# Patient Record
Sex: FEMALE | Race: WHITE | NOT HISPANIC OR LATINO | Employment: OTHER | URBAN - METROPOLITAN AREA
[De-identification: names, ages, dates, MRNs, and addresses within clinical notes are randomized per-mention and may not be internally consistent; named-entity substitution may affect disease eponyms.]

---

## 2017-01-30 PROBLEM — G45.9 TIA (TRANSIENT ISCHEMIC ATTACK): Status: ACTIVE | Noted: 2017-01-30

## 2017-01-30 PROBLEM — Z23 NEED FOR STREPTOCOCCUS PNEUMONIAE VACCINATION: Status: ACTIVE | Noted: 2017-01-30

## 2018-04-02 PROBLEM — Z57.1 OCCUPATIONAL EXPOSURE TO RADIATION: Chronic | Status: ACTIVE | Noted: 2018-04-02

## 2018-04-02 PROBLEM — G45.9 TIA (TRANSIENT ISCHEMIC ATTACK): Chronic | Status: ACTIVE | Noted: 2017-01-30

## 2018-04-02 PROBLEM — Z57.1 OCCUPATIONAL EXPOSURE TO RADIATION: Status: ACTIVE | Noted: 2018-04-02

## 2018-04-02 PROBLEM — Z23 NEED FOR STREPTOCOCCUS PNEUMONIAE VACCINATION: Chronic | Status: ACTIVE | Noted: 2017-01-30

## 2018-11-09 PROBLEM — R40.0 DAYTIME SOMNOLENCE: Status: ACTIVE | Noted: 2018-11-09

## 2019-03-18 PROBLEM — Z91.81 RISK FOR FALLS: Status: ACTIVE | Noted: 2019-03-18

## 2019-08-08 ENCOUNTER — HOSPITAL ENCOUNTER (OUTPATIENT)
Dept: RADIOLOGY | Facility: MEDICAL CENTER | Age: 84
End: 2019-08-08

## 2019-08-08 ENCOUNTER — HOSPITAL ENCOUNTER (INPATIENT)
Facility: MEDICAL CENTER | Age: 84
LOS: 20 days | DRG: 023 | End: 2019-08-28
Attending: EMERGENCY MEDICINE | Admitting: INTERNAL MEDICINE
Payer: MEDICARE

## 2019-08-08 ENCOUNTER — APPOINTMENT (OUTPATIENT)
Dept: RADIOLOGY | Facility: MEDICAL CENTER | Age: 84
DRG: 023 | End: 2019-08-08
Attending: EMERGENCY MEDICINE
Payer: MEDICARE

## 2019-08-08 ENCOUNTER — HOSPITAL ENCOUNTER (OUTPATIENT)
Facility: MEDICAL CENTER | Age: 84
End: 2019-08-08
Payer: MEDICARE

## 2019-08-08 ENCOUNTER — APPOINTMENT (OUTPATIENT)
Dept: RADIOLOGY | Facility: MEDICAL CENTER | Age: 84
DRG: 023 | End: 2019-08-08
Attending: RADIOLOGY
Payer: MEDICARE

## 2019-08-08 DIAGNOSIS — I63.9 ACUTE CVA (CEREBROVASCULAR ACCIDENT) (HCC): ICD-10-CM

## 2019-08-08 DIAGNOSIS — I63.311 CEREBROVASCULAR ACCIDENT (CVA) DUE TO THROMBOSIS OF RIGHT MIDDLE CEREBRAL ARTERY (HCC): ICD-10-CM

## 2019-08-08 PROBLEM — D72.829 LEUKOCYTOSIS: Status: ACTIVE | Noted: 2019-08-08

## 2019-08-08 PROBLEM — Z66 DNR (DO NOT RESUSCITATE): Status: ACTIVE | Noted: 2019-08-08

## 2019-08-08 PROBLEM — E87.1 HYPONATREMIA: Status: ACTIVE | Noted: 2019-08-08

## 2019-08-08 LAB
ALBUMIN SERPL BCP-MCNC: 3.9 G/DL (ref 3.2–4.9)
ALBUMIN/GLOB SERPL: 1.1 G/DL
ALP SERPL-CCNC: 68 U/L (ref 30–99)
ALT SERPL-CCNC: 16 U/L (ref 2–50)
ANION GAP SERPL CALC-SCNC: 12 MMOL/L (ref 0–11.9)
AST SERPL-CCNC: 27 U/L (ref 12–45)
BASOPHILS # BLD AUTO: 0.1 % (ref 0–1.8)
BASOPHILS # BLD: 0.01 K/UL (ref 0–0.12)
BILIRUB SERPL-MCNC: 0.6 MG/DL (ref 0.1–1.5)
BUN SERPL-MCNC: 12 MG/DL (ref 8–22)
CALCIUM SERPL-MCNC: 8.8 MG/DL (ref 8.5–10.5)
CHLORIDE SERPL-SCNC: 99 MMOL/L (ref 96–112)
CO2 SERPL-SCNC: 22 MMOL/L (ref 20–33)
CREAT SERPL-MCNC: 0.83 MG/DL (ref 0.5–1.4)
EKG IMPRESSION: NORMAL
EOSINOPHIL # BLD AUTO: 0 K/UL (ref 0–0.51)
EOSINOPHIL NFR BLD: 0 % (ref 0–6.9)
ERYTHROCYTE [DISTWIDTH] IN BLOOD BY AUTOMATED COUNT: 42.2 FL (ref 35.9–50)
GLOBULIN SER CALC-MCNC: 3.7 G/DL (ref 1.9–3.5)
GLUCOSE SERPL-MCNC: 112 MG/DL (ref 65–99)
HCT VFR BLD AUTO: 41 % (ref 37–47)
HGB BLD-MCNC: 13.7 G/DL (ref 12–16)
IMM GRANULOCYTES # BLD AUTO: 0.05 K/UL (ref 0–0.11)
IMM GRANULOCYTES NFR BLD AUTO: 0.4 % (ref 0–0.9)
INR PPP: 1.11 (ref 0.87–1.13)
LYMPHOCYTES # BLD AUTO: 1.15 K/UL (ref 1–4.8)
LYMPHOCYTES NFR BLD: 10.1 % (ref 22–41)
MCH RBC QN AUTO: 31.4 PG (ref 27–33)
MCHC RBC AUTO-ENTMCNC: 33.4 G/DL (ref 33.6–35)
MCV RBC AUTO: 93.8 FL (ref 81.4–97.8)
MONOCYTES # BLD AUTO: 0.52 K/UL (ref 0–0.85)
MONOCYTES NFR BLD AUTO: 4.6 % (ref 0–13.4)
NEUTROPHILS # BLD AUTO: 9.69 K/UL (ref 2–7.15)
NEUTROPHILS NFR BLD: 84.8 % (ref 44–72)
NRBC # BLD AUTO: 0 K/UL
NRBC BLD-RTO: 0 /100 WBC
PLATELET # BLD AUTO: 182 K/UL (ref 164–446)
PMV BLD AUTO: 9.5 FL (ref 9–12.9)
POTASSIUM SERPL-SCNC: 4.4 MMOL/L (ref 3.6–5.5)
PROT SERPL-MCNC: 7.6 G/DL (ref 6–8.2)
PROTHROMBIN TIME: 14.6 SEC (ref 12–14.6)
RBC # BLD AUTO: 4.37 M/UL (ref 4.2–5.4)
SODIUM SERPL-SCNC: 133 MMOL/L (ref 135–145)
TROPONIN T SERPL-MCNC: 25 NG/L (ref 6–19)
WBC # BLD AUTO: 11.4 K/UL (ref 4.8–10.8)

## 2019-08-08 PROCEDURE — 80053 COMPREHEN METABOLIC PANEL: CPT | Mod: 91

## 2019-08-08 PROCEDURE — 93005 ELECTROCARDIOGRAM TRACING: CPT | Performed by: EMERGENCY MEDICINE

## 2019-08-08 PROCEDURE — 700111 HCHG RX REV CODE 636 W/ 250 OVERRIDE (IP)

## 2019-08-08 PROCEDURE — 99291 CRITICAL CARE FIRST HOUR: CPT

## 2019-08-08 PROCEDURE — 99291 CRITICAL CARE FIRST HOUR: CPT | Performed by: INTERNAL MEDICINE

## 2019-08-08 PROCEDURE — 70496 CT ANGIOGRAPHY HEAD: CPT

## 2019-08-08 PROCEDURE — 700105 HCHG RX REV CODE 258: Performed by: INTERNAL MEDICINE

## 2019-08-08 PROCEDURE — 71045 X-RAY EXAM CHEST 1 VIEW: CPT

## 2019-08-08 PROCEDURE — 93005 ELECTROCARDIOGRAM TRACING: CPT

## 2019-08-08 PROCEDURE — 700117 HCHG RX CONTRAST REV CODE 255: Performed by: EMERGENCY MEDICINE

## 2019-08-08 PROCEDURE — 85610 PROTHROMBIN TIME: CPT

## 2019-08-08 PROCEDURE — 99153 MOD SED SAME PHYS/QHP EA: CPT

## 2019-08-08 PROCEDURE — 36415 COLL VENOUS BLD VENIPUNCTURE: CPT

## 2019-08-08 PROCEDURE — 85025 COMPLETE CBC W/AUTO DIFF WBC: CPT | Mod: 91

## 2019-08-08 PROCEDURE — 84484 ASSAY OF TROPONIN QUANT: CPT | Mod: 91

## 2019-08-08 PROCEDURE — 700111 HCHG RX REV CODE 636 W/ 250 OVERRIDE (IP): Performed by: RADIOLOGY

## 2019-08-08 PROCEDURE — 0042T CT-CEREBRAL PERFUSION ANALYSIS: CPT

## 2019-08-08 PROCEDURE — B31R1ZZ FLUOROSCOPY OF INTRACRANIAL ARTERIES USING LOW OSMOLAR CONTRAST: ICD-10-PCS | Performed by: RADIOLOGY

## 2019-08-08 PROCEDURE — 03CG3Z7 EXTIRPATION OF MATTER FROM INTRACRANIAL ARTERY USING STENT RETRIEVER, PERCUTANEOUS APPROACH: ICD-10-PCS | Performed by: RADIOLOGY

## 2019-08-08 PROCEDURE — 770022 HCHG ROOM/CARE - ICU (200)

## 2019-08-08 PROCEDURE — 700117 HCHG RX CONTRAST REV CODE 255: Performed by: RADIOLOGY

## 2019-08-08 PROCEDURE — 99222 1ST HOSP IP/OBS MODERATE 55: CPT

## 2019-08-08 PROCEDURE — 70498 CT ANGIOGRAPHY NECK: CPT

## 2019-08-08 RX ORDER — SODIUM CHLORIDE 9 MG/ML
INJECTION, SOLUTION INTRAVENOUS CONTINUOUS
Status: DISCONTINUED | OUTPATIENT
Start: 2019-08-08 | End: 2019-08-14

## 2019-08-08 RX ORDER — AMOXICILLIN 250 MG
2 CAPSULE ORAL 2 TIMES DAILY
Status: DISCONTINUED | OUTPATIENT
Start: 2019-08-08 | End: 2019-08-09

## 2019-08-08 RX ORDER — ASPIRIN 300 MG/1
300 SUPPOSITORY RECTAL DAILY
Status: DISCONTINUED | OUTPATIENT
Start: 2019-08-09 | End: 2019-08-09

## 2019-08-08 RX ORDER — ONDANSETRON 2 MG/ML
4 INJECTION INTRAMUSCULAR; INTRAVENOUS EVERY 4 HOURS PRN
Status: DISCONTINUED | OUTPATIENT
Start: 2019-08-08 | End: 2019-08-28 | Stop reason: HOSPADM

## 2019-08-08 RX ORDER — CITALOPRAM 20 MG/1
20 TABLET ORAL DAILY
Status: ON HOLD | COMMUNITY
End: 2019-08-28

## 2019-08-08 RX ORDER — PANTOPRAZOLE SODIUM 40 MG/1
40 TABLET, DELAYED RELEASE ORAL
COMMUNITY

## 2019-08-08 RX ORDER — MIDAZOLAM HYDROCHLORIDE 1 MG/ML
.5-2 INJECTION INTRAMUSCULAR; INTRAVENOUS PRN
Status: ACTIVE | OUTPATIENT
Start: 2019-08-08 | End: 2019-08-08

## 2019-08-08 RX ORDER — LEVOTHYROXINE SODIUM 0.03 MG/1
25 TABLET ORAL
Status: DISCONTINUED | OUTPATIENT
Start: 2019-08-09 | End: 2019-08-09

## 2019-08-08 RX ORDER — POLYETHYLENE GLYCOL 3350 17 G/17G
1 POWDER, FOR SOLUTION ORAL
Status: DISCONTINUED | OUTPATIENT
Start: 2019-08-08 | End: 2019-08-09

## 2019-08-08 RX ORDER — ENALAPRILAT 1.25 MG/ML
1.25 INJECTION INTRAVENOUS EVERY 6 HOURS PRN
Status: DISCONTINUED | OUTPATIENT
Start: 2019-08-08 | End: 2019-08-28 | Stop reason: HOSPADM

## 2019-08-08 RX ORDER — ONDANSETRON 2 MG/ML
4 INJECTION INTRAMUSCULAR; INTRAVENOUS PRN
Status: ACTIVE | OUTPATIENT
Start: 2019-08-08 | End: 2019-08-08

## 2019-08-08 RX ORDER — SODIUM CHLORIDE 9 MG/ML
500 INJECTION, SOLUTION INTRAVENOUS
Status: ACTIVE | OUTPATIENT
Start: 2019-08-08 | End: 2019-08-08

## 2019-08-08 RX ORDER — ACETAMINOPHEN 325 MG/1
650 TABLET ORAL EVERY 6 HOURS PRN
Status: DISCONTINUED | OUTPATIENT
Start: 2019-08-08 | End: 2019-08-09

## 2019-08-08 RX ORDER — ASPIRIN 81 MG/1
324 TABLET, CHEWABLE ORAL DAILY
Status: DISCONTINUED | OUTPATIENT
Start: 2019-08-09 | End: 2019-08-09

## 2019-08-08 RX ORDER — MIDAZOLAM HYDROCHLORIDE 1 MG/ML
INJECTION INTRAMUSCULAR; INTRAVENOUS
Status: COMPLETED
Start: 2019-08-08 | End: 2019-08-08

## 2019-08-08 RX ORDER — ASPIRIN 325 MG
325 TABLET ORAL DAILY
Status: DISCONTINUED | OUTPATIENT
Start: 2019-08-09 | End: 2019-08-09

## 2019-08-08 RX ORDER — ATORVASTATIN CALCIUM 20 MG/1
20 TABLET, FILM COATED ORAL NIGHTLY
Status: DISCONTINUED | OUTPATIENT
Start: 2019-08-08 | End: 2019-08-09

## 2019-08-08 RX ORDER — DONEPEZIL HYDROCHLORIDE 5 MG/1
10 TABLET, FILM COATED ORAL NIGHTLY
Status: DISCONTINUED | OUTPATIENT
Start: 2019-08-08 | End: 2019-08-09

## 2019-08-08 RX ORDER — ONDANSETRON 4 MG/1
4 TABLET, ORALLY DISINTEGRATING ORAL EVERY 4 HOURS PRN
Status: DISCONTINUED | OUTPATIENT
Start: 2019-08-08 | End: 2019-08-09

## 2019-08-08 RX ORDER — BISACODYL 10 MG
10 SUPPOSITORY, RECTAL RECTAL
Status: DISCONTINUED | OUTPATIENT
Start: 2019-08-08 | End: 2019-08-09

## 2019-08-08 RX ORDER — ATORVASTATIN CALCIUM 10 MG/1
10 TABLET, FILM COATED ORAL NIGHTLY
Status: ON HOLD | COMMUNITY
End: 2019-08-28

## 2019-08-08 RX ORDER — SOLIFENACIN SUCCINATE 5 MG/1
10 TABLET, FILM COATED ORAL DAILY
Status: ON HOLD | COMMUNITY
End: 2019-08-28

## 2019-08-08 RX ADMIN — FENTANYL CITRATE 25 MCG: 50 INJECTION, SOLUTION INTRAMUSCULAR; INTRAVENOUS at 17:29

## 2019-08-08 RX ADMIN — FENTANYL CITRATE 25 MCG: 50 INJECTION, SOLUTION INTRAMUSCULAR; INTRAVENOUS at 17:39

## 2019-08-08 RX ADMIN — IOHEXOL 100 ML: 350 INJECTION, SOLUTION INTRAVENOUS at 15:32

## 2019-08-08 RX ADMIN — MIDAZOLAM 0.5 MG: 1 INJECTION INTRAMUSCULAR; INTRAVENOUS at 17:02

## 2019-08-08 RX ADMIN — SODIUM CHLORIDE: 9 INJECTION, SOLUTION INTRAVENOUS at 22:15

## 2019-08-08 RX ADMIN — MIDAZOLAM 0.5 MG: 1 INJECTION INTRAMUSCULAR; INTRAVENOUS at 16:47

## 2019-08-08 RX ADMIN — MIDAZOLAM 0.5 MG: 1 INJECTION INTRAMUSCULAR; INTRAVENOUS at 16:28

## 2019-08-08 RX ADMIN — IOHEXOL 35 ML: 300 INJECTION, SOLUTION INTRAVENOUS at 17:20

## 2019-08-08 RX ADMIN — MIDAZOLAM 0.5 MG: 1 INJECTION INTRAMUSCULAR; INTRAVENOUS at 17:13

## 2019-08-08 ASSESSMENT — ENCOUNTER SYMPTOMS
NAUSEA: 0
DIARRHEA: 0
DIZZINESS: 1
ABDOMINAL PAIN: 0
BLURRED VISION: 0
SHORTNESS OF BREATH: 0
SPUTUM PRODUCTION: 0
SPEECH CHANGE: 1
HEARTBURN: 0
DEPRESSION: 0
FALLS: 0
PALPITATIONS: 0
PND: 0
CHILLS: 0
COUGH: 0
SEIZURES: 0
CONSTIPATION: 0
HEADACHES: 1
TREMORS: 0
NECK PAIN: 0
WHEEZING: 0
WEAKNESS: 1
VOMITING: 0
BACK PAIN: 0
FOCAL WEAKNESS: 1
WEIGHT LOSS: 0
FEVER: 0
EYE PAIN: 0

## 2019-08-08 ASSESSMENT — LIFESTYLE VARIABLES
DO YOU DRINK ALCOHOL: NO
CONSUMPTION TOTAL: INCOMPLETE
HOW MANY TIMES IN THE PAST YEAR HAVE YOU HAD 5 OR MORE DRINKS IN A DAY: 0
AVERAGE NUMBER OF DAYS PER WEEK YOU HAVE A DRINK CONTAINING ALCOHOL: 0
SUBSTANCE_ABUSE: 0
ON A TYPICAL DAY WHEN YOU DRINK ALCOHOL HOW MANY DRINKS DO YOU HAVE: 0

## 2019-08-08 NOTE — ED PROVIDER NOTES
ED Provider Note    ED Provider Note    Primary care provider: NARCISO Rudolph  Means of arrival: EMS care flight transfer  History obtained from: Medical record    CHIEF COMPLAINT  Chief Complaint   Patient presents with   • Possible Stroke     Last known well 1030PM on 8/7/19     Seen at 3:00 PM.   HPI  Dominique King is a 88 y.o. female who presents to the Emergency Department with a devastating CVA.  The patient was evaluated at [facility] yesterday evening after a fall.  Head CT was unremarkable at the time and apparently the patient was neurologically intact.  There was some concern of a possible right sided gaze preference per the son that evening.  When he came to check on his mother this morning at around 1030 she had significant neurological deficits and she was sent back to the emergency department.  Repeat noncontrast head CT was unremarkable.  The exact onset of the patient's symptoms is unknown but last seen normal at approximately 10 PM yesterday.  She was therefore transferred here for possible IR thrombectomy.    The patient has some significant dysarthria and therefore the history is limited at this time.  She denies any pain.    REVIEW OF SYSTEMS  See HPI,   Remainder of ROS negative/limited due to clinical condition.     PAST MEDICAL HISTORY   has a past medical history of Advanced directives, counseling/discussion (2012), Allergic rhinitis, ASTHMA, CAD (coronary artery disease), Carpal tunnel syndrome, Cataracts, bilateral, Cervical spine pain, Chest wall contusion (5/31/2013), Chronic low back pain, Degenerative arthritis of spine, Dementia, Depression with anxiety, Diarrhea, Diverticulitis, Dizziness (3/2012), Dry eye syndrome, Dry mouth, Dyslipidemia (2/9/2012), Falls frequently, Foot fracture, right (2011), Fracture of metacarpal of right hand, closed (5/31/2013), Gallstones, GERD (gastroesophageal reflux disease), H. pylori infection, H/O colonoscopy (2005, 2008), Internal  "hemorrhoids, Laceration of head (5/31/2013), Microscopic colitis, Migraine, OSTEOPOROSIS, overactive bladder, PUD (peptic ulcer disease), Radiculopathy, Renal insufficiency (11/26/2012), Rotator cuff tear, Scoliosis, Spinal stenosis, Spondylolisthesis, Subdural hematoma (HCC) (5/30/2012), Thyroid nodule, URINARY INCONTINENCE, and Vitamin d deficiency (11/26/2012).    SURGICAL HISTORY   has a past surgical history that includes knee replacement, total (1993); arthroscopy, knee; rotator cuff repair; carpal tunnel release; trigger finger release; other orthopedic surgery; bladder sling female; hiatal hernia repair (1993); hysterectomy, total abdominal (1998); cholecystectomy; and cataract extraction with iol.    SOCIAL HISTORY  Social History     Tobacco Use   • Smoking status: Never Smoker   • Smokeless tobacco: Never Used   Substance Use Topics   • Alcohol use: Yes     Comment: occasional wine   • Drug use: No      Social History     Substance and Sexual Activity   Drug Use No       FAMILY HISTORY  Family History   Problem Relation Age of Onset   • GI Disease Father         \"stomach issues\"   • Diabetes Father    • GI Disease Son         Crohn's       CURRENT MEDICATIONS  Reviewed.  See Encounter Summary.     ALLERGIES  Allergies   Allergen Reactions   • Donepezil    • Pcn [Penicillins]      Hives  Per patient has taken Keflex without any reaction in the past.       PHYSICAL EXAM  VITAL SIGNS: BP (!) 163/74   Pulse 73   Temp 36.7 °C (98 °F) (Temporal)   Resp 16   Ht 1.524 m (5')   Wt 54.4 kg (120 lb)   SpO2 96%   Breastfeeding? No   BMI 23.44 kg/m²   Constitutional: Awake, alert, follows commands but is slow to answer questions, ill-appearing.  HENT: Normocephalic, ecchymosis on the left side of the face.  Bilateral external ears normal. Nose normal.   Eyes: Conjunctiva normal, non-icteric, EOMI.    Thorax & Lungs: Easy unlabored respirations, Clear to ascultation bilaterally.  Cardiovascular: Regular rate, " Regular rhythm, 3-6 systolic murmur   abdomen:  Soft, nontender, nondistended, normal active bowel sounds.   :    Skin: Visualized skin is  Dry, No erythema, No rash.   Musculoskeletal:   No cyanosis, clubbing or edema.  Ecchymosis throughout the left upper and left lower extremity.  Neurologic: Alert, moderate dysarthria, patient is able to state her name and is aware of surroundings.  Some left-sided facial droop, no tongue deviation, extraocular movements are intact, pupils are equal and reactive.  The patient has no movement of the left upper and left lower extremity.  Good  strength, range of motion of the right upper extremity.  Good strength of the right lower extremity without drift.  Sensation decreased in the left upper and left lower extremity.  Psychiatric: Difficult to assess, slightly lethargic  Lymphatic:  No cervical LAD    EKG   12 lead Interpreted by me  Rhythm:  Normal sinus rhythm   Rate: 72  Axis: normal  Ectopy: none  Conduction: normal  ST Segments: no acute change  T Waves: no acute change  Clinical Impression: Normal EKG without acute changes     RADIOLOGY  OUTSIDE IMAGES-CT HEAD   Final Result      OUTSIDE IMAGES-CT HEAD   Final Result      CT-CTA NECK WITH & W/O-POST PROCESSING   Final Result      Moderate calcified plaque right carotid artery bifurcation. No significant stenosis.   Left carotid arteries appear patent.   Vertebral arteries appear patent.   Focal enlargement of right lingual tonsils measuring 12.6 mm in diameter. Patient's condition permits, would consider further evaluation.      CT-CEREBRAL PERFUSION ANALYSIS   Final Result      1.  Cerebral blood flow less than 30% likely representing completed infarct = 38 mL.      2.  T Max more than 6 seconds likely representing combination of completed infarct and ischemia = 98 mL.      3.  Mismatched volume likely representing ischemic brain/penumbra = 60 mL      4.  Please note that the cerebral perfusion was performed on the  limited brain tissue around the basal ganglia region. Infarct/ischemia outside the CT perfusion sections can be missed in this study.      DX-CHEST-PORTABLE (1 VIEW)    (Results Pending)   CT-CTA HEAD WITH & W/O-POST PROCESS    (Results Pending)   IR-THROMBO MECHANICAL ARTERY,INIT    (Results Pending)   IR-THROMBECTOMY ARTERY SECONDARY    (Results Pending)         COURSE & MEDICAL DECISION MAKING  Pertinent Labs & Imaging studies reviewed. (See chart for details)    Differential diagnoses include but are not limited to: CVA    3:00 PM - Medical record reviewed, patient seen and examined at bedside.    3:55 PM -Case discussed with Dr. Munoz, interventional radiologist who will take the patient for thrombectomy.     4:05 PM-Case discussed with Dr. Merino, neurology who will evaluate the patient.      4:17 PM Dr. Mendiola, hospitalist will write admit orders.    4:25 PM -Case discussed with Dr. Valle, critical care will evaluate the patient.    Decision Making:  This is an unfortunate 88 y.o. year old female who presents with a devastating MCA occlusion.  She is well outside the window for alteplase.  She was sent here for possible IR thrombectomy.  The patient is hemodynamically stable, she does not need endotracheal intubation at this time as she is able to protect her airway.  She was sent directly over to the CT scan upon arrival for CTA which shows a large vessel occlusion in the right MCA bifurcation.  I discussed the case with radiology, interventional radiology, the hospitalist, neurology, the intensivist.    The patient will be directly admitted to the ICU following thrombectomy.  Hopefully she will gain some measure of recovery though her overall prognosis is very guarded at this point.    CRITICAL CARE  The very real possibilty of a deterioration of this patient's condition required the highest level of my preparedness for sudden, emergent intervention.  I provided critical care services, which included  medication orders, frequent reevaluations of the patient's condition and response to treatment, ordering and reviewing test results, and discussing the case with various consultants.  The critical care time associated with the care of the patient was 35 minutes. Review chart for interventions. This time is exclusive of any other billable procedures.           FINAL IMPRESSION  1. Acute CVA (cerebrovascular accident) (HCC)

## 2019-08-08 NOTE — ED NOTES
Med Rec Updated and Complete per Pt's Historical Information  Allergies Reviewed  UNK PO ABX HX.    Pt unable to participate in an interview at this time. Pt's family not reachable at this time.    Unknown last doses.

## 2019-08-08 NOTE — ED NOTES
Dr. Miramontes at bedside signing consent.     This RN performed NIH score. Patient to go to IR. Vitals stable upon transfer

## 2019-08-08 NOTE — PROGRESS NOTES
Procedure Confirmed with MD, RN, RT and patient pre procedure.  Cerebral Angiogram with Possible Intervention by MD Munoz assisted by RT Dheeraj, Right Femoral Artery access site.    End Tidal CO2 range 18-36 throughout procedure.    Right Femoral Artery access site, sealed with Angeoseal, covered with gauze/tegaderm, C/D/I.   AngioSeal, VIP Vascular Closure Device, 8F, REF# [id number], LOT# [id number], Exp. Date 11/12/2018.  2+ bilateral DP pulses pre procedure   2+ bilateral DP pulses post procedure    Patient tolerated procedure, hemodynamically stable; SPB elevated as high as 186, MD Munoz notified, no intervention ordered at that time.    Pt drowsy but following simple commands post-procedure, see flowsheet for neuro assessment for post-procedure assessment; report given to OMID Bryant.  [facility] ICU rm [facility] via IR RN monitored then transferred care to report RN.  Handoff NIH performed with ICU RN Annette, NIH score of 21 at that time, see flowsheets.

## 2019-08-08 NOTE — H&P
Hospital Medicine History & Physical Note    Date of Service  8/8/2019    Primary Care Physician  ANTONIO Rudolph.    Consultants  IR neurology  Neurology  Intensivist    Code Status  DNAR/I OK    Chief Complaint  Ground-level fall, altered mental status, right-sided gaze and left side neglect    History of Presenting Illness  88 y.o. female who presented 8/8/2019 with past medical history of dementia, CAD, frequent falls, presented with complains of ground-level fall, altered mental status, right-sided gaze and left side neglect.  Apparently patient had a fall at home and was brought in by family.  Patient was recently seen by other ER for fall and then was sent home.  After going home patient initially was doing well until later this morning patient was noticed by family to be on the floor and lethargic and has right-sided gaze and left-sided neglect.  Patient was sent to other hospital immediately.  In the hospital CT scan did not show intracranial bleed.  Patient was sent here for further evaluation.  Patient was also consulted by neurology and IR neurologist and recommend to receive IR thrombectomy.  Patient currently is very lethargic and confused.  Patient complains of general body weakness. Patient's pain is general 2-3/10, intermittent and does not radiate to other location, sharp and with some tingling. Can be controlled by pain meds.   Patient otherwise denies fever, chills, nausea, vomiting, adb pain, SOB, CP, headache, constipation, diarrhea, cough, or sputum.      Review of Systems  Review of Systems   Constitutional: Positive for malaise/fatigue. Negative for chills, fever and weight loss.   HENT: Negative for congestion, ear discharge, ear pain, hearing loss and nosebleeds.    Eyes: Negative for blurred vision and pain.   Respiratory: Negative for cough, sputum production, shortness of breath and wheezing.    Cardiovascular: Negative for chest pain, palpitations, leg swelling and PND.    Gastrointestinal: Negative for abdominal pain, constipation, diarrhea, heartburn, nausea and vomiting.   Genitourinary: Negative for dysuria, frequency and hematuria.   Musculoskeletal: Negative for back pain, falls, joint pain and neck pain.   Skin: Negative for rash.   Neurological: Positive for dizziness, speech change, focal weakness, weakness and headaches. Negative for tremors and seizures.   Psychiatric/Behavioral: Negative for depression, substance abuse and suicidal ideas.       Past Medical History   has a past medical history of Advanced directives, counseling/discussion (2012), Allergic rhinitis, ASTHMA, CAD (coronary artery disease), Carpal tunnel syndrome, Cataracts, bilateral, Cervical spine pain, Chest wall contusion (5/31/2013), Chronic low back pain, Degenerative arthritis of spine, Dementia, Depression with anxiety, Diarrhea, Diverticulitis, Dizziness (3/2012), Dry eye syndrome, Dry mouth, Dyslipidemia (2/9/2012), Falls frequently, Foot fracture, right (2011), Fracture of metacarpal of right hand, closed (5/31/2013), Gallstones, GERD (gastroesophageal reflux disease), H. pylori infection, H/O colonoscopy (2005, 2008), Internal hemorrhoids, Laceration of head (5/31/2013), Microscopic colitis, Migraine, OSTEOPOROSIS, overactive bladder, PUD (peptic ulcer disease), Radiculopathy, Renal insufficiency (11/26/2012), Rotator cuff tear, Scoliosis, Spinal stenosis, Spondylolisthesis, Subdural hematoma (HCC) (5/30/2012), Thyroid nodule, URINARY INCONTINENCE, and Vitamin d deficiency (11/26/2012).    Surgical History   has a past surgical history that includes knee replacement, total (1993); arthroscopy, knee; rotator cuff repair; carpal tunnel release; trigger finger release; other orthopedic surgery; bladder sling female; hiatal hernia repair (1993); hysterectomy, total abdominal (1998); cholecystectomy; and cataract extraction with iol.     Family History  family history includes Diabetes in her father;  GI Disease in her father and son.     Social History   reports that she has never smoked. She has never used smokeless tobacco. She reports that she drinks alcohol. She reports that she does not use drugs.    Allergies  Allergies   Allergen Reactions   • Donepezil    • Pcn [Penicillins]      Hives  Per patient has taken Keflex without any reaction in the past.       Medications  Prior to Admission Medications   Prescriptions Last Dose Informant Patient Reported? Taking?   Albuterol Sulfate 108 (90 Base) MCG/ACT AEROSOL POWDER, BREATH ACTIVATED UNK at PRN Historical No No   Sig: Inhale 2 Puffs by mouth every 6 hours as needed.   atorvastatin (LIPITOR) 10 MG Tab UNK at UNK Historical Yes Yes   Sig: Take 10 mg by mouth every evening.   citalopram (CELEXA) 20 MG Tab UNK at UNK Historical Yes Yes   Sig: Take 20 mg by mouth every day.   coenzyme Q-10 30 MG capsule UNK at UNK Historical Yes No   Sig: Take 60 mg by mouth every day.   denosumab (PROLIA) 60 MG/ML Solution UNK at UNK Historical Yes No   Sig: Inject 60 mg as instructed every 6 months.   donepezil (ARICEPT) 10 MG tablet UNK at UNK Historical Yes No   Sig: Take 10 mg by mouth every evening.   estradiol (ESTRACE VAGINAL) 0.1 MG/GM vaginal cream UNK at UNK Historical No No   Sig: Insert 1 g in vagina every day. Apply three times a week   levothyroxine (SYNTHROID) 25 MCG Tab UNK at UNK Historical Yes No   Sig: Take 25 mcg by mouth Every morning on an empty stomach.   lidocaine (LIDODERM) 5 % Patch UNK at UNK Historical Yes No   Sig: Apply 1 Patch to skin as directed every 24 hours.   meclizine (ANTIVERT) 25 MG Tab UNK at PRN Historical Yes No   Sig: Take 25 mg by mouth 3 times a day as needed for Dizziness, Nausea/Vomiting or Vertigo.   modafinil (PROVIGIL) 100 MG Tab UNK at UNK Historical Yes No   Sig: Take 100 mg by mouth every day.   pantoprazole (PROTONIX) 40 MG Tablet Delayed Response UNK at UNK Historical Yes Yes   Sig: Take 40 mg by mouth every day.    solifenacin (VESICARE) 5 MG tablet UNK at UNK Historical Yes Yes   Sig: Take 10 mg by mouth every day.      Facility-Administered Medications: None       Physical Exam  Temp:  [36.7 °C (98 °F)-37.2 °C (98.9 °F)] 36.7 °C (98 °F)  Pulse:  [66-94] 84  Resp:  [13-22] 16  BP: (131-178)/() 170/92  SpO2:  [95 %-100 %] 96 %    Physical Exam   Constitutional: She appears well-developed and well-nourished.   General ill-appearing   HENT:   Head: Normocephalic.   Nose: Nose normal.   Mouth/Throat: No oropharyngeal exudate.   Eyes: Pupils are equal, round, and reactive to light. EOM are normal.   Neck: Normal range of motion. Neck supple. No JVD present. No thyromegaly present.   Cardiovascular: Normal rate, regular rhythm and normal heart sounds. Exam reveals no gallop and no friction rub.   No murmur heard.  Pulmonary/Chest: Effort normal and breath sounds normal. No respiratory distress. She has no wheezes. She has no rales.   Abdominal: Soft. Bowel sounds are normal. She exhibits no distension and no mass. There is no tenderness. There is no rebound and no guarding.   Musculoskeletal: Normal range of motion. She exhibits no edema or tenderness.   Lymphadenopathy:     She has no cervical adenopathy.   Neurological: She is alert. No cranial nerve deficit.   Significant left-sided weakness, confused  Some facial droop  Left side upper and lower extremity muscle strengths 0-1 out of 5  Right side muscle strength within normal limits   Skin: Skin is warm. No rash noted.   Psychiatric: Her behavior is normal.       Laboratory:  Recent Labs     08/07/19  1143 08/08/19  1300 08/08/19  1510   WBC 11.1* 12.9* 11.4*   RBC 4.32 4.46 4.37   HEMOGLOBIN 13.4 13.8 13.7   HEMATOCRIT 40.2 41.1 41.0   MCV 93.1 92.2 93.8   MCH 31.0 30.9 31.4   MCHC 33.3 33.6 33.4*   RDW 12.2 12.2 42.2   PLATELETCT 199 202 182   MPV 9.3 9.4 9.5     Recent Labs     08/07/19  1143 08/08/19  1300 08/08/19  1511   SODIUM 135* 135* 133*   POTASSIUM 4.2 4.4  4.4   CHLORIDE 100 98 99   CO2 26 21 22   GLUCOSE 102* 122* 112*   BUN 11 13 12   CREATININE 1.0 1.0 0.83   CALCIUM 8.7 8.6 8.8     Recent Labs     08/07/19  1143 08/08/19  1300 08/08/19  1511   ALTSGPT 18 19 16   ASTSGOT 23 28 27   ALKPHOSPHAT 83 82 68   TBILIRUBIN 0.5 0.7 0.6   LIPASE 209  --   --    GLUCOSE 102* 122* 112*     Recent Labs     08/07/19  1143 08/08/19  1511   INR 1.08 1.11     No results for input(s): NTPROBNP in the last 72 hours.      Recent Labs     08/08/19  1511   TROPONINT 25*       Urinalysis:    Recent Labs     08/08/19  1300   SPECGRAVITY 1.025   GLUCOSEUR NEGATIVE   KETONES >=80*   NITRITE POSITIVE*   LEUKESTERAS NEGATIVE   WBCURINE 6-10*   RBCURINE 0-2   BACTERIA 3+*   EPITHELCELL None Seen        Imaging:  DX-CHEST-PORTABLE (1 VIEW)   Final Result      1.  No acute cardiopulmonary disease.   2.  Contrast present within mildly dilated LEFT renal collecting system.      IR-THROMBO MECHANICAL ARTERY,INIT   Final Result      1.  Occlusion of the right middle cerebral artery, M2 segment.      2.  Successful cerebral thrombectomy performed with post intervention angiogram demonstrating patent right middle cerebral artery vessels.      Findings were discussed with Dr. Miramontes at approximately 1755 hrs.      OUTSIDE IMAGES-CT HEAD   Final Result      OUTSIDE IMAGES-CT HEAD   Final Result      CT-CTA NECK WITH & W/O-POST PROCESSING   Final Result      Moderate calcified plaque right carotid artery bifurcation. No significant stenosis.   Left carotid arteries appear patent.   Vertebral arteries appear patent.   Focal enlargement of right lingual tonsils measuring 12.6 mm in diameter. Patient's condition permits, would consider further evaluation.      CT-CEREBRAL PERFUSION ANALYSIS   Final Result      1.  Cerebral blood flow less than 30% likely representing completed infarct = 38 mL.      2.  T Max more than 6 seconds likely representing combination of completed infarct and ischemia = 98 mL.       3.  Mismatched volume likely representing ischemic brain/penumbra = 60 mL      4.  Please note that the cerebral perfusion was performed on the limited brain tissue around the basal ganglia region. Infarct/ischemia outside the CT perfusion sections can be missed in this study.      CT-CTA HEAD WITH & W/O-POST PROCESS    (Results Pending)   EC-ECHOCARDIOGRAM COMPLETE W/ CONT    (Results Pending)         Assessment/Plan:  I anticipate this patient will require at least two midnights for appropriate medical management, necessitating inpatient admission.    * CVA (cerebral vascular accident) (HCC)- (present on admission)  Assessment & Plan  Patient came in with significant symptoms of CVA.  Not a candidate for TPA but a good candidate for thrombectomy.  Patient underwent IR thrombectomy with success.  Patient need to be admitted to ICU for post thrombectomy protocol  Neuro check q. one hour  Close monitor patient's vitals  PRN Vasotec for systolic blood pressure greater than 165  PT OT rehab swallow evaluation ordered by myself  I also start patient on Lipitor  Aspirin cannot be started until 24 hours post thrombectomy  SCD for DVT prophylaxis  I also ordered IV fluid rehydration    DNR (do not resuscitate)- (present on admission)  Assessment & Plan  Total time spent on advanced care planning, excluding time spent on daily care: 16 minutes.    1st 30 minutes [id number]     I discussed extensively with patient and patient's family is regarding code status and plan of care. We also discussed advanced care planning including diagnosis, prognosis, plan of care, risks and benefits of any therapies that could be offered, as well as alternatives including palliation and hospice, as appropriate. My discussion is summarized above in detail. Confirmed with DNAR/MARILYN POLO      Hyponatremia- (present on admission)  Assessment & Plan  Na 133  Mild, likely due to volume depletion  Gentle IV fluid rehydration and close monitor  sodium    Leukocytosis  Assessment & Plan  Mild leukocytosis  No signs of infection likely due to stress induced demargination  Hold off antibiotics and close monitor recheck CBC tomorrow    CAD (coronary artery disease)- (present on admission)  Assessment & Plan  Currently no chest pain  Continue monitor patient symptoms  Aspirin will be started 24 hours post thrombectomy  Continue Lipitor  I ordered to check lipid panel and A1c  I ordered echocardiogram with contrast      Falls frequently- (present on admission)  Assessment & Plan  Due to advanced age versus history of CVA  Close monitor  PT OT    Dementia- (present on admission)  Assessment & Plan  History of  Continue home medication Aricept  Close monitor mental status    CRITICAL CARE    Patient is critically ill.   The patient continues to have: CVA, focal neurological deficit, confusion and altered mental status  The vital organ system that is affected is the: Brain  If untreated there is a high chance of deterioration into:,  And eventually death.   The critical care that I am providing today is: Admit patient to ICU for post thrombectomy treatment, IV fluid rehydration, coordinate neurology ERP and IR treatment.  The critical that has been undertaken is medically complex.   There has been no overlap in critical care time.   Critical Care Time not including procedures: 41min    The very real possibilty of a deterioration of this patient's condition required the highest level of my preparedness for sudden, emergent intervention.  I provided critical care services, which included medication orders, frequent reevaluations of the patient's condition and response to treatment, ordering and reviewing test results, and discussing the case with various consultants.  Review chart for interventions. This time is exclusive of any other billable procedures.     I have discussed with RN and other consultants about patient's plan.         I spent a total of 34 minutes of  non face to face time performing additional research, reviewing old medical records, provided on going management by following up on labs, placing orders, discussing plan of care with other healthcare providers and nursing staff. Start time: 5:11pm. End time: 5:45pm    Billing code [id number]    VTE prophylaxis: SCD      Current Facility-Administered Medications:   •  [START ON 8/9/2019] levothyroxine (SYNTHROID) tablet 25 mcg, 25 mcg, Oral, AM ES, Glory Mendiola M.D.  •  donepezil (ARICEPT) tablet 10 mg, 10 mg, Oral, Nightly, Glory Mendiola M.D.  •  atorvastatin (LIPITOR) tablet 20 mg, 20 mg, Oral, Nightly, Glory Mendiola M.D.  •  acetaminophen (TYLENOL) tablet 650 mg, 650 mg, Oral, Q6HRS PRN, Glory Mendiola M.D.  •  senna-docusate (PERICOLACE or SENOKOT S) 8.6-50 MG per tablet 2 Tab, 2 Tab, Oral, BID **AND** polyethylene glycol/lytes (MIRALAX) PACKET 1 Packet, 1 Packet, Oral, QDAY PRN **AND** magnesium hydroxide (MILK OF MAGNESIA) suspension 30 mL, 30 mL, Oral, QDAY PRN **AND** bisacodyl (DULCOLAX) suppository 10 mg, 10 mg, Rectal, QDAY PRN, Glory Mendiola M.D.  •  NS infusion, , Intravenous, Continuous, Glory Mendiola M.D.  •  enalaprilat (VASOTEC) injection 1.25 mg, 1.25 mg, Intravenous, Q6HRS PRN, Glory Mendiola M.D.  •  ondansetron (ZOFRAN) syringe/vial injection 4 mg, 4 mg, Intravenous, Q4HRS PRN, Glory Mendiola M.D.  •  ondansetron (ZOFRAN ODT) dispertab 4 mg, 4 mg, Oral, Q4HRS PRN, Glory Mendiola M.D.  •  [START ON 8/9/2019] aspirin (ASA) tablet 325 mg, 325 mg, Oral, DAILY **OR** [START ON 8/9/2019] aspirin (ASA) chewable tab 324 mg, 324 mg, Oral, DAILY **OR** [START ON 8/9/2019] aspirin (ASA) suppository 300 mg, 300 mg, Rectal, DAILY, Glory Mendiola M.D.

## 2019-08-08 NOTE — ED TRIAGE NOTES
Patient arrives on care flight from [facility]. Was evaluated yesterday for fall and sent home. Son saw patient at 1030 last night and she seemed normal. At 1030 this morning patient was found to be lethargic and on the floor by son. Patient with bruising to left side body (head, arm, leg, foot), right sided gaze and left sided neglect. Patient was reevaluated at [place] with negative CT scans and sent over here for further evaluation.

## 2019-08-08 NOTE — CONSULTS
Hospital Neurology Stroke Consult:    Referring Physician: Clark Miramontes MD     Reason for consultation: stroke alert     Last Known Well: last night around 10 pm   Time Called: today afternoon 4 pm  Patient > 12h since onset of symptoms     HPI:   89 yo female presents via EMS with left sided weakness and right gaze preference. She was seen in ED last night with generalized weakness and had NIHSS of zero. She was able to ambulate and was discharged home. She went to sleep around 10 pm with right gaze preference and woke up around 11 30 am this morning with inability to move left side of her body. Per EMS blood sugar was normal and she was able to answer questions with left hemibody weakness and numbness and right sided gaze preference.  She was found to have RMCA thrombus at bifurcation and was taken to IR for thrombectomy.  She had L SDH in 2012 with complete resolution of hemorrhage and no chronic deficits.  No known prior strokes and no known afib.    Checked on patient after thrombectomy and she is awake and speaking and minimally able to move left arm and left leg.Right gaze preference remains.      ROS: as above     As above. All other systems reviewed and are negative.    Past Medical History:   Past Medical History:   Diagnosis Date   • Advanced directives, counseling/discussion 2012    AND/DNR   • Allergic rhinitis    • ASTHMA    • CAD (coronary artery disease)    • Carpal tunnel syndrome     s/p bilateral surgical release   • Cataracts, bilateral     s/p surgery   • Cervical spine pain    • Chest wall contusion 5/31/2013   • Chronic low back pain    • Degenerative arthritis of spine    • Dementia     MMSE 17/30 (5/15/2012)   • Depression with anxiety    • Diarrhea    • Diverticulitis    • Dizziness 3/2012    hospitalization:  mastoiditis vs. vertigo vs. narcolepsy vs. syncope vs. seizure   • Dry eye syndrome    • Dry mouth    • Dyslipidemia 2/9/2012    , TG 64, HDL 49, nonHDL 205, , TC/HDL  "5.18 (2/9/2012)   • Falls frequently     ambulates with cane, power chair 5/2012   • Foot fracture, right 2011   • Fracture of metacarpal of right hand, closed 5/31/2013    4th and 5th metacarpal fx   • Gallstones     s/p cholecystectomy   • GERD (gastroesophageal reflux disease)     on PPI, followed by GI   • H. pylori infection    • H/O colonoscopy 2005, 2008    microscopic colitis, diverticulosis, hemorhoids (2005).  internal hemorrhoids (2008)   • Internal hemorrhoids    • Laceration of head 5/31/2013   • Microscopic colitis     on asacol, followed by GI   • Migraine     topamax   • OSTEOPOROSIS     followed by Dr. Lal   • overactive bladder    • PUD (peptic ulcer disease)    • Radiculopathy    • Renal insufficiency 11/26/2012    SCr 0.85 (.6-1.1), GFR 64 (>64) on 11/26/2012.  followed by Dr. Lal   • Rotator cuff tear     s/p bilateral surgical repair   • Scoliosis    • Spinal stenosis    • Spondylolisthesis    • Subdural hematoma (HCC) 5/30/2012    hospitalization, d/t fall, +UTI; neurosurgery f/u and CT (7/13/2012):  CT \"shows near complete resolution of previous left SDH\"   • Thyroid nodule     stable thyroid nodule 2011; followed by Dr. Lal   • URINARY INCONTINENCE    • Vitamin d deficiency 11/26/2012    vitamin D 25-OH serum total 33 (11/26/2012).  followed by Dr. Lal       FHx:  Family History   Problem Relation Age of Onset   • GI Disease Father         \"stomach issues\"   • Diabetes Father    • GI Disease Son         Crohn's       SHx:  Social History     Socioeconomic History   • Marital status:      Spouse name: Not on file   • Number of children: Not on file   • Years of education: Not on file   • Highest education level: Not on file   Occupational History   • Not on file   Social Needs   • Financial resource strain: Not on file   • Food insecurity:     Worry: Not on file     Inability: Not on file   • Transportation needs:     Medical: Not on file     Non-medical: Not on file "   Tobacco Use   • Smoking status: Never Smoker   • Smokeless tobacco: Never Used   Substance and Sexual Activity   • Alcohol use: Yes     Comment: occasional wine   • Drug use: No   • Sexual activity: Never   Lifestyle   • Physical activity:     Days per week: Not on file     Minutes per session: Not on file   • Stress: Not on file   Relationships   • Social connections:     Talks on phone: Not on file     Gets together: Not on file     Attends Congregational service: Not on file     Active member of club or organization: Not on file     Attends meetings of clubs or organizations: Not on file     Relationship status: Not on file   • Intimate partner violence:     Fear of current or ex partner: Not on file     Emotionally abused: Not on file     Physically abused: Not on file     Forced sexual activity: Not on file   Other Topics Concern   • Not on file   Social History Narrative   • Not on file       Allergies:  Allergies   Allergen Reactions   • Donepezil    • Pcn [Penicillins]      Hives  Per patient has taken Keflex without any reaction in the past.       Medications:  No current facility-administered medications on file prior to encounter.      Current Outpatient Medications on File Prior to Encounter   Medication Sig Dispense Refill   • atorvastatin (LIPITOR) 10 MG Tab Take 10 mg by mouth every evening.     • citalopram (CELEXA) 20 MG Tab Take 20 mg by mouth every day.     • pantoprazole (PROTONIX) 40 MG Tablet Delayed Response Take 40 mg by mouth every day.     • solifenacin (VESICARE) 5 MG tablet Take 10 mg by mouth every day.     • modafinil (PROVIGIL) 100 MG Tab Take 100 mg by mouth every day.     • levothyroxine (SYNTHROID) 25 MCG Tab Take 25 mcg by mouth Every morning on an empty stomach.     • denosumab (PROLIA) 60 MG/ML Solution Inject 60 mg as instructed every 6 months.     • donepezil (ARICEPT) 10 MG tablet Take 10 mg by mouth every evening.     • Albuterol Sulfate 108 (90 Base) MCG/ACT AEROSOL POWDER,  BREATH ACTIVATED Inhale 2 Puffs by mouth every 6 hours as needed. 3 Each 0   • estradiol (ESTRACE VAGINAL) 0.1 MG/GM vaginal cream Insert 1 g in vagina every day. Apply three times a week 42.5 g    • coenzyme Q-10 30 MG capsule Take 60 mg by mouth every day.     • meclizine (ANTIVERT) 25 MG Tab Take 25 mg by mouth 3 times a day as needed for Dizziness, Nausea/Vomiting or Vertigo.     • lidocaine (LIDODERM) 5 % Patch Apply 1 Patch to skin as directed every 24 hours.         Vitals:   Ambulatory Vitals  Encounter Vitals  Temperature: 36.7 °C (98 °F)  Temp src: Temporal  Blood Pressure : 137/114  Pulse: 68  Respiration: 18  Pulse Oximetry: 98 %  O2 (LPM): 2  O2 Delivery: Nasal Cannula  Weight: 54.4 kg (120 lb)  Weight Source: (!) Stated Weight  Height: 152.4 cm (5')  BMI (Calculated): 23.44  Breastfeeding Status: No    Labs:  Lab Results   Component Value Date/Time    SODIUM 133 (L) 08/08/2019 03:11 PM    POTASSIUM 4.4 08/08/2019 03:11 PM    CHLORIDE 99 08/08/2019 03:11 PM    CO2 22 08/08/2019 03:11 PM    GLUCOSE 112 (H) 08/08/2019 03:11 PM    BUN 12 08/08/2019 03:11 PM    CREATININE 0.83 08/08/2019 03:11 PM      Lab Results   Component Value Date/Time    PROTHROMBTM 14.6 08/08/2019 03:11 PM    INR 1.11 08/08/2019 03:11 PM     Lab Results   Component Value Date/Time    WBC 11.4 (H) 08/08/2019 03:10 PM    RBC 4.37 08/08/2019 03:10 PM    HEMOGLOBIN 13.7 08/08/2019 03:10 PM    HEMATOCRIT 41.0 08/08/2019 03:10 PM    MCV 93.8 08/08/2019 03:10 PM    MCH 31.4 08/08/2019 03:10 PM    MCHC 33.4 (L) 08/08/2019 03:10 PM    MPV 9.5 08/08/2019 03:10 PM    NEUTSPOLYS 84.80 (H) 08/08/2019 03:10 PM    LYMPHOCYTES 10.10 (L) 08/08/2019 03:10 PM    MONOCYTES 4.60 08/08/2019 03:10 PM    EOSINOPHILS 0.00 08/08/2019 03:10 PM    BASOPHILS 0.10 08/08/2019 03:10 PM        Imaging:  CT head NAF     Impression       Moderate calcified plaque right carotid artery bifurcation. No significant stenosis.  Left carotid arteries appear  patent.  Vertebral arteries appear patent.  Focal enlargement of right lingual tonsils measuring 12.6 mm in diameter. Patient's condition permits, would consider further evaluation.     CT perfusion penumbra 60 ml   Mismatch perfusion     Physical Exam:     General: Confusion, LHB weakness , facial droop  R gaze preference   Cardio: Normal S1/S2. No peripheral edema.   Pulm: CTAX2. No respiratory distress.   Skin: Warm, dry, no rashes or lesions   HEENT: Atraumatic head, normal sclera and conjunctiva, moist oral mucosa. No lid lag.  Abdomen: Soft, non tender. No masses or hepatosplenomegaly.    Physical Exam:  Confused,dysartrhic   NIH Stroke Scale:    1a. Level of Consciousness (Alert, drowsy, etc): 1= Drowsy    1b. LOC Questions (Month, age): 2= Incorrect    1c. LOC Commands (Open/close eyes make fist/let go): 1= Obeys one correctly    2.   Best Gaze (Eyes open - patient follows examiner's finger on face): 1= Partial gaze palay    3.   Visual Fields (introduce visual stimulus/threat to patient's field quadrants): 0= No visual loss  4.   Facial Paresis (Show teeth, raise eyebrows and squeeze eyes shut): 1= Minor     5a. Motor Arm - Left (Elevate arm to 90 degrees if patient is sitting, 45 degrees if  supine): 4= No movement    5b. Motor Arm - Right (Elevate arm to 90 degrees if patient is sitting, 45 degrees if supine): 0= No drift    6a. Motor Leg - Left (Elevate leg 30 degrees with patient supine): 4= No movement    6b. Motor Leg - Right  (Elevate leg 30 degrees with patient supine): 0= No drift    7.   Limb Ataxia (Finger-nose, heel down shin): 2- Present in 2 limbs    8.   Sensory (Pin prick to face, arm, trunk and leg - compare side to side): 1= Partial loss    9.  Best Language (Name item, describe a picture and read sentences): 1= Mild to moderate aphasia    10. Dysarthria (Evaluate speech clarity by patient repeating listed words): 1= Mild to moderate slurring    11. Extinction and Inattention (Use  information from prior testing to identify neglect or  double simultaneous stimuli testing): 0= No neglect    Total NIH Score: 14     Assessment/Plan:    R MCA ischemic stroke with R MCA bifurcation thrombus   Last seen normal last night therefore out of the window for iv alteplase  IR for R MCA thrombectomy  Checked on patient in ICU she is awake and speaking and minimally moving LUE and LLE   Plan:  ICU for post thrombectomy care   BP goal <160   No AC or antiplatelets after thrombectomy   Repeat CT head tomorrow   MRI brain w/o contrast   Telemetry   TTE with bubble study   Avoid hypoglycemia   Pt/OT/speech and swallow when able   NPO until then   NS for fluid     Neurology will continue to follow.      Plan:  Plan discussed with consulting physician and patient's nurse

## 2019-08-09 ENCOUNTER — APPOINTMENT (OUTPATIENT)
Dept: RADIOLOGY | Facility: MEDICAL CENTER | Age: 84
DRG: 023 | End: 2019-08-09
Attending: INTERNAL MEDICINE
Payer: MEDICARE

## 2019-08-09 PROBLEM — G93.40 ACUTE ENCEPHALOPATHY: Status: ACTIVE | Noted: 2019-08-09

## 2019-08-09 PROBLEM — N30.00 ACUTE CYSTITIS WITHOUT HEMATURIA: Status: ACTIVE | Noted: 2019-08-09

## 2019-08-09 PROBLEM — J35.1 TONSILLAR ENLARGEMENT: Status: ACTIVE | Noted: 2019-08-09

## 2019-08-09 PROBLEM — D50.0 ANEMIA, BLOOD LOSS: Status: ACTIVE | Noted: 2019-08-09

## 2019-08-09 LAB
ANION GAP SERPL CALC-SCNC: 12 MMOL/L (ref 0–11.9)
BASOPHILS # BLD AUTO: 0.2 % (ref 0–1.8)
BASOPHILS # BLD: 0.02 K/UL (ref 0–0.12)
BUN SERPL-MCNC: 10 MG/DL (ref 8–22)
CALCIUM SERPL-MCNC: 7.4 MG/DL (ref 8.5–10.5)
CHLORIDE SERPL-SCNC: 105 MMOL/L (ref 96–112)
CHOLEST SERPL-MCNC: 103 MG/DL (ref 100–199)
CO2 SERPL-SCNC: 20 MMOL/L (ref 20–33)
CREAT SERPL-MCNC: 0.69 MG/DL (ref 0.5–1.4)
EOSINOPHIL # BLD AUTO: 0 K/UL (ref 0–0.51)
EOSINOPHIL NFR BLD: 0 % (ref 0–6.9)
ERYTHROCYTE [DISTWIDTH] IN BLOOD BY AUTOMATED COUNT: 43.9 FL (ref 35.9–50)
EST. AVERAGE GLUCOSE BLD GHB EST-MCNC: 123 MG/DL
GLUCOSE SERPL-MCNC: 96 MG/DL (ref 65–99)
HBA1C MFR BLD: 5.9 % (ref 0–5.6)
HCT VFR BLD AUTO: 27.3 % (ref 37–47)
HCT VFR BLD AUTO: 29.6 % (ref 37–47)
HDLC SERPL-MCNC: 35 MG/DL
HGB BLD-MCNC: 8.5 G/DL (ref 12–16)
HGB BLD-MCNC: 9.7 G/DL (ref 12–16)
IMM GRANULOCYTES # BLD AUTO: 0.04 K/UL (ref 0–0.11)
IMM GRANULOCYTES NFR BLD AUTO: 0.4 % (ref 0–0.9)
LDLC SERPL CALC-MCNC: 53 MG/DL
LYMPHOCYTES # BLD AUTO: 1.42 K/UL (ref 1–4.8)
LYMPHOCYTES NFR BLD: 14.6 % (ref 22–41)
MCH RBC QN AUTO: 30.1 PG (ref 27–33)
MCHC RBC AUTO-ENTMCNC: 31.1 G/DL (ref 33.6–35)
MCV RBC AUTO: 96.8 FL (ref 81.4–97.8)
MONOCYTES # BLD AUTO: 0.93 K/UL (ref 0–0.85)
MONOCYTES NFR BLD AUTO: 9.6 % (ref 0–13.4)
NEUTROPHILS # BLD AUTO: 7.32 K/UL (ref 2–7.15)
NEUTROPHILS NFR BLD: 75.2 % (ref 44–72)
NRBC # BLD AUTO: 0 K/UL
NRBC BLD-RTO: 0 /100 WBC
PLATELET # BLD AUTO: 128 K/UL (ref 164–446)
PMV BLD AUTO: 9.5 FL (ref 9–12.9)
POTASSIUM SERPL-SCNC: 4.2 MMOL/L (ref 3.6–5.5)
RBC # BLD AUTO: 2.82 M/UL (ref 4.2–5.4)
SODIUM SERPL-SCNC: 137 MMOL/L (ref 135–145)
TRIGL SERPL-MCNC: 75 MG/DL (ref 0–149)
WBC # BLD AUTO: 9.7 K/UL (ref 4.8–10.8)

## 2019-08-09 PROCEDURE — 85018 HEMOGLOBIN: CPT

## 2019-08-09 PROCEDURE — 80061 LIPID PANEL: CPT

## 2019-08-09 PROCEDURE — 92610 EVALUATE SWALLOWING FUNCTION: CPT

## 2019-08-09 PROCEDURE — 85014 HEMATOCRIT: CPT

## 2019-08-09 PROCEDURE — 97166 OT EVAL MOD COMPLEX 45 MIN: CPT

## 2019-08-09 PROCEDURE — 83036 HEMOGLOBIN GLYCOSYLATED A1C: CPT

## 2019-08-09 PROCEDURE — 770022 HCHG ROOM/CARE - ICU (200)

## 2019-08-09 PROCEDURE — 85025 COMPLETE CBC W/AUTO DIFF WBC: CPT

## 2019-08-09 PROCEDURE — 700102 HCHG RX REV CODE 250 W/ 637 OVERRIDE(OP): Performed by: HOSPITALIST

## 2019-08-09 PROCEDURE — A9270 NON-COVERED ITEM OR SERVICE: HCPCS | Performed by: HOSPITALIST

## 2019-08-09 PROCEDURE — 99233 SBSQ HOSP IP/OBS HIGH 50: CPT | Performed by: NURSE PRACTITIONER

## 2019-08-09 PROCEDURE — 700105 HCHG RX REV CODE 258: Performed by: INTERNAL MEDICINE

## 2019-08-09 PROCEDURE — 80048 BASIC METABOLIC PNL TOTAL CA: CPT

## 2019-08-09 PROCEDURE — 99291 CRITICAL CARE FIRST HOUR: CPT | Performed by: INTERNAL MEDICINE

## 2019-08-09 PROCEDURE — 70551 MRI BRAIN STEM W/O DYE: CPT

## 2019-08-09 PROCEDURE — 700111 HCHG RX REV CODE 636 W/ 250 OVERRIDE (IP): Performed by: INTERNAL MEDICINE

## 2019-08-09 PROCEDURE — 302136 NUTRITION PUMP: Performed by: INTERNAL MEDICINE

## 2019-08-09 PROCEDURE — 99233 SBSQ HOSP IP/OBS HIGH 50: CPT | Performed by: HOSPITALIST

## 2019-08-09 PROCEDURE — 97163 PT EVAL HIGH COMPLEX 45 MIN: CPT

## 2019-08-09 RX ORDER — DONEPEZIL HYDROCHLORIDE 5 MG/1
10 TABLET, FILM COATED ORAL NIGHTLY
Status: DISCONTINUED | OUTPATIENT
Start: 2019-08-09 | End: 2019-08-09

## 2019-08-09 RX ORDER — LABETALOL HYDROCHLORIDE 5 MG/ML
10 INJECTION, SOLUTION INTRAVENOUS EVERY 4 HOURS PRN
Status: DISCONTINUED | OUTPATIENT
Start: 2019-08-09 | End: 2019-08-28 | Stop reason: HOSPADM

## 2019-08-09 RX ORDER — ASPIRIN 325 MG
325 TABLET ORAL DAILY
Status: DISCONTINUED | OUTPATIENT
Start: 2019-08-09 | End: 2019-08-09

## 2019-08-09 RX ORDER — ACETAMINOPHEN 325 MG/1
650 TABLET ORAL EVERY 6 HOURS PRN
Status: DISCONTINUED | OUTPATIENT
Start: 2019-08-09 | End: 2019-08-18

## 2019-08-09 RX ORDER — BISACODYL 10 MG
10 SUPPOSITORY, RECTAL RECTAL
Status: DISCONTINUED | OUTPATIENT
Start: 2019-08-09 | End: 2019-08-19

## 2019-08-09 RX ORDER — ATORVASTATIN CALCIUM 40 MG/1
40 TABLET, FILM COATED ORAL NIGHTLY
Status: DISCONTINUED | OUTPATIENT
Start: 2019-08-09 | End: 2019-08-28 | Stop reason: HOSPADM

## 2019-08-09 RX ORDER — DONEPEZIL HYDROCHLORIDE 5 MG/1
10 TABLET, FILM COATED ORAL EVERY EVENING
Status: DISCONTINUED | OUTPATIENT
Start: 2019-08-09 | End: 2019-08-28 | Stop reason: HOSPADM

## 2019-08-09 RX ORDER — ATORVASTATIN CALCIUM 40 MG/1
40 TABLET, FILM COATED ORAL NIGHTLY
Status: DISCONTINUED | OUTPATIENT
Start: 2019-08-09 | End: 2019-08-09

## 2019-08-09 RX ORDER — ASPIRIN 81 MG/1
324 TABLET, CHEWABLE ORAL DAILY
Status: DISCONTINUED | OUTPATIENT
Start: 2019-08-09 | End: 2019-08-09

## 2019-08-09 RX ORDER — M-VIT,TX,IRON,MINS/CALC/FOLIC 27MG-0.4MG
1 TABLET ORAL DAILY
Status: ON HOLD | COMMUNITY
End: 2019-08-28

## 2019-08-09 RX ORDER — POLYETHYLENE GLYCOL 3350 17 G/17G
1 POWDER, FOR SOLUTION ORAL
Status: DISCONTINUED | OUTPATIENT
Start: 2019-08-09 | End: 2019-08-19

## 2019-08-09 RX ORDER — AMOXICILLIN 250 MG
2 CAPSULE ORAL 2 TIMES DAILY
Status: DISCONTINUED | OUTPATIENT
Start: 2019-08-09 | End: 2019-08-19

## 2019-08-09 RX ORDER — ASPIRIN 300 MG/1
300 SUPPOSITORY RECTAL DAILY
Status: DISCONTINUED | OUTPATIENT
Start: 2019-08-09 | End: 2019-08-09

## 2019-08-09 RX ORDER — MAGNESIUM OXIDE 400 MG/1
400 TABLET ORAL DAILY
Status: ON HOLD | COMMUNITY
End: 2019-08-28

## 2019-08-09 RX ORDER — LEVOTHYROXINE SODIUM 0.03 MG/1
25 TABLET ORAL
Status: DISCONTINUED | OUTPATIENT
Start: 2019-08-10 | End: 2019-08-28 | Stop reason: HOSPADM

## 2019-08-09 RX ORDER — ONDANSETRON 4 MG/1
4 TABLET, ORALLY DISINTEGRATING ORAL EVERY 4 HOURS PRN
Status: DISCONTINUED | OUTPATIENT
Start: 2019-08-09 | End: 2019-08-28 | Stop reason: HOSPADM

## 2019-08-09 RX ORDER — OLOPATADINE HYDROCHLORIDE 1 MG/ML
1 SOLUTION/ DROPS OPHTHALMIC PRN
COMMUNITY

## 2019-08-09 RX ADMIN — CEFTRIAXONE SODIUM 1 G: 1 INJECTION, POWDER, FOR SOLUTION INTRAMUSCULAR; INTRAVENOUS at 14:49

## 2019-08-09 RX ADMIN — DONEPEZIL HYDROCHLORIDE 10 MG: 5 TABLET, FILM COATED ORAL at 18:12

## 2019-08-09 RX ADMIN — SENNOSIDES, DOCUSATE SODIUM 2 TABLET: 50; 8.6 TABLET, FILM COATED ORAL at 18:12

## 2019-08-09 RX ADMIN — CALCIUM CHLORIDE 1 G: 100 INJECTION, SOLUTION INTRAVENOUS at 08:26

## 2019-08-09 RX ADMIN — SODIUM CHLORIDE: 9 INJECTION, SOLUTION INTRAVENOUS at 20:04

## 2019-08-09 RX ADMIN — SODIUM CHLORIDE: 9 INJECTION, SOLUTION INTRAVENOUS at 11:39

## 2019-08-09 RX ADMIN — ATORVASTATIN CALCIUM 40 MG: 40 TABLET, FILM COATED ORAL at 20:05

## 2019-08-09 RX ADMIN — ACETAMINOPHEN 650 MG: 325 TABLET, FILM COATED ORAL at 20:05

## 2019-08-09 ASSESSMENT — COGNITIVE AND FUNCTIONAL STATUS - GENERAL
CLIMB 3 TO 5 STEPS WITH RAILING: A LOT
STANDING UP FROM CHAIR USING ARMS: TOTAL
EATING MEALS: A LOT
EATING MEALS: TOTAL
DRESSING REGULAR LOWER BODY CLOTHING: A LOT
STANDING UP FROM CHAIR USING ARMS: A LOT
CLIMB 3 TO 5 STEPS WITH RAILING: TOTAL
MOBILITY SCORE: 12
MOVING TO AND FROM BED TO CHAIR: UNABLE
DRESSING REGULAR LOWER BODY CLOTHING: TOTAL
WALKING IN HOSPITAL ROOM: A LOT
TOILETING: TOTAL
TOILETING: A LOT
MOVING FROM LYING ON BACK TO SITTING ON SIDE OF FLAT BED: A LOT
SUGGESTED CMS G CODE MODIFIER MOBILITY: CL
DAILY ACTIVITIY SCORE: 12
DAILY ACTIVITIY SCORE: 8
MOVING TO AND FROM BED TO CHAIR: A LOT
TURNING FROM BACK TO SIDE WHILE IN FLAT BAD: UNABLE
WALKING IN HOSPITAL ROOM: TOTAL
SUGGESTED CMS G CODE MODIFIER DAILY ACTIVITY: CM
TURNING FROM BACK TO SIDE WHILE IN FLAT BAD: A LOT
SUGGESTED CMS G CODE MODIFIER DAILY ACTIVITY: CL
DRESSING REGULAR UPPER BODY CLOTHING: A LOT
MOBILITY SCORE: 6
PERSONAL GROOMING: A LOT
DRESSING REGULAR UPPER BODY CLOTHING: A LOT
MOVING FROM LYING ON BACK TO SITTING ON SIDE OF FLAT BED: UNABLE
SUGGESTED CMS G CODE MODIFIER MOBILITY: CN
HELP NEEDED FOR BATHING: A LOT
PERSONAL GROOMING: A LOT
HELP NEEDED FOR BATHING: TOTAL

## 2019-08-09 ASSESSMENT — PATIENT HEALTH QUESTIONNAIRE - PHQ9
2. FEELING DOWN, DEPRESSED, IRRITABLE, OR HOPELESS: NOT AT ALL
1. LITTLE INTEREST OR PLEASURE IN DOING THINGS: NOT AT ALL
2. FEELING DOWN, DEPRESSED, IRRITABLE, OR HOPELESS: NOT AT ALL
SUM OF ALL RESPONSES TO PHQ9 QUESTIONS 1 AND 2: 0
SUM OF ALL RESPONSES TO PHQ9 QUESTIONS 1 AND 2: 0
1. LITTLE INTEREST OR PLEASURE IN DOING THINGS: NOT AT ALL

## 2019-08-09 ASSESSMENT — LIFESTYLE VARIABLES
AVERAGE NUMBER OF DAYS PER WEEK YOU HAVE A DRINK CONTAINING ALCOHOL: 0
TOTAL SCORE: 0
DOES PATIENT WANT TO STOP DRINKING: NO
HOW MANY TIMES IN THE PAST YEAR HAVE YOU HAD 5 OR MORE DRINKS IN A DAY: 0
EVER HAD A DRINK FIRST THING IN THE MORNING TO STEADY YOUR NERVES TO GET RID OF A HANGOVER: NO
TOTAL SCORE: 0
ON A TYPICAL DAY WHEN YOU DRINK ALCOHOL HOW MANY DRINKS DO YOU HAVE: 0
TOTAL SCORE: 0
HAVE YOU EVER FELT YOU SHOULD CUT DOWN ON YOUR DRINKING: NO
HAVE PEOPLE ANNOYED YOU BY CRITICIZING YOUR DRINKING: NO
CONSUMPTION TOTAL: NEGATIVE
EVER_SMOKED: NEVER
ALCOHOL_USE: NO
EVER FELT BAD OR GUILTY ABOUT YOUR DRINKING: NO

## 2019-08-09 ASSESSMENT — GAIT ASSESSMENTS: GAIT LEVEL OF ASSIST: UNABLE TO PARTICIPATE

## 2019-08-09 NOTE — PROGRESS NOTES
Pt arrived to unit at 1815 post thrombectomy in IR. PT transported by IR Rn's. Pt VSS stable, NIH performed at bedside with IR Rn Philip and writer. Score 21. Rt groin site CDI, soft, pulses +2.      Jones replaced, pictures taken of wounds. 2RN skin check completed with oncoming RN Aaliyah.

## 2019-08-09 NOTE — CARE PLAN
Problem: Nutritional:  Goal: Nutrition support tolerated and meeting greater than 85% of estimated needs  Outcome: NOT MET

## 2019-08-09 NOTE — PROGRESS NOTES
[facility] Medicine Daily Progress Note    Date of Service  8/9/2019    Chief Complaint  88 y.o. female admitted 8/8/2019 with stroke with left hemiparesis underwent thrombectomy at 17:46    Hospital Course         Interval Problem Update  ZURI    SR  -150  Tmax 99.5  On RA  Failed SLP eval  Hg 13.7--> 8.5        Consultants/Specialty  Neurology  CC    Code Status  DNAR/I ok    Disposition  TBD    Review of Systems  Review of Systems   Unable to perform ROS: Medical condition        Physical Exam  Temp:  [36.7 °C (98 °F)-37.9 °C (100.3 °F)] 37.5 °C (99.5 °F)  Pulse:  [67-94] 79  Resp:  [14-77] 20  BP: (119-186)/() 128/62  SpO2:  [92 %-100 %] 98 %    Physical Exam   Constitutional: She appears well-developed and well-nourished.   HENT:   Head: Normocephalic.   Right Ear: External ear normal.   Left Ear: External ear normal.   Mouth/Throat: No oropharyngeal exudate.   Left ecchymosis   Eyes: Conjunctivae are normal. Right eye exhibits no discharge. Left eye exhibits no discharge. No scleral icterus.   Neck: Neck supple. No JVD present. No tracheal deviation present.   Cardiovascular: Normal rate and regular rhythm. Exam reveals no gallop and no friction rub.   No murmur heard.  Pulmonary/Chest: Effort normal. No stridor. No respiratory distress. She has decreased breath sounds. She has no wheezes. She has no rales. She exhibits no tenderness.   Abdominal: Soft. Bowel sounds are normal. She exhibits no distension. There is no tenderness. There is no rebound.   Musculoskeletal: She exhibits no edema or tenderness.   Neurological: No cranial nerve deficit. She exhibits abnormal muscle tone.   Lethargic  Follows command in her right upper and right lower extremities  Left hemiparesis   Skin: Skin is warm and dry. She is not diaphoretic. No cyanosis. Nails show no clubbing.   Psychiatric: She is slowed. Cognition and memory are impaired. She is noncommunicative.   Nursing note and vitals  reviewed.      Fluids    Intake/Output Summary (Last 24 hours) at 8/9/2019 0929  Last data filed at 8/9/2019 0600  Gross per 24 hour   Intake 643.25 ml   Output 710 ml   Net -66.75 ml       Laboratory  Recent Labs     08/08/19  1300 08/08/19  1510 08/09/19  0235   WBC 12.9* 11.4* 9.7   RBC 4.46 4.37 2.82*   HEMOGLOBIN 13.8 13.7 8.5*   HEMATOCRIT 41.1 41.0 27.3*   MCV 92.2 93.8 96.8   MCH 30.9 31.4 30.1   MCHC 33.6 33.4* 31.1*   RDW 12.2 42.2 43.9   PLATELETCT 202 182 128*   MPV 9.4 9.5 9.5     Recent Labs     08/08/19  1300 08/08/19  1511 08/09/19  0300   SODIUM 135* 133* 137   POTASSIUM 4.4 4.4 4.2   CHLORIDE 98 99 105   CO2 21 22 20   GLUCOSE 122* 112* 96   BUN 13 12 10   CREATININE 1.0 0.83 0.69   CALCIUM 8.6 8.8 7.4*     Recent Labs     08/07/19  1143 08/08/19  1511   INR 1.08 1.11     Recent Labs     08/08/19  1300   BNPBTYPENAT 223*     Recent Labs     08/09/19  0300   TRIGLYCERIDE 75   HDL 35*   LDL 53       Imaging  DX-CHEST-PORTABLE (1 VIEW)   Final Result      1.  No acute cardiopulmonary disease.   2.  Contrast present within mildly dilated LEFT renal collecting system.      IR-THROMBO MECHANICAL ARTERY,INIT   Final Result      1.  Occlusion of the right middle cerebral artery, M2 segment.      2.  Successful cerebral thrombectomy performed with post intervention angiogram demonstrating patent right middle cerebral artery vessels.      Findings were discussed with Dr. Miramontes at approximately 1755 hrs.      OUTSIDE IMAGES-CT HEAD   Final Result      OUTSIDE IMAGES-CT HEAD   Final Result      CT-CTA NECK WITH & W/O-POST PROCESSING   Final Result      Moderate calcified plaque right carotid artery bifurcation. No significant stenosis.   Left carotid arteries appear patent.   Vertebral arteries appear patent.   Focal enlargement of right lingual tonsils measuring 12.6 mm in diameter. Patient's condition permits, would consider further evaluation.      CT-CEREBRAL PERFUSION ANALYSIS   Final Result      1.   Cerebral blood flow less than 30% likely representing completed infarct = 38 mL.      2.  T Max more than 6 seconds likely representing combination of completed infarct and ischemia = 98 mL.      3.  Mismatched volume likely representing ischemic brain/penumbra = 60 mL      4.  Please note that the cerebral perfusion was performed on the limited brain tissue around the basal ganglia region. Infarct/ischemia outside the CT perfusion sections can be missed in this study.      CT-CTA HEAD WITH & W/O-POST PROCESS    (Results Pending)   EC-ECHOCARDIOGRAM COMPLETE W/ CONT    (Results Pending)   MR-BRAIN-W/O    (Results Pending)        Assessment/Plan  * CVA (cerebral vascular accident) (HCC)- (present on admission)  Assessment & Plan  Status post thrombectomy  MRI with hemorrhagic transformation   Discussed with neurology     No antiplatelets given hemorrhagic transformation   Increase atorvastatin 40 mg   Blood pressure control   PT/OT/SLP as tolerated  Aspiration precautions      Tonsillar enlargement  Assessment & Plan  Noted on CT  Will need further work-up depending on clinical recovery    Anemia, blood loss  Assessment & Plan  Monitor cbc     Acute encephalopathy  Assessment & Plan  Likely related to her stroke        DNR (do not resuscitate)- (present on admission)  Assessment & Plan        Hyponatremia- (present on admission)  Assessment & Plan  Improved with hydration monitor    Leukocytosis  Assessment & Plan  Secondary to UTI with positive cultures at outside hospital    Patient will be started on ceftriaxone and follow-up on final cultures    CAD (coronary artery disease)- (present on admission)  Assessment & Plan  Increase atorvastatin  Hold antiplatelets given hemorrhagic transformation      Dementia- (present on admission)  Assessment & Plan  Continue Aricept  Avoid sedating agents         Overall prognosis is guarded we will consult palliative care  Discussed with nursing staff pharmacist neurology and  critical care    VTE prophylaxis: SCD

## 2019-08-09 NOTE — ASSESSMENT & PLAN NOTE
-Status post thrombectomy  -MRI with hemorrhagic transformation   -dysphagia requiring Cortrack for tube feedings.   -expressive aphasia  -left-sided neglect and extremity flaccidity  -c/o headaches today  -Palliative Care recommended Hospice consult  -ASA and statin  -CM/SS assisting with placement/Hospice/Family  Patient family agree with hospice

## 2019-08-09 NOTE — ASSESSMENT & PLAN NOTE
-likely multi-factorial - acute cystitis (s/p treatment), CVA, worsening baseline dementia, hospitalization  -arouses to voice today. Answers orientation questions correctly.   -no behavioral outbursts  -frequent reorientation, sleep hygiene  -Fall Precautions  -avoid benzos/narcotics  Patient family agree with hospice

## 2019-08-09 NOTE — PROGRESS NOTES
Chief Complaint   Patient presents with   • Possible Stroke     Last known well 1030PM on 8/7/19       Problem List Items Addressed This Visit     None      Visit Diagnoses     Acute CVA (cerebrovascular accident) (HCC)        Relevant Medications    atorvastatin (LIPITOR) 10 MG Tab    enalaprilat (VASOTEC) injection 1.25 mg    atorvastatin (LIPITOR) tablet 40 mg (Start on 8/9/2019  9:00 PM)      Neurology Stroke Progress Note    Brief History of present illness:  88-year old female with PMhx significant for CAD, dyslipidemia, anxiety/depression who presented to [facility] as a transfer from [facility] on 8/8/19 for a chief complaint of Left sided weakness and Right gaze preference; patient initially presented to [facility] on 8/7/19 for reports of generalized weakness; unclear if patient had focal deficits at that time however NIHSS documented as 0 and CT head at that time unremarkable. Patient was thought to have dehydration at that time. At any rate, she was sent home; went to sleep that evening, and was found at 1130 the following morning (on 8/8) with LUE/LLE flaccid, Left hemineglect, and Right gaze. Presented again to [facility] [place], found to have Right MCA occlusion per CTA, and was ultimately transferred here for IR thrombectomy. Now s/p Right MCA thrombectomy with TICI 3.     Interval, 8/9/19:   Patient laying in bed; arousable to voice, drowsy. Follows simple commands, more briskly to Right. Minimal verbal output. SBP 150s-160s. No events overnight per nursing.     No changes to HPI as was documented on 8/8/19.     Past medical history:   Past Medical History:   Diagnosis Date   • Advanced directives, counseling/discussion 2012    AND/DNR   • Allergic rhinitis    • ASTHMA    • CAD (coronary artery disease)    • Carpal tunnel syndrome     s/p bilateral surgical release   • Cataracts, bilateral     s/p surgery   • Cervical spine pain    • Chest wall contusion 5/31/2013   • Chronic low back pain   "  • Degenerative arthritis of spine    • Dementia     MMSE 17/30 (5/15/2012)   • Depression with anxiety    • Diarrhea    • Diverticulitis    • Dizziness 3/2012    hospitalization:  mastoiditis vs. vertigo vs. narcolepsy vs. syncope vs. seizure   • Dry eye syndrome    • Dry mouth    • Dyslipidemia 2/9/2012    , TG 64, HDL 49, nonHDL 205, , TC/HDL 5.18 (2/9/2012)   • Falls frequently     ambulates with cane, power chair 5/2012   • Foot fracture, right 2011   • Fracture of metacarpal of right hand, closed 5/31/2013    4th and 5th metacarpal fx   • Gallstones     s/p cholecystectomy   • GERD (gastroesophageal reflux disease)     on PPI, followed by GI   • H. pylori infection    • H/O colonoscopy 2005, 2008    microscopic colitis, diverticulosis, hemorhoids (2005).  internal hemorrhoids (2008)   • Internal hemorrhoids    • Laceration of head 5/31/2013   • Microscopic colitis     on asacol, followed by GI   • Migraine     topamax   • OSTEOPOROSIS     followed by Dr. Lal   • overactive bladder    • PUD (peptic ulcer disease)    • Radiculopathy    • Renal insufficiency 11/26/2012    SCr 0.85 (.6-1.1), GFR 64 (>64) on 11/26/2012.  followed by Dr. Lal   • Rotator cuff tear     s/p bilateral surgical repair   • Scoliosis    • Spinal stenosis    • Spondylolisthesis    • Subdural hematoma (HCC) 5/30/2012    hospitalization, d/t fall, +UTI; neurosurgery f/u and CT (7/13/2012):  CT \"shows near complete resolution of previous left SDH\"   • Thyroid nodule     stable thyroid nodule 2011; followed by Dr. Lal   • URINARY INCONTINENCE    • Vitamin d deficiency 11/26/2012    vitamin D 25-OH serum total 33 (11/26/2012).  followed by Dr. Lal       Past surgical history:   Past Surgical History:   Procedure Laterality Date   • HYSTERECTOMY, TOTAL ABDOMINAL  1998   • KNEE REPLACEMENT, TOTAL  1993    left   • HIATAL HERNIA REPAIR  1993   • ARTHROSCOPY, KNEE      right   • BLADDER SLING FEMALE     • CARPAL TUNNEL " "RELEASE      bilateral   • CATARACT EXTRACTION WITH IOL     • CHOLECYSTECTOMY     • OTHER ORTHOPEDIC SURGERY      hammertoe   • ROTATOR CUFF REPAIR      bilateral   • TRIGGER FINGER RELEASE      x3       Family history:   Family History   Problem Relation Age of Onset   • GI Disease Father         \"stomach issues\"   • Diabetes Father    • GI Disease Son         Crohn's       Social history:   Social History     Socioeconomic History   • Marital status:      Spouse name: Not on file   • Number of children: Not on file   • Years of education: Not on file   • Highest education level: Not on file   Occupational History   • Not on file   Social Needs   • Financial resource strain: Not on file   • Food insecurity:     Worry: Not on file     Inability: Not on file   • Transportation needs:     Medical: Not on file     Non-medical: Not on file   Tobacco Use   • Smoking status: Never Smoker   • Smokeless tobacco: Never Used   Substance and Sexual Activity   • Alcohol use: Yes     Comment: occasional wine   • Drug use: No   • Sexual activity: Never   Lifestyle   • Physical activity:     Days per week: Not on file     Minutes per session: Not on file   • Stress: Not on file   Relationships   • Social connections:     Talks on phone: Not on file     Gets together: Not on file     Attends Holiness service: Not on file     Active member of club or organization: Not on file     Attends meetings of clubs or organizations: Not on file     Relationship status: Not on file   • Intimate partner violence:     Fear of current or ex partner: Not on file     Emotionally abused: Not on file     Physically abused: Not on file     Forced sexual activity: Not on file   Other Topics Concern   • Not on file   Social History Narrative   • Not on file       Current medications:   Current Facility-Administered Medications   Medication Dose   • Pharmacy Consult: Enteral tube insertion - review meds/change route/product selection  1 Each   • " senna-docusate (PERICOLACE or SENOKOT S) 8.6-50 MG per tablet 2 Tab  2 Tab    And   • polyethylene glycol/lytes (MIRALAX) PACKET 1 Packet  1 Packet    And   • magnesium hydroxide (MILK OF MAGNESIA) suspension 30 mL  30 mL    And   • bisacodyl (DULCOLAX) suppository 10 mg  10 mg   • ondansetron (ZOFRAN ODT) dispertab 4 mg  4 mg   • atorvastatin (LIPITOR) tablet 40 mg  40 mg   • [START ON 8/10/2019] levothyroxine (SYNTHROID) tablet 25 mcg  25 mcg   • aspirin (ASA) chewable tab 324 mg  324 mg    Or   • aspirin (ASA) tablet 325 mg  325 mg    Or   • aspirin (ASA) suppository 300 mg  300 mg   • acetaminophen (TYLENOL) tablet 650 mg  650 mg   • donepezil (ARICEPT) tablet 10 mg  10 mg   • NS infusion     • enalaprilat (VASOTEC) injection 1.25 mg  1.25 mg   • ondansetron (ZOFRAN) syringe/vial injection 4 mg  4 mg       Medication Allergy:  Allergies   Allergen Reactions   • Donepezil    • Pcn [Penicillins]      Hives  Per patient has taken Keflex without any reaction in the past.       Review of systems:   As noted above; otherwise limited as patient is currently altered/lethargic.     Physical examination:   Vitals:    08/09/19 0800 08/09/19 0900 08/09/19 1000 08/09/19 1030   BP: 128/62 138/62 160/69 151/70   Pulse: 79 93 75 77   Resp: 20 (!) 36 (!) 31 (!) 35   Temp: 37.5 °C (99.5 °F)      TempSrc:       SpO2: 98% 90% 94% (!) 87%   Weight:       Height:         General: Patient in no acute distress, pleasant and cooperative.  HEENT: Normocephalic, no signs of acute trauma.   Neck: supple, no meningeal signs or carotid bruits. There is normal range of motion. No tenderness on exam.   Chest: clear to auscultation. No cough.   CV: RRR, no murmurs.   Skin: no signs of acute rashes or trauma.   Musculoskeletal: joints exhibit full range of motion, without any pain to palpation. There are no signs of joint or muscle swelling. There is no tenderness to deep palpation of muscles.   Psychiatric: No hallucinatory behavior. Denies  symptoms of depression or suicidal ideation. Mood and affect appear normal on exam.     NEUROLOGICAL EXAM:   Mental status, orientation: Drowsy, arousable, oriented to self and place.   Speech and language: Minimal verbal output, mildly dysarthric. Follow simple commands, more briskly to Right.   Cranial nerve exam: Pupils are 3-4 mm bilaterally and equally reactive to light and accommodation. Visual fields are intact by confrontation. Patient has Right gaze preference Left facial weakness appreciated. Corneal reflexes decreased to the left. Tongue is midline and without any signs of tongue biting or fasciculations.  Shoulder shrug is intact bilaterally.   Motor exam: Strength is 5/5 in RUE/RLE. 1/5 to LUE, 2/5 to LLE. Increased tone to LLE, otherwise tone is normal. No abnormal movements were seen on exam.   Sensory exam RUE/RLE reveals normal sense of light touch and pinprick; diminished to the Left.  Deep tendon reflexes:  2+ throughout. Plantar responses are flexor on the Right, upgoing on the Left (positive babinski). There is no clonus.   Coordination: RUE shows a normal finger-nose-finger. LUE unable.   Gait: Not assessed at this time as patient is a fall risk.       NIH Stroke Scale    1a Level of Consciousness  1  1b Orientation Questions 1  1c Response to Commands   2 Gaze 1  3 Visual Fields   4 Facial Movement 1  5 Motor Function (arm)   a Left 3  b Right   6 Motor Function (leg)   a Left  2  b Right   7 Limb Ataxia   8 Sensory 1  9 Language   10 Articulation 1  11 Extinction/Inattention 1    Score: 12      ANCILLARY DATA REVIEWED:     Lab Data Review:  Recent Results (from the past 24 hour(s))   CBC WITH DIFFERENTIAL    Collection Time: 08/08/19  1:00 PM   Result Value Ref Range    WBC 12.9 (H) 4.8 - 10.8 K/uL    RBC 4.46 4.20 - 5.40 M/uL    Hemoglobin 13.8 13.0 - 17.0 g/dL    Hematocrit 41.1 39.0 - 50.0 %    MCV 92.2 81.0 - 99.0 fL    MCH 30.9 27.0 - 31.0 pg    MCHC 33.6 33.0 - 37.0 g/dL    RDW 12.2  11.5 - 14.5 %    Platelet Count 202 130 - 400 K/uL    MPV 9.4 7.4 - 10.4 fL    Neutrophils Automated 88.1 (H) 39.0 - 70.0 %    Lymphocytes Automated 6.2 (L) 21.0 - 50.0 %    Monocytes Automated 4.9 2.0 - 9.0 %    Eosinophils Automated 0.0 0.0 - 5.0 %    Basophils Automated 0.2 0.0 - 3.0 %    Abs Neutrophils Automated 11.4 (H) 1.8 - 7.7 K/uL    Abs Lymph Automated 0.8 (L) 1.2 - 4.8 K/uL    Eosinophil Count, Blood 0.00 0.00 - 0.50 K/uL   COMP METABOLIC PANEL    Collection Time: 08/08/19  1:00 PM   Result Value Ref Range    Sodium 135 (L) 136 - 145 mmol/L    Potassium 4.4 3.5 - 5.1 mmol/L    Chloride 98 98 - 107 mmol/L    Co2 21 21 - 32 mmol/L    Anion Gap 20 (H) 10 - 18 mmol/L    Glucose 122 (H) 74 - 99 mg/dL    Bun 13 7 - 18 mg/dL    Creatinine 1.0 0.6 - 1.0 mg/dL    Calcium 8.6 8.5 - 11.0 mg/dL    AST(SGOT) 28 15 - 37 U/L    ALT(SGPT) 19 12 - 78 U/L    Alkaline Phosphatase 82 46 - 116 U/L    Total Bilirubin 0.7 0.2 - 1.0 mg/dL    Albumin 3.6 3.4 - 5.0 g/dL    Total Protein 8.1 6.4 - 8.2 g/dL    A-G Ratio 0.8    TROPONIN    Collection Time: 08/08/19  1:00 PM   Result Value Ref Range    Troponin I 0.04 0.00 - 0.06 ng/mL   BTYPE NATRIURETIC PEPTIDE    Collection Time: 08/08/19  1:00 PM   Result Value Ref Range    B Natriuretic Peptide 223 (H) 5 - 179 pg/mL   LACTIC ACID    Collection Time: 08/08/19  1:00 PM   Result Value Ref Range    Lactic Acid 1.4 0.4 - 2.0 mmol/L   URINALYSIS,CULTURE IF INDICATED    Collection Time: 08/08/19  1:00 PM   Result Value Ref Range    Color YELLOW     Character CLEAR     Specific Gravity 1.025 1.003 - 1.030    Ph 5.5 5.0 - 8.0    Glucose NEGATIVE Negative mg/dL    Ketones >=80 (A) Negative mg/dL    Protein TRACE (A) Negative mg/dL    Bilirubin NEGATIVE Negative    Urobilinogen, Urine 0.2 0.2 - 1.0 mg/dL    Nitrite POSITIVE (A) Negative    Leukocyte Esterase NEGATIVE Negative    Occult Blood MODERATE (A) Negative    Culture Indicated Yes UA Culture   ESTIMATED GFR    Collection Time:  19  1:00 PM   Result Value Ref Range    GFR If African American >60 >60 mL/min/1.73 m 2    GFR If Non African American 52 (A) >60 mL/min/1.73 m 2   URINE MICROSCOPIC (W/UA)    Collection Time: 19  1:00 PM   Result Value Ref Range    WBC 6-10 (A) 0 - 6 /hpf    RBC 0-2 0 - 3 /hpf    Bacteria 3+ (A) None /hpf    Epithelial Cells None Seen None /hpf   URINE CULTURE(NEW)    Collection Time: 19  1:00 PM   Result Value Ref Range    Significant Indicator POS (POS)     Source UR     Site Cath     Urine Culture (A)      >100,000 cfu/mL gram negative rods  : identification & sensitivity to follow.     EKG    Collection Time: 19  2:40 PM   Result Value Ref Range    Report       [facility] Emergency Dept.    Test Date:  2019  Pt Name:    CLAUDIO ESPINOZA              Department: ER  MRN:        [id number]                      [place]       [facility]  [place]:     Female                       Technician: [id number]  :        [id number]                   Requested By:ER TRIAGE PROTOCOL  Order #:    [id number]                    Reading MD: DAYA VELARDE MD    Measurements  Intervals                                Axis  Rate:       72                           P:          62  GA:         156                          QRS:        7  QRSD:       70                           T:          37  QT:         396  QTc:        434    Interpretive Statements  SINUS RHYTHM  No previous ECG available for comparison    Electronically Signed On 2019 16:35:46 PDT by DAYA VELARDE MD     CBC WITH DIFFERENTIAL    Collection Time: 19  3:10 PM   Result Value Ref Range    WBC 11.4 (H) 4.8 - 10.8 K/uL    RBC 4.37 4.20 - 5.40 M/uL    Hemoglobin 13.7 12.0 - 16.0 g/dL    Hematocrit 41.0 37.0 - 47.0 %    MCV 93.8 81.4 - 97.8 fL    MCH 31.4 27.0 - 33.0 pg    MCHC 33.4 (L) 33.6 - 35.0 g/dL    RDW 42.2 35.9 - 50.0 fL    Platelet Count 182 164 - 446 K/uL    MPV 9.5 9.0 - 12.9 fL    Neutrophils-Polys 84.80 (H)  44.00 - 72.00 %    Lymphocytes 10.10 (L) 22.00 - 41.00 %    Monocytes 4.60 0.00 - 13.40 %    Eosinophils 0.00 0.00 - 6.90 %    Basophils 0.10 0.00 - 1.80 %    Immature Granulocytes 0.40 0.00 - 0.90 %    Nucleated RBC 0.00 /100 WBC    Neutrophils (Absolute) 9.69 (H) 2.00 - 7.15 K/uL    Lymphs (Absolute) 1.15 1.00 - 4.80 K/uL    Monos (Absolute) 0.52 0.00 - 0.85 K/uL    Eos (Absolute) 0.00 0.00 - 0.51 K/uL    Baso (Absolute) 0.01 0.00 - 0.12 K/uL    Immature Granulocytes (abs) 0.05 0.00 - 0.11 K/uL    NRBC (Absolute) 0.00 K/uL   COMP METABOLIC PANEL    Collection Time: 08/08/19  3:11 PM   Result Value Ref Range    Sodium 133 (L) 135 - 145 mmol/L    Potassium 4.4 3.6 - 5.5 mmol/L    Chloride 99 96 - 112 mmol/L    Co2 22 20 - 33 mmol/L    Anion Gap 12.0 (H) 0.0 - 11.9    Glucose 112 (H) 65 - 99 mg/dL    Bun 12 8 - 22 mg/dL    Creatinine 0.83 0.50 - 1.40 mg/dL    Calcium 8.8 8.5 - 10.5 mg/dL    AST(SGOT) 27 12 - 45 U/L    ALT(SGPT) 16 2 - 50 U/L    Alkaline Phosphatase 68 30 - 99 U/L    Total Bilirubin 0.6 0.1 - 1.5 mg/dL    Albumin 3.9 3.2 - 4.9 g/dL    Total Protein 7.6 6.0 - 8.2 g/dL    Globulin 3.7 (H) 1.9 - 3.5 g/dL    A-G Ratio 1.1 g/dL   TROPONIN    Collection Time: 08/08/19  3:11 PM   Result Value Ref Range    Troponin T 25 (H) 6 - 19 ng/L   PROTHROMBIN TIME    Collection Time: 08/08/19  3:11 PM   Result Value Ref Range    PT 14.6 12.0 - 14.6 sec    INR 1.11 0.87 - 1.13   ESTIMATED GFR    Collection Time: 08/08/19  3:11 PM   Result Value Ref Range    GFR If African American >60 >60 mL/min/1.73 m 2    GFR If Non African American >60 >60 mL/min/1.73 m 2   CBC WITH DIFFERENTIAL    Collection Time: 08/09/19  2:35 AM   Result Value Ref Range    WBC 9.7 4.8 - 10.8 K/uL    RBC 2.82 (L) 4.20 - 5.40 M/uL    Hemoglobin 8.5 (L) 12.0 - 16.0 g/dL    Hematocrit 27.3 (L) 37.0 - 47.0 %    MCV 96.8 81.4 - 97.8 fL    MCH 30.1 27.0 - 33.0 pg    MCHC 31.1 (L) 33.6 - 35.0 g/dL    RDW 43.9 35.9 - 50.0 fL    Platelet Count 128 (L) 164  - 446 K/uL    MPV 9.5 9.0 - 12.9 fL    Neutrophils-Polys 75.20 (H) 44.00 - 72.00 %    Lymphocytes 14.60 (L) 22.00 - 41.00 %    Monocytes 9.60 0.00 - 13.40 %    Eosinophils 0.00 0.00 - 6.90 %    Basophils 0.20 0.00 - 1.80 %    Immature Granulocytes 0.40 0.00 - 0.90 %    Nucleated RBC 0.00 /100 WBC    Neutrophils (Absolute) 7.32 (H) 2.00 - 7.15 K/uL    Lymphs (Absolute) 1.42 1.00 - 4.80 K/uL    Monos (Absolute) 0.93 (H) 0.00 - 0.85 K/uL    Eos (Absolute) 0.00 0.00 - 0.51 K/uL    Baso (Absolute) 0.02 0.00 - 0.12 K/uL    Immature Granulocytes (abs) 0.04 0.00 - 0.11 K/uL    NRBC (Absolute) 0.00 K/uL   Basic Metabolic Panel    Collection Time: 08/09/19  3:00 AM   Result Value Ref Range    Sodium 137 135 - 145 mmol/L    Potassium 4.2 3.6 - 5.5 mmol/L    Chloride 105 96 - 112 mmol/L    Co2 20 20 - 33 mmol/L    Glucose 96 65 - 99 mg/dL    Bun 10 8 - 22 mg/dL    Creatinine 0.69 0.50 - 1.40 mg/dL    Calcium 7.4 (L) 8.5 - 10.5 mg/dL    Anion Gap 12.0 (H) 0.0 - 11.9   Lipid Profile    Collection Time: 08/09/19  3:00 AM   Result Value Ref Range    Cholesterol,Tot 103 100 - 199 mg/dL    Triglycerides 75 0 - 149 mg/dL    HDL 35 (A) >=40 mg/dL    LDL 53 <100 mg/dL   ESTIMATED GFR    Collection Time: 08/09/19  3:00 AM   Result Value Ref Range    GFR If African American >60 >60 mL/min/1.73 m 2    GFR If Non African American >60 >60 mL/min/1.73 m 2   HEMOGLOBIN AND HEMATOCRIT    Collection Time: 08/09/19 11:35 AM   Result Value Ref Range    Hemoglobin 9.7 (L) 12.0 - 16.0 g/dL    Hematocrit 29.6 (L) 37.0 - 47.0 %       Labs reviewed by me.     Records reviewed:   Reviewed records from [facility] [place].       Imaging reviewed by me:     DX-CHEST-PORTABLE (1 VIEW)   Final Result      1.  No acute cardiopulmonary disease.   2.  Contrast present within mildly dilated LEFT renal collecting system.      IR-THROMBO MECHANICAL ARTERY,INIT   Final Result      1.  Occlusion of the right middle cerebral artery, M2 segment.      2.  Successful cerebral  thrombectomy performed with post intervention angiogram demonstrating patent right middle cerebral artery vessels.      Findings were discussed with Dr. Miramontes at approximately 1755 hrs.      OUTSIDE IMAGES-CT HEAD   Final Result      OUTSIDE IMAGES-CT HEAD   Final Result      CT-CTA NECK WITH & W/O-POST PROCESSING   Final Result      Moderate calcified plaque right carotid artery bifurcation. No significant stenosis.   Left carotid arteries appear patent.   Vertebral arteries appear patent.   Focal enlargement of right lingual tonsils measuring 12.6 mm in diameter. Patient's condition permits, would consider further evaluation.      CT-CEREBRAL PERFUSION ANALYSIS   Final Result      1.  Cerebral blood flow less than 30% likely representing completed infarct = 38 mL.      2.  T Max more than 6 seconds likely representing combination of completed infarct and ischemia = 98 mL.      3.  Mismatched volume likely representing ischemic brain/penumbra = 60 mL      4.  Please note that the cerebral perfusion was performed on the limited brain tissue around the basal ganglia region. Infarct/ischemia outside the CT perfusion sections can be missed in this study.      CT-CTA HEAD WITH & W/O-POST PROCESS    (Results Pending)   EC-ECHOCARDIOGRAM COMPLETE W/ CONT    (Results Pending)   MR-BRAIN-W/O    (Results Pending)       Presumed mechanism by TOAST:  __Large Artery Atherosclerosis  __Small Vessel (Lacunar)  __Cardioembolic  __Other (Sickle Cell, Vasculitis, Hypercoagulable)  _X_Unknown     Vs cardioembolic.     ASSESSMENT AND PLAN:  88-year old female with PMhx significant for CAD, dyslipidemia, anxiety/depression who presented to [facility] as a transfer from [facility] on 8/8/19 for a chief complaint of Left sided weakness and Right gaze preference; not a candidate for IV tPA secondary to presentation time greater than 4.5 hours from time of symptom onset. [place] [facility] [place], prompting  immediate transfer to [facility] for thrombectomy. Patient now s/p IR thrombectomy of Right MCA occlusion with TICI 3. NIHSS is currently 13-14.     Impression:  Right MCA bifurcation thrombus, s/p thrombectomy on 8/8/19 (TICI 3).  Probable associated/evolving Right MCA territory ischemic stroke.   Dyslipidemia.   Hypertension.   Hx of CAD.     Recommendations/Plan:     -Neuro assessment and VS per post thrombectomy protocol; SBP < 160.    -MRI Brain wo contrast is pending.   -No blood thinners/antiplatelets x 24 hours post thrombectomy; may initiate ASA if post thrombectomy imaging is without hemorrhage.   -Telemetry; currently SR. Screen for Afib/arrhtyhmia. Obtain TTE.   -Atorvastatin 80 mg PO/NGT q HS. Check lipid panel.   -Check lipid panel, A1c, Troponin, TSH, UA.  -Recommend aggressive BG management per primary team, Avoid IVF with Dextrose. BG goal 140-180.   -PT/OT/SLP eval and treat. Physiatry consult.   -Will follow up with results of the above and make additional recommendations accordingly.   -DVT Prophylaxis: SCDs.     The plan of care above has been discussed with Dr. Merino.     Cheryl Garcia MSN, BSN, AGNP-C  [facility]    **Addendum 8/9/19 1630-- Note MRI with acute Right MCA territory infarct, mild/moderate degree of hemorrhagic conversion; recommend to hold ASA for now. Plan to re-evaluate with CT head in 1-2 days to further determine when is appropriate to initiate ASA.

## 2019-08-09 NOTE — CONSULTS
Critical Care Consultation    Date of consult: 8/8/2019    Referring Physician  No att. providers found    Reason for Consultation  I was asked to provide neuro critical care consultation status post right MCA CVA with thrombectomy.    History of Presenting Illness  88 y.o. female who presented 8/8/2019 with recent fall that was evaluated in the emergency department at [facility].  At the time she was neurologically intact and a head CT was unremarkable.  She was subsequently discharged.  Apparently there was some notation that she had a right-sided gaze preference before she went to sleep. This morning she was found by her son with altered mental status, dysarthria left-sided facial droop and flaccid left upper and lower extremities.  She was taken back to the emergency department.  A repeat head CT revealed no acute changes.  She was subsequently transferred to [facility] for further evaluation.  Last known well 2200 on 8/7/2019.  Neurologic changes noted at 1130 on the morning of 8/8/2019.  Neurology was consulted.  NIH score-14.  CT perfusion revealed a penumbra of 60 mL's.  CT angio  revealed a right MCA large vessel occlusion.  She was taken to IR for thrombectomy.    Code Status  DNAR, I OK    Review of Systems  Review of Systems   Unable to perform ROS: Mental acuity       Past Medical History   has a past medical history of Advanced directives, counseling/discussion (2012), Allergic rhinitis, ASTHMA, CAD (coronary artery disease), Carpal tunnel syndrome, Cataracts, bilateral, Cervical spine pain, Chest wall contusion (5/31/2013), Chronic low back pain, Degenerative arthritis of spine, Dementia, Depression with anxiety, Diarrhea, Diverticulitis, Dizziness (3/2012), Dry eye syndrome, Dry mouth, Dyslipidemia (2/9/2012), Falls frequently, Foot fracture, right (2011), Fracture of metacarpal of right hand, closed (5/31/2013), Gallstones, GERD (gastroesophageal reflux disease), H. pylori  infection, H/O colonoscopy (2005, 2008), Internal hemorrhoids, Laceration of head (5/31/2013), Microscopic colitis, Migraine, OSTEOPOROSIS, overactive bladder, PUD (peptic ulcer disease), Radiculopathy, Renal insufficiency (11/26/2012), Rotator cuff tear, Scoliosis, Spinal stenosis, Spondylolisthesis, Subdural hematoma (HCC) (5/30/2012), Thyroid nodule, URINARY INCONTINENCE, and Vitamin d deficiency (11/26/2012).    Surgical History   has a past surgical history that includes knee replacement, total (1993); arthroscopy, knee; rotator cuff repair; carpal tunnel release; trigger finger release; other orthopedic surgery; bladder sling female; hiatal hernia repair (1993); hysterectomy, total abdominal (1998); cholecystectomy; and cataract extraction with iol.    Family History  family history includes Diabetes in her father; GI Disease in her father and son.    Social History   reports that she has never smoked. She has never used smokeless tobacco. She reports that she drinks alcohol. She reports that she does not use drugs.    Medications  Home Medications     Reviewed by Daniella Lunsford (Pharmacy Tech) on 08/08/19 at 1609  Med List Status: Complete   Medication Last Dose Status   Albuterol Sulfate 108 (90 Base) MCG/ACT AEROSOL POWDER, BREATH ACTIVATED UNK Active   atorvastatin (LIPITOR) 10 MG Tab UNK Active   citalopram (CELEXA) 20 MG Tab UNK Active   coenzyme Q-10 30 MG capsule UNK Active   denosumab (PROLIA) 60 MG/ML Solution UNK Active   donepezil (ARICEPT) 10 MG tablet UNK Active   estradiol (ESTRACE VAGINAL) 0.1 MG/GM vaginal cream UNK Active   levothyroxine (SYNTHROID) 25 MCG Tab UNK Active   lidocaine (LIDODERM) 5 % Patch UNK Active   meclizine (ANTIVERT) 25 MG Tab UNK Active   modafinil (PROVIGIL) 100 MG Tab UNK Active   pantoprazole (PROTONIX) 40 MG Tablet Delayed Response UNK Active   solifenacin (VESICARE) 5 MG tablet UNK Active              Current Facility-Administered Medications   Medication  Dose Route Frequency Provider Last Rate Last Dose   • levothyroxine (SYNTHROID) tablet 25 mcg  25 mcg Oral AM ES Glory Mendiola M.D.       • donepezil (ARICEPT) tablet 10 mg  10 mg Oral Nightly Glory Mendiola M.D.   Stopped at 08/08/19 2100   • atorvastatin (LIPITOR) tablet 20 mg  20 mg Oral Nightly Glory Mendiola M.D.   Stopped at 08/08/19 2100   • acetaminophen (TYLENOL) tablet 650 mg  650 mg Oral Q6HRS PRN Glory Mendiola M.D.       • senna-docusate (PERICOLACE or SENOKOT S) 8.6-50 MG per tablet 2 Tab  2 Tab Oral BID Glory Mendiola M.D.   Stopped at 08/08/19 1915    And   • polyethylene glycol/lytes (MIRALAX) PACKET 1 Packet  1 Packet Oral QDAY PRN Glory Mendiola M.D.        And   • magnesium hydroxide (MILK OF MAGNESIA) suspension 30 mL  30 mL Oral QDAY PRN Glory Mendiola M.D.        And   • bisacodyl (DULCOLAX) suppository 10 mg  10 mg Rectal QDAY PRN Glory Mendiola M.D.       • NS infusion   Intravenous Continuous Glory Mendiola M.D. 83 mL/hr at 08/08/19 2215     • enalaprilat (VASOTEC) injection 1.25 mg  1.25 mg Intravenous Q6HRS PRN Glory Mendiola M.D.       • ondansetron (ZOFRAN) syringe/vial injection 4 mg  4 mg Intravenous Q4HRS PRN Glory Mendiola M.D.       • ondansetron (ZOFRAN ODT) dispertab 4 mg  4 mg Oral Q4HRS PRN Glory Mendiola M.D.       • aspirin (ASA) tablet 325 mg  325 mg Oral DAILY Glory Mendiola M.D.        Or   • aspirin (ASA) chewable tab 324 mg  324 mg Oral DAILY Glory Mendiola M.D.        Or   • aspirin (ASA) suppository 300 mg  300 mg Rectal DAILY Glory Mendiola M.D.           Allergies  Allergies   Allergen Reactions   • Donepezil    • Pcn [Penicillins]      Hives  Per patient has taken Keflex without any reaction in the past.       Vital Signs last 24 hours  Temp:  [36.7 °C (98 °F)-37.9 °C (100.3 °F)] 37.5 °C (99.5 °F)  Pulse:  [67-94] 88  Resp:  [14-77] 28  BP: (119-186)/() 154/70  SpO2:  [93 %-100 %] 97 %    Physical Exam  Physical Exam   Constitutional: She appears well-developed and well-nourished.   Sleeping, easily to arouse   HENT:   Mouth/Throat:  Oropharynx is clear and moist.   Eyes: Pupils are equal, round, and reactive to light.   Neck: Neck supple.   Cardiovascular: Regular rhythm. Exam reveals no gallop and no friction rub.   No murmur heard.  Pulmonary/Chest: Effort normal. No respiratory distress. She has no wheezes.   Abdominal: Soft. She exhibits no distension. There is no tenderness.   Musculoskeletal: She exhibits no edema.   Neurological:   Sleeping, easy to arouse.  Follows commands but minimally verbal.  Left-sided facial droop remains prominent.  She is unable to oppose gravity with the left upper or lower extremities   Skin: Skin is warm and dry.       Fluids    Intake/Output Summary (Last 24 hours) at 8/9/2019 0427  Last data filed at 8/9/2019 0200  Gross per 24 hour   Intake 311.25 ml   Output 600 ml   Net -288.75 ml       Laboratory  Recent Results (from the past 48 hour(s))   CBC WITH DIFFERENTIAL    Collection Time: 08/07/19 11:43 AM   Result Value Ref Range    WBC 11.1 (H) 4.8 - 10.8 K/uL    RBC 4.32 4.20 - 5.40 M/uL    Hemoglobin 13.4 13.0 - 17.0 g/dL    Hematocrit 40.2 39.0 - 50.0 %    MCV 93.1 81.0 - 99.0 fL    MCH 31.0 27.0 - 31.0 pg    MCHC 33.3 33.0 - 37.0 g/dL    RDW 12.2 11.5 - 14.5 %    Platelet Count 199 130 - 400 K/uL    MPV 9.3 7.4 - 10.4 fL    Neutrophils Automated 79.5 (H) 39.0 - 70.0 %    Lymphocytes Automated 11.0 (L) 21.0 - 50.0 %    Monocytes Automated 8.1 2.0 - 9.0 %    Eosinophils Automated 0.5 0.0 - 5.0 %    Basophils Automated 0.4 0.0 - 3.0 %    Abs Neutrophils Automated 8.8 (H) 1.8 - 7.7 K/uL    Abs Lymph Automated 1.2 1.2 - 4.8 K/uL    Eosinophil Count, Blood 0.06 0.00 - 0.50 K/uL   COMP METABOLIC PANEL    Collection Time: 08/07/19 11:43 AM   Result Value Ref Range    Sodium 135 (L) 136 - 145 mmol/L    Potassium 4.2 3.5 - 5.1 mmol/L    Chloride 100 98 - 107 mmol/L    Co2 26 21 - 32 mmol/L    Anion Gap 13 10 - 18 mmol/L    Glucose 102 (H) 74 - 99 mg/dL    Bun 11 7 - 18 mg/dL    Creatinine 1.0 0.6 - 1.0 mg/dL     Calcium 8.7 8.5 - 11.0 mg/dL    AST(SGOT) 23 15 - 37 U/L    ALT(SGPT) 18 12 - 78 U/L    Alkaline Phosphatase 83 46 - 116 U/L    Total Bilirubin 0.5 0.2 - 1.0 mg/dL    Albumin 3.5 3.4 - 5.0 g/dL    Total Protein 7.9 6.4 - 8.2 g/dL    A-G Ratio 0.8    TROPONIN    Collection Time: 08/07/19 11:43 AM   Result Value Ref Range    Troponin I <0.02 0.00 - 0.06 ng/mL   LIPASE    Collection Time: 08/07/19 11:43 AM   Result Value Ref Range    Lipase 209 73 - 393 U/L   PROTHROMBIN TIME    Collection Time: 08/07/19 11:43 AM   Result Value Ref Range    PT 11.4 9.3 - 11.7 sec    INR 1.08    MAGNESIUM    Collection Time: 08/07/19 11:43 AM   Result Value Ref Range    Magnesium 1.8 1.8 - 2.4 mg/dL   ESTIMATED GFR    Collection Time: 08/07/19 11:43 AM   Result Value Ref Range    GFR If African American >60 >60 mL/min/1.73 m 2    GFR If Non African American 52 (A) >60 mL/min/1.73 m 2   URINALYSIS,CULTURE IF INDICATED    Collection Time: 08/07/19 12:30 PM   Result Value Ref Range    Color YELLOW     Character CLEAR     Specific Gravity 1.010 1.003 - 1.030    Ph 6.5 5.0 - 8.0    Glucose NEGATIVE Negative mg/dL    Ketones NEGATIVE Negative mg/dL    Protein NEGATIVE Negative mg/dL    Bilirubin NEGATIVE Negative    Urobilinogen, Urine 0.2 0.2 - 1.0 mg/dL    Nitrite NEGATIVE Negative    Leukocyte Esterase NEGATIVE Negative    Occult Blood NEGATIVE Negative    Culture Indicated No UA Culture   CBC WITH DIFFERENTIAL    Collection Time: 08/08/19  1:00 PM   Result Value Ref Range    WBC 12.9 (H) 4.8 - 10.8 K/uL    RBC 4.46 4.20 - 5.40 M/uL    Hemoglobin 13.8 13.0 - 17.0 g/dL    Hematocrit 41.1 39.0 - 50.0 %    MCV 92.2 81.0 - 99.0 fL    MCH 30.9 27.0 - 31.0 pg    MCHC 33.6 33.0 - 37.0 g/dL    RDW 12.2 11.5 - 14.5 %    Platelet Count 202 130 - 400 K/uL    MPV 9.4 7.4 - 10.4 fL    Neutrophils Automated 88.1 (H) 39.0 - 70.0 %    Lymphocytes Automated 6.2 (L) 21.0 - 50.0 %    Monocytes Automated 4.9 2.0 - 9.0 %    Eosinophils Automated 0.0 0.0 - 5.0  %    Basophils Automated 0.2 0.0 - 3.0 %    Abs Neutrophils Automated 11.4 (H) 1.8 - 7.7 K/uL    Abs Lymph Automated 0.8 (L) 1.2 - 4.8 K/uL    Eosinophil Count, Blood 0.00 0.00 - 0.50 K/uL   COMP METABOLIC PANEL    Collection Time: 08/08/19  1:00 PM   Result Value Ref Range    Sodium 135 (L) 136 - 145 mmol/L    Potassium 4.4 3.5 - 5.1 mmol/L    Chloride 98 98 - 107 mmol/L    Co2 21 21 - 32 mmol/L    Anion Gap 20 (H) 10 - 18 mmol/L    Glucose 122 (H) 74 - 99 mg/dL    Bun 13 7 - 18 mg/dL    Creatinine 1.0 0.6 - 1.0 mg/dL    Calcium 8.6 8.5 - 11.0 mg/dL    AST(SGOT) 28 15 - 37 U/L    ALT(SGPT) 19 12 - 78 U/L    Alkaline Phosphatase 82 46 - 116 U/L    Total Bilirubin 0.7 0.2 - 1.0 mg/dL    Albumin 3.6 3.4 - 5.0 g/dL    Total Protein 8.1 6.4 - 8.2 g/dL    A-G Ratio 0.8    TROPONIN    Collection Time: 08/08/19  1:00 PM   Result Value Ref Range    Troponin I 0.04 0.00 - 0.06 ng/mL   BTYPE NATRIURETIC PEPTIDE    Collection Time: 08/08/19  1:00 PM   Result Value Ref Range    B Natriuretic Peptide 223 (H) 5 - 179 pg/mL   LACTIC ACID    Collection Time: 08/08/19  1:00 PM   Result Value Ref Range    Lactic Acid 1.4 0.4 - 2.0 mmol/L   URINALYSIS,CULTURE IF INDICATED    Collection Time: 08/08/19  1:00 PM   Result Value Ref Range    Color YELLOW     Character CLEAR     Specific Gravity 1.025 1.003 - 1.030    Ph 5.5 5.0 - 8.0    Glucose NEGATIVE Negative mg/dL    Ketones >=80 (A) Negative mg/dL    Protein TRACE (A) Negative mg/dL    Bilirubin NEGATIVE Negative    Urobilinogen, Urine 0.2 0.2 - 1.0 mg/dL    Nitrite POSITIVE (A) Negative    Leukocyte Esterase NEGATIVE Negative    Occult Blood MODERATE (A) Negative    Culture Indicated Yes UA Culture   ESTIMATED GFR    Collection Time: 08/08/19  1:00 PM   Result Value Ref Range    GFR If African American >60 >60 mL/min/1.73 m 2    GFR If Non African American 52 (A) >60 mL/min/1.73 m 2   URINE MICROSCOPIC (W/UA)    Collection Time: 08/08/19  1:00 PM   Result Value Ref Range    WBC 6-10  (A) 0 - 6 /hpf    RBC 0-2 0 - 3 /hpf    Bacteria 3+ (A) None /hpf    Epithelial Cells None Seen None /hpf   EKG    Collection Time: 19  2:40 PM   Result Value Ref Range    Report       [facility] Emergency Dept.    Test Date:  2019  Pt Name:    CLAUDIO ESPINOZA              Department: ER  MRN:        [id number]                      Room:       [facility]  Gender:     Female                       Technician: [id number]  :        [id number]                   Requested By:ER TRIAGE PROTOCOL  Order #:    [id number]                    Reading MD: DAYA VELARDE MD    Measurements  Intervals                                Axis  Rate:       72                           P:          62  MA:         156                          QRS:        7  QRSD:       70                           T:          37  QT:         396  QTc:        434    Interpretive Statements  SINUS RHYTHM  No previous ECG available for comparison    Electronically Signed On 2019 16:35:46 PDT by DAYA EVLARDE MD     CBC WITH DIFFERENTIAL    Collection Time: 19  3:10 PM   Result Value Ref Range    WBC 11.4 (H) 4.8 - 10.8 K/uL    RBC 4.37 4.20 - 5.40 M/uL    Hemoglobin 13.7 12.0 - 16.0 g/dL    Hematocrit 41.0 37.0 - 47.0 %    MCV 93.8 81.4 - 97.8 fL    MCH 31.4 27.0 - 33.0 pg    MCHC 33.4 (L) 33.6 - 35.0 g/dL    RDW 42.2 35.9 - 50.0 fL    Platelet Count 182 164 - 446 K/uL    MPV 9.5 9.0 - 12.9 fL    Neutrophils-Polys 84.80 (H) 44.00 - 72.00 %    Lymphocytes 10.10 (L) 22.00 - 41.00 %    Monocytes 4.60 0.00 - 13.40 %    Eosinophils 0.00 0.00 - 6.90 %    Basophils 0.10 0.00 - 1.80 %    Immature Granulocytes 0.40 0.00 - 0.90 %    Nucleated RBC 0.00 /100 WBC    Neutrophils (Absolute) 9.69 (H) 2.00 - 7.15 K/uL    Lymphs (Absolute) 1.15 1.00 - 4.80 K/uL    Monos (Absolute) 0.52 0.00 - 0.85 K/uL    Eos (Absolute) 0.00 0.00 - 0.51 K/uL    Baso (Absolute) 0.01 0.00 - 0.12 K/uL    Immature Granulocytes (abs) 0.05 0.00 - 0.11 K/uL    NRBC  (Absolute) 0.00 K/uL   COMP METABOLIC PANEL    Collection Time: 08/08/19  3:11 PM   Result Value Ref Range    Sodium 133 (L) 135 - 145 mmol/L    Potassium 4.4 3.6 - 5.5 mmol/L    Chloride 99 96 - 112 mmol/L    Co2 22 20 - 33 mmol/L    Anion Gap 12.0 (H) 0.0 - 11.9    Glucose 112 (H) 65 - 99 mg/dL    Bun 12 8 - 22 mg/dL    Creatinine 0.83 0.50 - 1.40 mg/dL    Calcium 8.8 8.5 - 10.5 mg/dL    AST(SGOT) 27 12 - 45 U/L    ALT(SGPT) 16 2 - 50 U/L    Alkaline Phosphatase 68 30 - 99 U/L    Total Bilirubin 0.6 0.1 - 1.5 mg/dL    Albumin 3.9 3.2 - 4.9 g/dL    Total Protein 7.6 6.0 - 8.2 g/dL    Globulin 3.7 (H) 1.9 - 3.5 g/dL    A-G Ratio 1.1 g/dL   TROPONIN    Collection Time: 08/08/19  3:11 PM   Result Value Ref Range    Troponin T 25 (H) 6 - 19 ng/L   PROTHROMBIN TIME    Collection Time: 08/08/19  3:11 PM   Result Value Ref Range    PT 14.6 12.0 - 14.6 sec    INR 1.11 0.87 - 1.13   ESTIMATED GFR    Collection Time: 08/08/19  3:11 PM   Result Value Ref Range    GFR If African American >60 >60 mL/min/1.73 m 2    GFR If Non African American >60 >60 mL/min/1.73 m 2   CBC WITH DIFFERENTIAL    Collection Time: 08/09/19  2:35 AM   Result Value Ref Range    WBC 9.7 4.8 - 10.8 K/uL    RBC 2.82 (L) 4.20 - 5.40 M/uL    Hemoglobin 8.5 (L) 12.0 - 16.0 g/dL    Hematocrit 27.3 (L) 37.0 - 47.0 %    MCV 96.8 81.4 - 97.8 fL    MCH 30.1 27.0 - 33.0 pg    MCHC 31.1 (L) 33.6 - 35.0 g/dL    RDW 43.9 35.9 - 50.0 fL    Platelet Count 128 (L) 164 - 446 K/uL    MPV 9.5 9.0 - 12.9 fL    Neutrophils-Polys 75.20 (H) 44.00 - 72.00 %    Lymphocytes 14.60 (L) 22.00 - 41.00 %    Monocytes 9.60 0.00 - 13.40 %    Eosinophils 0.00 0.00 - 6.90 %    Basophils 0.20 0.00 - 1.80 %    Immature Granulocytes 0.40 0.00 - 0.90 %    Nucleated RBC 0.00 /100 WBC    Neutrophils (Absolute) 7.32 (H) 2.00 - 7.15 K/uL    Lymphs (Absolute) 1.42 1.00 - 4.80 K/uL    Monos (Absolute) 0.93 (H) 0.00 - 0.85 K/uL    Eos (Absolute) 0.00 0.00 - 0.51 K/uL    Baso (Absolute) 0.02 0.00 -  0.12 K/uL    Immature Granulocytes (abs) 0.04 0.00 - 0.11 K/uL    NRBC (Absolute) 0.00 K/uL   Basic Metabolic Panel    Collection Time: 08/09/19  3:00 AM   Result Value Ref Range    Sodium 137 135 - 145 mmol/L    Potassium 4.2 3.6 - 5.5 mmol/L    Chloride 105 96 - 112 mmol/L    Co2 20 20 - 33 mmol/L    Glucose 96 65 - 99 mg/dL    Bun 10 8 - 22 mg/dL    Creatinine 0.69 0.50 - 1.40 mg/dL    Calcium 7.4 (L) 8.5 - 10.5 mg/dL    Anion Gap 12.0 (H) 0.0 - 11.9   Lipid Profile    Collection Time: 08/09/19  3:00 AM   Result Value Ref Range    Cholesterol,Tot 103 100 - 199 mg/dL    Triglycerides 75 0 - 149 mg/dL    HDL 35 (A) >=40 mg/dL    LDL 53 <100 mg/dL   ESTIMATED GFR    Collection Time: 08/09/19  3:00 AM   Result Value Ref Range    GFR If African American >60 >60 mL/min/1.73 m 2    GFR If Non African American >60 >60 mL/min/1.73 m 2       Imaging  DX-CHEST-PORTABLE (1 VIEW)   Final Result      1.  No acute cardiopulmonary disease.   2.  Contrast present within mildly dilated LEFT renal collecting system.      IR-THROMBO MECHANICAL ARTERY,INIT   Final Result      1.  Occlusion of the right middle cerebral artery, M2 segment.      2.  Successful cerebral thrombectomy performed with post intervention angiogram demonstrating patent right middle cerebral artery vessels.      Findings were discussed with Dr. Miramontes at approximately 1755 hrs.      OUTSIDE IMAGES-CT HEAD   Final Result      OUTSIDE IMAGES-CT HEAD   Final Result      CT-CTA NECK WITH & W/O-POST PROCESSING   Final Result      Moderate calcified plaque right carotid artery bifurcation. No significant stenosis.   Left carotid arteries appear patent.   Vertebral arteries appear patent.   Focal enlargement of right lingual tonsils measuring 12.6 mm in diameter. Patient's condition permits, would consider further evaluation.      CT-CEREBRAL PERFUSION ANALYSIS   Final Result      1.  Cerebral blood flow less than 30% likely representing completed infarct = 38 mL.       2.  T Max more than 6 seconds likely representing combination of completed infarct and ischemia = 98 mL.      3.  Mismatched volume likely representing ischemic brain/penumbra = 60 mL      4.  Please note that the cerebral perfusion was performed on the limited brain tissue around the basal ganglia region. Infarct/ischemia outside the CT perfusion sections can be missed in this study.      CT-CTA HEAD WITH & W/O-POST PROCESS    (Results Pending)   EC-ECHOCARDIOGRAM COMPLETE W/ CONT    (Results Pending)       Assessment/Plan  * CVA (cerebral vascular accident) (HCC)- (present on admission)  Assessment & Plan  Right MCA CVA, status post thrombectomy without TPA  - Allow permissive hypertension up to SBP 160mmHg  - Avoid hypotension below 100/50.  Use IV 0.9% normal saline bolus if necessary.   - Neurochecks per protocol.  - MRI brain w/o contrast ordered to be done 24 hr from tPA. If it is delayed for >6 hrs, instructed the RN to obtain CT head w/o contrast instead.  - Echocardiogram with bubble study  - Initiate daily aspirin 24 hours after thrombolytic therapy complete  - High-dose statin therapy  - Maintain euglycemia <180mg/dL  - Consult PT/OT/Speech/Nutrition      Discussed patient condition and risk of morbidity and/or mortality with Hospitalist, RN and Patient.      The patient remains critically ill with very real possibilty of a deterioration of this patient's condition required the highest level of my preparedness for sudden, emergent intervention.  I provided critical care services, which included strict blood pressure control frequent neurologic exams.     Critical care time 40 minutesin directly providing and coordinating critical care and extensive data review.  No time overlap and excludes procedures.

## 2019-08-09 NOTE — ASSESSMENT & PLAN NOTE
Right MCA s/p thrombectomy  No TPA as outside window  SBP goal < 160 since hemorrhagic conversion on imaging  neurochecks q 2 hour, progress to q 4 hour end of day with stability  Discussed with family  Significant stroke, poor prognosis given current state of health  High aspiration risk

## 2019-08-09 NOTE — PROGRESS NOTES
1315-Pt transferred to Mri by Rn without incident.     Pt increasingly drowsy, lethargic on 1300,1400, 1500 neuro assessments. Mri result not back. Rn notified Dr. Mahendra Allen of neuro assessment findings. Rn notified that physician had been contacted of MRI result. Pt developed hemorrhagic transformation.

## 2019-08-09 NOTE — DISCHARGE PLANNING
PMR referral from Dr. Mendiola     R MCA occlusion, patient is s/p thrombectomy. PMHx significant for dementia, Dependent for mobility and self care, pre feeding trials with SLP. Anticipate skilled nursing when medically cleared for discharge. Current documentation does not support tolerance and participation to a rehab level of care. No physiatry consult ordered per protocol.

## 2019-08-09 NOTE — THERAPY
"Physical Therapy Evaluation completed.   Bed Mobility:  Supine to Sit: Total Assist  Transfers: Sit to Stand: Maximal Assist  Gait: Level Of Assist: Unable to Participate       Plan of Care: Will benefit from Physical Therapy 4 times per week  Discharge Recommendations: Equipment: Will Continue to Assess for Equipment Needs. Post-acute therapy: see below.    See \"Rehab Therapy-Acute\" Patient Summary Report for complete documentation.    Patient is an 87 YO female that was admitted 8/8 following GLF, AMS, dysarthria, R sided gaze with L neglect and L sided weakness. Imaging revealed R MCA occlusion, patient is s/p thrombectomy. PMHx significant for dementia, CAD, osteoporosis, frequent falls. She presented to PT with impaired motor control, impaired balance and coordination, impaired sensation, functional weakness, and decreased activity tolerance which are limiting her ability to safely perform mobility. Upon entry, patient with eyes closed and unable to open with verbal and tactile cueing; improved arousal once sitting EOB. She presented with R cervical rotation and L inattention. She required total A for supine>sitting and with L lateral and posterior lean in sitting, somewhat improved during session. Max A for sit<>stand with facilitation of extension. Recommend post acute placement prior to return home, need to confirm social and PLOF as patient unable to provide details. Will continue to follow.  "

## 2019-08-09 NOTE — PROGRESS NOTES
Flakita from Lab called with critical result of hgb 8.4 ( decreased) at 0335. Critical lab result read back to Flakita.   Dr. Camejo notified of critical lab result at 0345.  Critical lab result read back by Dr. Camejo.

## 2019-08-09 NOTE — THERAPY
"Occupational Therapy Evaluation completed.   Functional Status:  Max/dependent with ADLs and txfs  Plan of Care: Will benefit from Occupational Therapy 3 times per week  Discharge Recommendations:  Equipment: Will Continue to Assess for Equipment Needs. Post-acute therapy Discharge to a transitional care facility for continued therapy services.    See \"Rehab Therapy-Acute\" Patient Summary Report for complete documentation.    "

## 2019-08-09 NOTE — CARE PLAN
Problem: Communication  Goal: The ability to communicate needs accurately and effectively will improve  Outcome: PROGRESSING AS EXPECTED     Problem: Safety  Goal: Will remain free from injury  Outcome: PROGRESSING AS EXPECTED     Problem: Safety  Goal: Will remain free from falls  Outcome: PROGRESSING AS EXPECTED

## 2019-08-09 NOTE — THERAPY
Physical Therapy Contact Note    PT consult received and acknowledged. Per chart review patient with active bed rest orders following thrombectomy. Will monitor for appropriateness and re attempt PT eval as able.    Valery Rivero, PT, DPT  [phone]

## 2019-08-09 NOTE — PROGRESS NOTES
Critical Care Progress Note    Date of admission  8/8/2019    Chief Complaint  88 y.o. female who presented 8/8/2019 with recent fall that was evaluated in the emergency department at [facility].  At the time she was neurologically intact and a head CT was unremarkable.  She was subsequently discharged.  Apparently there was some notation that she had a right-sided gaze preference before she went to sleep. This morning she was found by her son with altered mental status, dysarthria left-sided facial droop and flaccid left upper and lower extremities.  She was taken back to the emergency department.  A repeat head CT revealed no acute changes.  She was subsequently transferred to [facility] for further evaluation.  Last known well 2200 on 8/7/2019.  Neurologic changes noted at 1130 on the morning of 8/8/2019.  Neurology was consulted.  NIH score-14.  CT perfusion revealed a penumbra of 60 mL's.  CT angio  revealed a right MCA large vessel occlusion.  She was taken to IR for thrombectomy.    Hospital Course          Interval Problem Update  Reviewed last 24 hour events:  Post thrombectomy, out of TPA window  Lethargic  Unclear baseline  Oriented times 4  Perrla  Severe left sided deficits  Left facial droop  Withdraws upper and lower  Hemodynamically sinus  Blood pressure 120 to 150s  Keep sbp < 160  Low grade temp  DNR but intubation ok  MRI pending  K 4.2  Hemoglobin to 8.5  Repeat cbc  High dose statin, on lower dose    Addendum: hemorrhagic conversion  Stopped asa  No ac  Discussed with son at bedside  DNAR ok for intubation  Ceftriaxone for uti, cx gram neg rods at [place], 3 days therapy    Review of Systems  Review of Systems   Unable to perform ROS: Acuity of condition        Vital Signs for last 24 hours   Temp:  [36.7 °C (98 °F)-37.9 °C (100.3 °F)] 37.4 °C (99.4 °F)  Pulse:  [67-94] 87  Resp:  [14-77] 43  BP: (119-186)/() 137/65  SpO2:  [92 %-100 %] 92 %    Hemodynamic parameters  for last 24 hours       Respiratory Information for the last 24 hours       Physical Exam   Physical Exam   Constitutional: She appears distressed.   fatigued   HENT:   Head: Normocephalic and atraumatic.   Right Ear: External ear normal.   Left Ear: External ear normal.   Mouth/Throat: No oropharyngeal exudate.   Eyes: Pupils are equal, round, and reactive to light. Conjunctivae and EOM are normal. Right eye exhibits no discharge. Left eye exhibits no discharge.   Neck: No JVD present. No tracheal deviation present.   Cardiovascular: Normal rate, regular rhythm and normal heart sounds.   No murmur heard.  Pulmonary/Chest: Effort normal and breath sounds normal. No stridor. No respiratory distress. She has no wheezes. She has no rales. She exhibits no tenderness.   Abdominal: She exhibits no mass. There is no tenderness. There is no rebound and no guarding.   Musculoskeletal: She exhibits no edema, tenderness or deformity.   Neurological: She is alert. She displays normal reflexes. A cranial nerve deficit is present. Coordination normal.   Skin: Skin is warm and dry. No rash noted. She is not diaphoretic. No erythema.   Nursing note and vitals reviewed.      Medications  Current Facility-Administered Medications   Medication Dose Route Frequency Provider Last Rate Last Dose   • levothyroxine (SYNTHROID) tablet 25 mcg  25 mcg Oral AM ES Glory Mendiola M.D.   Stopped at 08/09/19 0600   • donepezil (ARICEPT) tablet 10 mg  10 mg Oral Nightly Glory Mendiola M.D.   Stopped at 08/08/19 2100   • atorvastatin (LIPITOR) tablet 20 mg  20 mg Oral Nightly Glory Mendiola M.D.   Stopped at 08/08/19 2100   • acetaminophen (TYLENOL) tablet 650 mg  650 mg Oral Q6HRS PRN Glory Mendiola M.D.       • senna-docusate (PERICOLACE or SENOKOT S) 8.6-50 MG per tablet 2 Tab  2 Tab Oral BID Glory Mendiola M.D.   Stopped at 08/08/19 1915    And   • polyethylene glycol/lytes (MIRALAX) PACKET 1 Packet  1 Packet Oral QDAY PRN Glory Mendiola M.D.        And   • magnesium  hydroxide (MILK OF MAGNESIA) suspension 30 mL  30 mL Oral QDAY PRN Glory Mendiola M.D.        And   • bisacodyl (DULCOLAX) suppository 10 mg  10 mg Rectal QDAY PRN Glory Mendiola M.D.       • NS infusion   Intravenous Continuous Glory Mendiola M.D. 83 mL/hr at 08/08/19 2215     • enalaprilat (VASOTEC) injection 1.25 mg  1.25 mg Intravenous Q6HRS PRN Glory Mendiola M.D.       • ondansetron (ZOFRAN) syringe/vial injection 4 mg  4 mg Intravenous Q4HRS PRN Glory Mendiola M.D.       • ondansetron (ZOFRAN ODT) dispertab 4 mg  4 mg Oral Q4HRS PRN Glory Mendiola M.D.       • aspirin (ASA) tablet 325 mg  325 mg Oral DAILY Glory Mendiola M.D.        Or   • aspirin (ASA) chewable tab 324 mg  324 mg Oral DAILY Glory Mendiola M.D.        Or   • aspirin (ASA) suppository 300 mg  300 mg Rectal DAILY Glory Mendiola M.D.           Fluids    Intake/Output Summary (Last 24 hours) at 8/9/2019 0714  Last data filed at 8/9/2019 0600  Gross per 24 hour   Intake 643.25 ml   Output 710 ml   Net -66.75 ml       Laboratory      Recent Labs     08/07/19  1143 08/08/19  1300   TROPONINI <0.02 0.04   BNPBTYPENAT  --  223*     Recent Labs     08/07/19  1143 08/08/19  1300 08/08/19  1511 08/09/19  0300   SODIUM 135* 135* 133* 137   POTASSIUM 4.2 4.4 4.4 4.2   CHLORIDE 100 98 99 105   CO2 26 21 22 20   BUN 11 13 12 10   CREATININE 1.0 1.0 0.83 0.69   MAGNESIUM 1.8  --   --   --    CALCIUM 8.7 8.6 8.8 7.4*     Recent Labs     08/07/19  1143 08/08/19  1300 08/08/19  1511 08/09/19  0300   ALTSGPT 18 19 16  --    ASTSGOT 23 28 27  --    ALKPHOSPHAT 83 82 68  --    TBILIRUBIN 0.5 0.7 0.6  --    LIPASE 209  --   --   --    GLUCOSE 102* 122* 112* 96     Recent Labs     08/07/19  1143 08/08/19  1300 08/08/19  1510 08/08/19  1511 08/09/19  0235   WBC 11.1* 12.9* 11.4*  --  9.7   NEUTSPOLYS  --   --  84.80*  --  75.20*   LYMPHOCYTES  --   --  10.10*  --  14.60*   MONOCYTES  --   --  4.60  --  9.60   EOSINOPHILS  --   --  0.00  --  0.00   BASOPHILS  --   --  0.10  --  0.20   ASTSGOT 23 28  --  27  --     ALTSGPT 18 19  --  16  --    ALKPHOSPHAT 83 82  --  68  --    TBILIRUBIN 0.5 0.7  --  0.6  --      Recent Labs     08/07/19  1143 08/08/19  1300 08/08/19  1510 08/08/19  1511 08/09/19  0235   RBC 4.32 4.46 4.37  --  2.82*   HEMOGLOBIN 13.4 13.8 13.7  --  8.5*   HEMATOCRIT 40.2 41.1 41.0  --  27.3*   PLATELETCT 199 202 182  --  128*   PROTHROMBTM 11.4  --   --  14.6  --    INR 1.08  --   --  1.11  --        Imaging  X-Ray:  I have personally reviewed the images and compared with prior images.  EKG:  I have personally reviewed the images and compared with prior images.  CT:    Reviewed  MRI:   Reviewed    Assessment/Plan  * CVA (cerebral vascular accident) (HCC)- (present on admission)  Assessment & Plan  Right MCA s/p thrombectomy  No TPA as outside window  SBP goal < 160 since hemorrhagic conversion on imaging  neurochecks q 1 hour  Discussed with family      Acute cystitis without hematuria  Assessment & Plan  Ceftriaxone 3 days  Gram neg rods joyce labs    CAD (coronary artery disease)- (present on admission)  Assessment & Plan  No acute issue currently    Falls frequently- (present on admission)  Assessment & Plan  Frequent falls    Dementia- (present on admission)  Assessment & Plan  Baseline  Discussed with son       VTE:  Contraindicated  Ulcer: Not Indicated  Lines: None    I have performed a physical exam and reviewed and updated ROS and Plan today (8/9/2019). In review of yesterday's note (8/8/2019), there are no changes except as documented above.     Discussed patient condition and risk of morbidity and/or mortality with Hospitalist, Family, RN, RT, Pharmacy, Code status disscussed, Charge nurse / hot rounds, Patient and neurology     The patient remains critically ill.  She is post thrombectomy.  Close neurological monitoring with q 1 hour neuro checks. She had hemorrhagic conversion of brain imaging.  High risk for clinical deterioration including intracranial bleeding, neurological dysfunction of  which would lead to possible cardiopulmonary arrest, mechanical ventilation or even death without critical care monitoring and management.  Critical care time = 34 minutes in directly providing and coordinating critical care and extensive data review.  No time overlap and excludes procedures.

## 2019-08-09 NOTE — THERAPY
"  Speech Language Therapy Clinical Swallow Evaluation completed.  Functional Status: Patient was seen for a clinical swallow evaluation this date. Accompanied by RN, patient was easily aroused, however significant L side neglect noted. Oral care completed, patient able to follow some OME directives, however noted L side facial droop, decreased ROM and overall generalized weakness with decreased sensation on L side. Patient with wet gurgly voice prior to PO trials. Limited PO trials of NTL via tsp x3 were consumed. Patient demonstrated oral holding, tongue pumping, decreased and delayed onset of swallow with weakness during laryngeal elevation and hyolaryngeal excursion noted upon palpation. Patient demonstrated delayed throat clearing, coughing and increased in wet/gurgly voice s/p trials of NTL. Patient is at high risk for aspiration and at this time recommend STRICT NPO with alternative source of hydration/nutrition via Cortrak. SLP to follow for pre feeding trials. Patient would benefit from a FEES when deemed appropriate to further assess oropharyngeal swallow function and r/o silent aspiration. SLP to follow. RN aware.   Recommendations - Diet: Diet / Liquid Recommendation: Pre-Feeding Trials with SLP Only, NPO                          Strategies: to be assessed                          Medication Administration: Medication Administration : Via Gastric Tube  Plan of Care: Will benefit from Speech Therapy 5 times per week  Post-Acute Therapy: Discharge to a transitional care facility for continued skilled therapy services.    See \"Rehab Therapy-Acute\" Patient Summary Report for complete documentation.   "

## 2019-08-09 NOTE — ASSESSMENT & PLAN NOTE
-difficult to ascertain her baseline  -continue Aricept  -frequent reorientation  -Fall precautions  -Plan to DC to home or facility on hospice

## 2019-08-09 NOTE — DIETARY
Nutrition Services: Nutrition Support Assessment  Day 1 of admit.  Dominiuqe King is a 88 y.o. female with admitting DX of CVA (cerebral vascular accident)     Current problem list:  1. CVA  2. Dementia  3. Falls frequently  4. CAD  5. Leukocytosis  6. Hyponatremia     Assessment:  Estimated Nutritional Needs: based on: Ht: 152.4 cm, Wt 8/8: 57.5 kg via bed scale, IBW: 45.4 kg, BMI: 24.76 - Normal     Calculation/Equation: REE per MSJ x 1.2 = 1113 kcal/day  Total Calories / day: 1100 - 1400 (Calories / k - 24)  Total Grams Protein / day: 69 - 86 (Grams Protein / k.2 - 1.5)  Total Fluids ml / day: 1440 ml    Evaluation:   1. Consult received for TF assessment. Pt failed SLP evaluation  2. Enteral access: Cortrak to be placed.   3. Pt appears adequately nourished.    4. Meds: calcium chloride in 1 g in D5W 100 mL IVPB, aricept, synthroid, pericolace  5. Fluids: NS infusion @ 83 mL/hr  6. Skin: Per flowsheets - pressure injury buttocks. No wound team consult pending at this time.   7. Replete with fiber appropriate to meet pt's estimated protein needs.      Malnutrition Risk: No criteria noted at this time.      Recommendations/Plan:  Start Replete with Fiber @ 25 ml/hr and advance per protocol to 50 ml/hr (goal rate) to provide 1200 kcal (21 kcal/kg), 77 grams protein (1.3 gm/kg), and 996 ml free water per day.   Fluids per MD.   Diet upgrades per SLP       RD following

## 2019-08-09 NOTE — OR SURGEON
Immediate Post- Operative Note        PostOp Diagnosis: R MCA occlusion.       Procedure(s): Cerebral angiogram with thrombectomy.       Estimated Blood Loss: Less than 50 ml        Complications: None            8/8/2019     5:50 PM     Omar Munoz

## 2019-08-09 NOTE — CARE PLAN
Problem: Safety  Goal: Will remain free from injury  Outcome: PROGRESSING AS EXPECTED  Goal: Will remain free from falls  Outcome: PROGRESSING AS EXPECTED     Problem: Communication  Goal: The ability to communicate needs accurately and effectively will improve  Outcome: PROGRESSING SLOWER THAN EXPECTED

## 2019-08-10 ENCOUNTER — APPOINTMENT (OUTPATIENT)
Dept: RADIOLOGY | Facility: MEDICAL CENTER | Age: 84
DRG: 023 | End: 2019-08-10
Attending: HOSPITALIST
Payer: MEDICARE

## 2019-08-10 ENCOUNTER — APPOINTMENT (OUTPATIENT)
Dept: CARDIOLOGY | Facility: MEDICAL CENTER | Age: 84
DRG: 023 | End: 2019-08-10
Attending: INTERNAL MEDICINE
Payer: MEDICARE

## 2019-08-10 ENCOUNTER — APPOINTMENT (OUTPATIENT)
Dept: RADIOLOGY | Facility: MEDICAL CENTER | Age: 84
DRG: 023 | End: 2019-08-10
Attending: INTERNAL MEDICINE
Payer: MEDICARE

## 2019-08-10 ENCOUNTER — APPOINTMENT (OUTPATIENT)
Dept: RADIOLOGY | Facility: MEDICAL CENTER | Age: 84
DRG: 023 | End: 2019-08-10
Payer: MEDICARE

## 2019-08-10 PROBLEM — E87.8 ELECTROLYTE ABNORMALITY: Status: ACTIVE | Noted: 2019-08-10

## 2019-08-10 LAB
ANION GAP SERPL CALC-SCNC: 9 MMOL/L (ref 0–11.9)
BASOPHILS # BLD AUTO: 0.2 % (ref 0–1.8)
BASOPHILS # BLD: 0.02 K/UL (ref 0–0.12)
BUN SERPL-MCNC: 11 MG/DL (ref 8–22)
CALCIUM SERPL-MCNC: 7 MG/DL (ref 8.5–10.5)
CHLORIDE SERPL-SCNC: 113 MMOL/L (ref 96–112)
CO2 SERPL-SCNC: 19 MMOL/L (ref 20–33)
CREAT SERPL-MCNC: 0.58 MG/DL (ref 0.5–1.4)
CRP SERPL HS-MCNC: 1.5 MG/DL (ref 0–0.75)
EOSINOPHIL # BLD AUTO: 0.03 K/UL (ref 0–0.51)
EOSINOPHIL NFR BLD: 0.3 % (ref 0–6.9)
ERYTHROCYTE [DISTWIDTH] IN BLOOD BY AUTOMATED COUNT: 43.5 FL (ref 35.9–50)
GLUCOSE SERPL-MCNC: 86 MG/DL (ref 65–99)
HCT VFR BLD AUTO: 28.9 % (ref 37–47)
HGB BLD-MCNC: 9.4 G/DL (ref 12–16)
IMM GRANULOCYTES # BLD AUTO: 0.09 K/UL (ref 0–0.11)
IMM GRANULOCYTES NFR BLD AUTO: 0.8 % (ref 0–0.9)
LV EJECT FRACT  99904: 65
LYMPHOCYTES # BLD AUTO: 2.22 K/UL (ref 1–4.8)
LYMPHOCYTES NFR BLD: 19.9 % (ref 22–41)
MAGNESIUM SERPL-MCNC: 1.7 MG/DL (ref 1.5–2.5)
MCH RBC QN AUTO: 31.2 PG (ref 27–33)
MCHC RBC AUTO-ENTMCNC: 32.5 G/DL (ref 33.6–35)
MCV RBC AUTO: 96 FL (ref 81.4–97.8)
MONOCYTES # BLD AUTO: 1.03 K/UL (ref 0–0.85)
MONOCYTES NFR BLD AUTO: 9.2 % (ref 0–13.4)
NEUTROPHILS # BLD AUTO: 7.79 K/UL (ref 2–7.15)
NEUTROPHILS NFR BLD: 69.6 % (ref 44–72)
NRBC # BLD AUTO: 0 K/UL
NRBC BLD-RTO: 0 /100 WBC
PLATELET # BLD AUTO: 140 K/UL (ref 164–446)
PMV BLD AUTO: 9.6 FL (ref 9–12.9)
POTASSIUM SERPL-SCNC: 3.3 MMOL/L (ref 3.6–5.5)
PREALB SERPL-MCNC: 7 MG/DL (ref 18–38)
RBC # BLD AUTO: 3.01 M/UL (ref 4.2–5.4)
SODIUM SERPL-SCNC: 141 MMOL/L (ref 135–145)
WBC # BLD AUTO: 11.2 K/UL (ref 4.8–10.8)

## 2019-08-10 PROCEDURE — 700102 HCHG RX REV CODE 250 W/ 637 OVERRIDE(OP): Performed by: HOSPITALIST

## 2019-08-10 PROCEDURE — 74018 RADEX ABDOMEN 1 VIEW: CPT

## 2019-08-10 PROCEDURE — 93306 TTE W/DOPPLER COMPLETE: CPT | Mod: 26 | Performed by: INTERNAL MEDICINE

## 2019-08-10 PROCEDURE — 83735 ASSAY OF MAGNESIUM: CPT

## 2019-08-10 PROCEDURE — L4398 FOOT DROP SPLINT PRE OTS: HCPCS

## 2019-08-10 PROCEDURE — A9270 NON-COVERED ITEM OR SERVICE: HCPCS | Performed by: HOSPITALIST

## 2019-08-10 PROCEDURE — 700111 HCHG RX REV CODE 636 W/ 250 OVERRIDE (IP): Performed by: INTERNAL MEDICINE

## 2019-08-10 PROCEDURE — 80048 BASIC METABOLIC PNL TOTAL CA: CPT

## 2019-08-10 PROCEDURE — 85025 COMPLETE CBC W/AUTO DIFF WBC: CPT

## 2019-08-10 PROCEDURE — 86140 C-REACTIVE PROTEIN: CPT

## 2019-08-10 PROCEDURE — 700105 HCHG RX REV CODE 258: Performed by: INTERNAL MEDICINE

## 2019-08-10 PROCEDURE — 700102 HCHG RX REV CODE 250 W/ 637 OVERRIDE(OP): Performed by: INTERNAL MEDICINE

## 2019-08-10 PROCEDURE — 99291 CRITICAL CARE FIRST HOUR: CPT | Performed by: INTERNAL MEDICINE

## 2019-08-10 PROCEDURE — 84134 ASSAY OF PREALBUMIN: CPT

## 2019-08-10 PROCEDURE — 70450 CT HEAD/BRAIN W/O DYE: CPT

## 2019-08-10 PROCEDURE — 99233 SBSQ HOSP IP/OBS HIGH 50: CPT | Performed by: HOSPITALIST

## 2019-08-10 PROCEDURE — 770020 HCHG ROOM/CARE - TELE (206)

## 2019-08-10 PROCEDURE — 770006 HCHG ROOM/CARE - MED/SURG/GYN SEMI*

## 2019-08-10 PROCEDURE — A9270 NON-COVERED ITEM OR SERVICE: HCPCS | Performed by: INTERNAL MEDICINE

## 2019-08-10 PROCEDURE — 99232 SBSQ HOSP IP/OBS MODERATE 35: CPT

## 2019-08-10 PROCEDURE — 93306 TTE W/DOPPLER COMPLETE: CPT

## 2019-08-10 RX ORDER — ASPIRIN 81 MG/1
81 TABLET, CHEWABLE ORAL DAILY
Status: DISCONTINUED | OUTPATIENT
Start: 2019-08-11 | End: 2019-08-11

## 2019-08-10 RX ORDER — ASPIRIN 325 MG
325 TABLET ORAL DAILY
Status: DISCONTINUED | OUTPATIENT
Start: 2019-08-10 | End: 2019-08-10

## 2019-08-10 RX ORDER — POTASSIUM CHLORIDE 20 MEQ/1
40 TABLET, EXTENDED RELEASE ORAL ONCE
Status: DISCONTINUED | OUTPATIENT
Start: 2019-08-10 | End: 2019-08-10

## 2019-08-10 RX ORDER — MAGNESIUM SULFATE HEPTAHYDRATE 40 MG/ML
2 INJECTION, SOLUTION INTRAVENOUS ONCE
Status: COMPLETED | OUTPATIENT
Start: 2019-08-10 | End: 2019-08-10

## 2019-08-10 RX ADMIN — MAGNESIUM SULFATE IN WATER 2 G: 40 INJECTION, SOLUTION INTRAVENOUS at 13:13

## 2019-08-10 RX ADMIN — LEVOTHYROXINE SODIUM 25 MCG: 25 TABLET ORAL at 05:04

## 2019-08-10 RX ADMIN — POTASSIUM BICARBONATE 50 MEQ: 978 TABLET, EFFERVESCENT ORAL at 13:12

## 2019-08-10 RX ADMIN — ATORVASTATIN CALCIUM 40 MG: 40 TABLET, FILM COATED ORAL at 21:28

## 2019-08-10 RX ADMIN — SODIUM CHLORIDE: 9 INJECTION, SOLUTION INTRAVENOUS at 21:28

## 2019-08-10 RX ADMIN — ASPIRIN 325 MG: 325 TABLET, FILM COATED ORAL at 13:12

## 2019-08-10 RX ADMIN — DONEPEZIL HYDROCHLORIDE 10 MG: 5 TABLET, FILM COATED ORAL at 18:20

## 2019-08-10 RX ADMIN — CEFTRIAXONE SODIUM 1 G: 1 INJECTION, POWDER, FOR SOLUTION INTRAMUSCULAR; INTRAVENOUS at 05:04

## 2019-08-10 RX ADMIN — SODIUM CHLORIDE: 9 INJECTION, SOLUTION INTRAVENOUS at 05:05

## 2019-08-10 NOTE — PROGRESS NOTES
Critical Care Progress Note    Date of admission  8/8/2019    Chief Complaint  88 y.o. female who presented 8/8/2019 with recent fall that was evaluated in the emergency department at [facility].  At the time she was neurologically intact and a head CT was unremarkable.  She was subsequently discharged.  Apparently there was some notation that she had a right-sided gaze preference before she went to sleep. This morning she was found by her son with altered mental status, dysarthria left-sided facial droop and flaccid left upper and lower extremities.  She was taken back to the emergency department.  A repeat head CT revealed no acute changes.  She was subsequently transferred to [facility] for further evaluation.  Last known well 2200 on 8/7/2019.  Neurologic changes noted at 1130 on the morning of 8/8/2019.  Neurology was consulted.  NIH score-14.  CT perfusion revealed a penumbra of 60 mL's.  CT angio  revealed a right MCA large vessel occlusion.  She was taken to IR for thrombectomy.    Hospital Course          Interval Problem Update  Reviewed last 24 hour events:  Hemorrhagic conversion MRI  sbp < 160  Alert and oriented X 3 but ongoing confusion, baseline dementia  Continued deficit, flaccid left side  Left facial droop  Neglect left  Sinus rhythm  2 min idioventricular rhythm, normalized  ? Svt runs  Echo done this am  NPO  Core trak  Failed speech eval  265 ml overnight  2 peripherals  2 L   Rocephin day 2 of 3  Urine cx from 8th gram neg rods  Hb 9.4  plt uptrending  Mg 1.7, 2 g mg  k 4.4  Asa held   target  q 2 hour neuro checks  Progress to q 4 hours with continued stability end of day    Review of Systems  Review of Systems   Unable to perform ROS: Acuity of condition        Vital Signs for last 24 hours   Temp:  [36.6 °C (97.8 °F)-37.5 °C (99.5 °F)] 36.6 °C (97.8 °F)  Pulse:  [55-93] 72  Resp:  [15-36] 24  BP: (118-163)/() 138/104  SpO2:  [90 %-98 %] 96  %    Hemodynamic parameters for last 24 hours       Respiratory Information for the last 24 hours       Physical Exam   Physical Exam   Constitutional: She appears distressed.   fatigued   HENT:   Head: Normocephalic and atraumatic.   Right Ear: External ear normal.   Left Ear: External ear normal.   Mouth/Throat: No oropharyngeal exudate.   Eyes: Pupils are equal, round, and reactive to light. Conjunctivae and EOM are normal. Right eye exhibits no discharge. Left eye exhibits no discharge.   Neck: No JVD present. No tracheal deviation present.   Cardiovascular: Normal rate, regular rhythm and normal heart sounds.   No murmur heard.  Pulmonary/Chest: Effort normal and breath sounds normal. No stridor. No respiratory distress. She has no wheezes. She has no rales. She exhibits no tenderness.   Abdominal: She exhibits no mass. There is no tenderness. There is no rebound and no guarding.   Musculoskeletal: She exhibits no edema, tenderness or deformity.   Neurological: She is alert. She displays normal reflexes. A cranial nerve deficit is present. Coordination normal.   Skin: Skin is warm and dry. No rash noted. She is not diaphoretic. No erythema.   Nursing note and vitals reviewed.      Medications  Current Facility-Administered Medications   Medication Dose Route Frequency Provider Last Rate Last Dose   • Pharmacy Consult: Enteral tube insertion - review meds/change route/product selection  1 Each Other PHARMACY TO DOSE Messi Mann M.D.       • senna-docusate (PERICOLACE or SENOKOT S) 8.6-50 MG per tablet 2 Tab  2 Tab Enteral Tube BID Messi Mann M.D.   Stopped at 08/10/19 0600    And   • polyethylene glycol/lytes (MIRALAX) PACKET 1 Packet  1 Packet Enteral Tube QDAY PRN Messi Mann M.D.        And   • magnesium hydroxide (MILK OF MAGNESIA) suspension 30 mL  30 mL Enteral Tube QDAY PRN Messi Mann M.D.        And   • bisacodyl (DULCOLAX) suppository 10 mg  10 mg Rectal QDAY PRN  Messi Mann M.D.       • ondansetron (ZOFRAN ODT) dispertab 4 mg  4 mg Enteral Tube Q4HRS PRN Messi Mann M.D.       • atorvastatin (LIPITOR) tablet 40 mg  40 mg Enteral Tube Nightly Messi Mann M.D.   40 mg at 08/09/19 2005   • levothyroxine (SYNTHROID) tablet 25 mcg  25 mcg Enteral Tube AM ES Messi Mann M.D.   25 mcg at 08/10/19 0504   • acetaminophen (TYLENOL) tablet 650 mg  650 mg Enteral Tube Q6HRS PRN Messi Mann M.D.   650 mg at 08/09/19 2005   • donepezil (ARICEPT) tablet 10 mg  10 mg Enteral Tube Q EVENING Messi Mann M.D.   10 mg at 08/09/19 1812   • cefTRIAXone (ROCEPHIN) 1 g in  mL IVPB  1 g Intravenous Q24HRS Carlos Gay M.D.   Stopped at 08/10/19 0534   • labetalol (NORMODYNE,TRANDATE) injection 10 mg  10 mg Intravenous Q4HRS PRN Carlos Gay M.D.       • NS infusion   Intravenous Continuous Glory Mendiola M.D. 83 mL/hr at 08/10/19 0505     • enalaprilat (VASOTEC) injection 1.25 mg  1.25 mg Intravenous Q6HRS PRN Carlos Gay M.D.       • ondansetron (ZOFRAN) syringe/vial injection 4 mg  4 mg Intravenous Q4HRS PRN Glory Mendiola M.D.           Fluids    Intake/Output Summary (Last 24 hours) at 8/10/2019 0724  Last data filed at 8/10/2019 0600  Gross per 24 hour   Intake 2484 ml   Output 690 ml   Net 1794 ml       Laboratory      Recent Labs     08/07/19  1143 08/08/19  1300   TROPONINI <0.02 0.04   BNPBTYPENAT  --  223*     Recent Labs     08/07/19  1143  08/08/19  1511 08/09/19  0300 08/10/19  0410   SODIUM 135*   < > 133* 137 141   POTASSIUM 4.2   < > 4.4 4.2 3.3*   CHLORIDE 100   < > 99 105 113*   CO2 26   < > 22 20 19*   BUN 11   < > 12 10 11   CREATININE 1.0   < > 0.83 0.69 0.58   MAGNESIUM 1.8  --   --   --  1.7   CALCIUM 8.7   < > 8.8 7.4* 7.0*    < > = values in this interval not displayed.     Recent Labs     08/07/19  1143 08/08/19  1300 08/08/19  1511 08/09/19  0300 08/10/19  0410   ALTSGPT 18 19 16  --   --    ASTSGOT 23 28 27  --    --    ALKPHOSPHAT 83 82 68  --   --    TBILIRUBIN 0.5 0.7 0.6  --   --    LIPASE 209  --   --   --   --    PREALBUMIN  --   --   --   --  7.0*   GLUCOSE 102* 122* 112* 96 86     Recent Labs     08/07/19  1143 08/08/19  1300 08/08/19  1510 08/08/19  1511 08/09/19  0235 08/10/19  0410   WBC 11.1* 12.9* 11.4*  --  9.7 11.2*   NEUTSPOLYS  --   --  84.80*  --  75.20* 69.60   LYMPHOCYTES  --   --  10.10*  --  14.60* 19.90*   MONOCYTES  --   --  4.60  --  9.60 9.20   EOSINOPHILS  --   --  0.00  --  0.00 0.30   BASOPHILS  --   --  0.10  --  0.20 0.20   ASTSGOT 23 28  --  27  --   --    ALTSGPT 18 19  --  16  --   --    ALKPHOSPHAT 83 82  --  68  --   --    TBILIRUBIN 0.5 0.7  --  0.6  --   --      Recent Labs     08/07/19  1143  08/08/19  1510 08/08/19  1511 08/09/19  0235 08/09/19  1135 08/10/19  0410   RBC 4.32   < > 4.37  --  2.82*  --  3.01*   HEMOGLOBIN 13.4   < > 13.7  --  8.5* 9.7* 9.4*   HEMATOCRIT 40.2   < > 41.0  --  27.3* 29.6* 28.9*   PLATELETCT 199   < > 182  --  128*  --  140*   PROTHROMBTM 11.4  --   --  14.6  --   --   --    INR 1.08  --   --  1.11  --   --   --     < > = values in this interval not displayed.       Imaging  X-Ray:  I have personally reviewed the images and compared with prior images.  EKG:  I have personally reviewed the images and compared with prior images.  CT:    Reviewed  MRI:   Reviewed    Assessment/Plan  * CVA (cerebral vascular accident) (HCC)- (present on admission)  Assessment & Plan  Right MCA s/p thrombectomy  No TPA as outside window  SBP goal < 160 since hemorrhagic conversion on imaging  neurochecks q 2 hour, progress to q 4 hour end of day with stability  Discussed with family  Significant stroke, poor prognosis given current state of health  High aspiration risk    Acute cystitis without hematuria  Assessment & Plan  Ceftriaxone 3 days  Gram neg rods joyce labs    CAD (coronary artery disease)- (present on admission)  Assessment & Plan  No acute issue currently    Falls  frequently- (present on admission)  Assessment & Plan  Frequent falls    Dementia- (present on admission)  Assessment & Plan  Baseline  Discussed with son       VTE:  Contraindicated  Ulcer: Not Indicated  Lines: None    I have performed a physical exam and reviewed and updated ROS and Plan today (8/10/2019). In review of yesterday's note (8/9/2019), there are no changes except as documented above.     Discussed patient condition and risk of morbidity and/or mortality with Hospitalist, Family, RN, RT, Pharmacy, Code status disscussed, Charge nurse / hot rounds, Patient and neurology     The patient remains critically ill.  She is post thrombectomy.  Close neurological monitoring with q 2 hour neuro checks. She had hemorrhagic conversion of brain imaging.  High risk for clinical deterioration including intracranial bleeding, neurological dysfunction of which would lead to possible cardiopulmonary arrest, mechanical ventilation or even death without critical care monitoring and management. CLose blood pressure monitoring for goal < 160 currently.  Critical care time = 32 minutes in directly providing and coordinating critical care and extensive data review.  No time overlap and excludes procedures.

## 2019-08-10 NOTE — PROGRESS NOTES
Dr. Camejo notified of low urine output (approx 20 ml/hr). States that this is probably patient's norm due to body weight and normal urine output per kg. No new orders at this time. Continue to monitor.

## 2019-08-10 NOTE — PROGRESS NOTES
Cortrak Placement    RN verified patient name and medical record number prior to tube placement.  Cortrak tube (43 inches, 10 Slovenian) placed at 75 cm in left nare.  Per Cortrak picture, tube appears to be in the stomach.  Nursing Instructions: Awaiting KUB to confirm placement before use for medications or feeding. Once placement confirmed, flush tube with 30 ml of water, and then remove and save stylet, in patient medication drawer.    100- placement confirmed in distal stomach. TF restarted.

## 2019-08-10 NOTE — PROGRESS NOTES
Hospital Neurology Progress Note:     Interval History:     Subjective:     Objective:   Encounter Vitals  Standard Vitals  Vitals  Blood Pressure : 150/70  Temperature: 36.4 °C (97.5 °F)  Temp src: Temporal  Pulse: 70  Respiration: (!) 22  Pulse Oximetry: 97 %  Height: 152.4 cm (5')  Weight: 61.4 kg (135 lb 5.8 oz)  Encounter Vitals  Temperature: 36.4 °C (97.5 °F)  Temp src: Temporal  Blood Pressure : 150/70  Pulse: 70  Respiration: (!) 22  Pulse Oximetry: 97 %  O2 (LPM): 2  O2 Delivery: Silicone Nasal Cannula  Weight: 61.4 kg (135 lb 5.8 oz)  Weight Source: Bed Scale  Height: 152.4 cm (5')  BMI (Calculated): 24.76  Location: Back  Description: Aching  Breastfeeding Status: No  Pulmonary-Specific Vitals     Durable Medical Equipment-Specific Vitals  DME  O2 (LPM): 2  O2 Delivery: Silicone Nasal Cannula        Labs:   Lab Results   Component Value Date/Time    SODIUM 141 08/10/2019 04:10 AM    POTASSIUM 3.3 (L) 08/10/2019 04:10 AM    CHLORIDE 113 (H) 08/10/2019 04:10 AM    CO2 19 (L) 08/10/2019 04:10 AM    GLUCOSE 86 08/10/2019 04:10 AM    BUN 11 08/10/2019 04:10 AM    CREATININE 0.58 08/10/2019 04:10 AM      Lab Results   Component Value Date/Time    PROTHROMBTM 14.6 08/08/2019 03:11 PM    INR 1.11 08/08/2019 03:11 PM      Lab Results   Component Value Date/Time    WBC 11.2 (H) 08/10/2019 04:10 AM    RBC 3.01 (L) 08/10/2019 04:10 AM    HEMOGLOBIN 9.4 (L) 08/10/2019 04:10 AM    HEMATOCRIT 28.9 (L) 08/10/2019 04:10 AM    MCV 96.0 08/10/2019 04:10 AM    MCH 31.2 08/10/2019 04:10 AM    MCHC 32.5 (L) 08/10/2019 04:10 AM    MPV 9.6 08/10/2019 04:10 AM    NEUTSPOLYS 69.60 08/10/2019 04:10 AM    LYMPHOCYTES 19.90 (L) 08/10/2019 04:10 AM    MONOCYTES 9.20 08/10/2019 04:10 AM    EOSINOPHILS 0.30 08/10/2019 04:10 AM    BASOPHILS 0.20 08/10/2019 04:10 AM        Imaging/Testing:     MRI brain   8/9   Impression       1.  Moderate-sized acute right MCA territory infarct with hemorrhagic transformation.  2.  Moderate diffuse  cervical substance loss.  3.  Advanced microangiopathic ischemic change versus demyelination or gliosis.  4.  Chronic ischemic pontine gliosis.  5.  Findings of pansinusitis.       Physical Exam:     General:   Cardio: Normal S1/S2. No peripheral edema.   Pulm: CTAX2. No respiratory distress.   Skin: Warm, dry, no rashes or lesions   Psychiatric: Appropriate affect. No active psychosis.  HEENT: Atraumatic head, normal sclera and conjunctiva, moist oral mucosa. No lid lag.  Abdomen: Soft, non tender. No masses or hepatosplenomegaly.    Neurologic:  Mental Status: Awake and alert and with gaze preference neglecting left side   Able to follow commands on RHB.(shows two fingers and wiggles toes)   Cranial Nerves:  PERRL. Right forced gaze deviation.   Motor: no movement in LHB.  Reflexes: upgoing toes L  Coordination: untestable   Sensation: w/d to pain moves RHB and w/d to pain   Gait/Station: deferred     Assessment/Plan:  88-year old female with PMhx significant for CAD, dyslipidemia, anxiety/depression who presented to [facility] as a transfer from [facility] on 8/8/19 for a chief complaint of Left sided weakness and Right gaze preference; not a candidate for IV tPA secondary to presentation time greater than 4.5 hours from time of symptom onset. CT[facility] at [facility] revealed Right MCA occlusion, prompting immediate transfer to [facility] [place] for thrombectomy. Patient now s/p IR thrombectomy of Right MCA occlusion with TICI 3.   She is currently awake and alert and follows commands on right hemibody.  She is not able to move her left arm or leg.       Impression:  Right MCA bifurcation thrombus, s/p thrombectomy on 8/8/19 (TICI 3).  R MCA with hemorrhagic transformation   Dislipidemia.   Hypertension.   Hx of CAD.      Recommendations/Plan:   - BP goal <140   - OK to start ASA 81 mg daily given MCA stenosis   - May need dual antiplatelets in the future   - Repeat CT head tonight given risk for midline shift and  cytotoxic edema    -TTE done interpretation pending   -Atorvastatin 80 mg PO/NGT q HS. Goal LDL <70 and HDL>50   -Recommend aggressive BG management per primary team, Avoid IVF with Dextrose. BG goal normotension.  -PT/OT/SLP eval and treat. Physiatry consult.   -Will follow up with results of the above and make additional recommendations accordingly.   -DVT Prophylaxis: SCDs.      Plan:  Plan discussed with consulting physician and patient's nurse.     Shelbi Merino MD PhD   Board Certified Neurologist

## 2019-08-10 NOTE — CARE PLAN
Problem: Safety  Goal: Will remain free from injury  Outcome: PROGRESSING AS EXPECTED     Problem: Safety  Goal: Will remain free from falls  Outcome: PROGRESSING AS EXPECTED       Pt will be montiored, bed alarm on, call light within reach. Hourly rounding.       Problem: Communication  Goal: The ability to communicate needs accurately and effectively will improve  Outcome: PROGRESSING SLOWER THAN EXPECTED

## 2019-08-10 NOTE — PROGRESS NOTES
2 RN skin check complete with Racquel.   Devices in place cortrak to right nare at 75.  Skin assessed under all devices.  Scattered generalized bruising to left side of body, to left upper and lower arm, left shoulder and upper back, to right and left chest, to left and right knee, left hip and buttocks nonblanching. Scab to left toe and left knee, left hand has a skin tear, left face scrape, red bruising under red wrist restraint, right knee has possible DTI. Left buttocks pink and purple non blanching with moisture dermatitis. Wound consult placed in RICU verified by oncoming RN.  The following interventions in place, 2 RN skin check, Q2 Hour turns, Waffle overlay, mepilex, float boot and heel drop boot rotated Q2 hours

## 2019-08-10 NOTE — PROGRESS NOTES
At approx 2345 nurse noticed patient pulled cotrack out of nose. Pt placed in restraints, new cotrack placed. Waiting on xray results to confirm placement.

## 2019-08-10 NOTE — PROGRESS NOTES
Admission 2 RN skin check complete.   Pt found down at home after previous fall at home the day before. Pt has scattered generalized bruising. Worse on Left side/flank, left arm , left leg, left face and head.   Open wounds noted on left foot, Left knee, L hand skin tear, Abrasion on left cheek . Non blanching redness on left buttocks. Non blanching redness on Rt knee.  Wound pictures taken.     Devices in place silicone nasal cannula, cuff, temporary 02 probe, ekg .    The following interventions in place q2 hour turns, rotating devices,  floating extremities, mepilex on buttocks.

## 2019-08-10 NOTE — PROGRESS NOTES
At approx 0310, patient had approx 2 mins of what appeared to be AIVR on monitor , rates 60's. Patient now currently SR, rates 60's-70s. Dr Camejo aware. No orders at this time.

## 2019-08-10 NOTE — PROGRESS NOTES
Is Ramiro… like this [facility] Medicine Daily Progress Note    Date of Service  8/10/2019    Chief Complaint  88 y.o. female admitted 8/8/2019 with stroke with left hemiparesis underwent thrombectomy at 17:46    Hospital Course         Interval Problem Update    Lethargic  RUE 4/5 RLE 2/5  Left hemiplegia  TF at goal  K 3.3 Mg 1.7  2 L NC  SR  Day 2/3 ceftriaxone       Consultants/Specialty slight for  Neurology  CC    Code Status  DNAR/I ok    Disposition  TBD    Review of Systems  Review of Systems   Unable to perform ROS: Medical condition        Physical Exam  Temp:  [36.4 °C (97.5 °F)-37.4 °C (99.3 °F)] 36.4 °C (97.5 °F)  Pulse:  [55-93] 72  Resp:  [15-36] 32  BP: (118-163)/() 134/62  SpO2:  [90 %-98 %] 98 %    Physical Exam   Constitutional: She appears well-developed.   HENT:   Head: Normocephalic and atraumatic.   Mouth/Throat: No oropharyngeal exudate.   cortrak in place   Left ecchymosis unchanged   Eyes: Pupils are equal, round, and reactive to light. Right eye exhibits no discharge. Left eye exhibits no discharge. No scleral icterus.   Neck: Neck supple. No JVD present. No tracheal deviation present.   Cardiovascular: Normal rate and regular rhythm. Exam reveals no gallop and no friction rub.   Murmur heard.  Pulmonary/Chest: Effort normal. No respiratory distress. She has decreased breath sounds. She has no wheezes. She has no rales. She exhibits no tenderness.   Abdominal: Soft. Bowel sounds are normal. She exhibits no distension. There is no tenderness. There is no rebound.   Musculoskeletal: She exhibits edema. She exhibits no tenderness.   Neurological: No cranial nerve deficit. She exhibits normal muscle tone.   Somnolent arousable oriented x3  Follows command on the right  Left hemiplegia   Skin: Skin is warm and dry. She is not diaphoretic. No cyanosis. Nails show no clubbing.   Psychiatric: Her speech is delayed.   Nursing note and vitals reviewed.      Fluids    Intake/Output Summary (Last  24 hours) at 8/10/2019 0842  Last data filed at 8/10/2019 0800  Gross per 24 hour   Intake 2484 ml   Output 615 ml   Net 1869 ml       Laboratory  Recent Labs     08/08/19  1510 08/09/19  0235 08/09/19  1135 08/10/19  0410   WBC 11.4* 9.7  --  11.2*   RBC 4.37 2.82*  --  3.01*   HEMOGLOBIN 13.7 8.5* 9.7* 9.4*   HEMATOCRIT 41.0 27.3* 29.6* 28.9*   MCV 93.8 96.8  --  96.0   MCH 31.4 30.1  --  31.2   MCHC 33.4* 31.1*  --  32.5*   RDW 42.2 43.9  --  43.5   PLATELETCT 182 128*  --  140*   MPV 9.5 9.5  --  9.6     Recent Labs     08/08/19  1511 08/09/19  0300 08/10/19  0410   SODIUM 133* 137 141   POTASSIUM 4.4 4.2 3.3*   CHLORIDE 99 105 113*   CO2 22 20 19*   GLUCOSE 112* 96 86   BUN 12 10 11   CREATININE 0.83 0.69 0.58   CALCIUM 8.8 7.4* 7.0*     Recent Labs     08/07/19  1143 08/08/19  1511   INR 1.08 1.11     Recent Labs     08/08/19  1300   BNPBTYPENAT 223*     Recent Labs     08/09/19  0300   TRIGLYCERIDE 75   HDL 35*   LDL 53       Imaging  [id number] VIEW   Final Result         Feeding tube with tip projecting over the expected area of the distal stomach.      DX-ABDOMEN FOR TUBE PLACEMENT   Final Result      Feeding tube tip projects over the distal stomach or duodenal bulb.      DX-ABDOMEN FOR TUBE PLACEMENT   Final Result      1.  Feeding tube tip projects near the GE junction.      MR-BRAIN-W/O   Final Result      1.  Moderate-sized acute right MCA territory infarct with hemorrhagic transformation.   2.  Moderate diffuse cervical substance loss.   3.  Advanced microangiopathic ischemic change versus demyelination or gliosis.   4.  Chronic ischemic pontine gliosis.   5.  Findings of pansinusitis.      These findings were discussed with MORIS CANTU on 8/9/2019 2:50 PM. These findings were discussed with Dr. Mahendra Mann for Dr. MORIS CANTU on 8/9/2019 2:56 PM.      DX-CHEST-PORTABLE (1 VIEW)   Final Result      1.  No acute cardiopulmonary disease.   2.  Contrast present within mildly dilated LEFT renal  collecting system.      IR-THROMBO MECHANICAL ARTERY,INIT   Final Result      1.  Occlusion of the right middle cerebral artery, M2 segment.      2.  Successful cerebral thrombectomy performed with post intervention angiogram demonstrating patent right middle cerebral artery vessels.      Findings were discussed with Dr. Miramontes at approximately 1755 hrs.      OUTSIDE IMAGES-CT HEAD   Final Result      OUTSIDE IMAGES-CT HEAD   Final Result      CT-CTA NECK WITH & W/O-POST PROCESSING   Final Result      Moderate calcified plaque right carotid artery bifurcation. No significant stenosis.   Left carotid arteries appear patent.   Vertebral arteries appear patent.   Focal enlargement of right lingual tonsils measuring 12.6 mm in diameter. Patient's condition permits, would consider further evaluation.      CT-CEREBRAL PERFUSION ANALYSIS   Final Result      1.  Cerebral blood flow less than 30% likely representing completed infarct = 38 mL.      2.  T Max more than 6 seconds likely representing combination of completed infarct and ischemia = 98 mL.      3.  Mismatched volume likely representing ischemic brain/penumbra = 60 mL      4.  Please note that the cerebral perfusion was performed on the limited brain tissue around the basal ganglia region. Infarct/ischemia outside the CT perfusion sections can be missed in this study.      CT-CTA HEAD WITH & W/O-POST PROCESS    (Results Pending)   EC-ECHOCARDIOGRAM COMPLETE W/ CONT    (Results Pending)        Assessment/Plan  * CVA (cerebral vascular accident) (HCC)- (present on admission)  Assessment & Plan  Status post thrombectomy  MRI with hemorrhagic transformation   Discussed with Dr. Morrow from IR and with neurology concern for MCA stenosis    Start aspirin  Continue atorvastatin  Neurology recommending repeat CT tonight ordered  Follow-up on echocardiogram results  PT/OT/SLP as tolerated      Electrolyte abnormality  Assessment & Plan  Hypokalemia  Hypomagnesemia    Replete  and monitor    Acute cystitis without hematuria  Assessment & Plan  Continue ceftriaxone and follow-up on final culture    Tonsillar enlargement  Assessment & Plan  Noted on CT  Will need further work-up depending on clinical recovery    Anemia, blood loss  Assessment & Plan  Hemoglobin stable overnight with no signs of active bleed continue to monitor    Acute encephalopathy  Assessment & Plan  Likely related to her stroke    More alert today continue to monitor        DNR (do not resuscitate)- (present on admission)  Assessment & Plan        Hyponatremia- (present on admission)  Assessment & Plan  improved    Leukocytosis  Assessment & Plan  Secondary to UTI with positive cultures at outside hospital    Patient completed 3-day course of ceftriaxone and follow-up on final culture sensitivities    CAD (coronary artery disease)- (present on admission)  Assessment & Plan  Continue atorvastatin restarting aspirin      Dementia- (present on admission)  Assessment & Plan  Continue aricept  Avoid sedating agents  Monitor for delirium         Plan of care discussed with patient and reviewed with nursing staff IR neurology critical care    VTE prophylaxis: SCD

## 2019-08-11 LAB
ANION GAP SERPL CALC-SCNC: 6 MMOL/L (ref 0–11.9)
BASOPHILS # BLD AUTO: 0.2 % (ref 0–1.8)
BASOPHILS # BLD: 0.03 K/UL (ref 0–0.12)
BUN SERPL-MCNC: 12 MG/DL (ref 8–22)
CALCIUM SERPL-MCNC: 8.4 MG/DL (ref 8.5–10.5)
CHLORIDE SERPL-SCNC: 106 MMOL/L (ref 96–112)
CO2 SERPL-SCNC: 26 MMOL/L (ref 20–33)
CREAT SERPL-MCNC: 0.59 MG/DL (ref 0.5–1.4)
EOSINOPHIL # BLD AUTO: 0.13 K/UL (ref 0–0.51)
EOSINOPHIL NFR BLD: 0.9 % (ref 0–6.9)
ERYTHROCYTE [DISTWIDTH] IN BLOOD BY AUTOMATED COUNT: 43.7 FL (ref 35.9–50)
GLUCOSE SERPL-MCNC: 117 MG/DL (ref 65–99)
HCT VFR BLD AUTO: 30.5 % (ref 37–47)
HGB BLD-MCNC: 10.2 G/DL (ref 12–16)
IMM GRANULOCYTES # BLD AUTO: 0.09 K/UL (ref 0–0.11)
IMM GRANULOCYTES NFR BLD AUTO: 0.6 % (ref 0–0.9)
LYMPHOCYTES # BLD AUTO: 2.12 K/UL (ref 1–4.8)
LYMPHOCYTES NFR BLD: 15 % (ref 22–41)
MAGNESIUM SERPL-MCNC: 1.9 MG/DL (ref 1.5–2.5)
MCH RBC QN AUTO: 31.9 PG (ref 27–33)
MCHC RBC AUTO-ENTMCNC: 33.4 G/DL (ref 33.6–35)
MCV RBC AUTO: 95.3 FL (ref 81.4–97.8)
MONOCYTES # BLD AUTO: 1.17 K/UL (ref 0–0.85)
MONOCYTES NFR BLD AUTO: 8.3 % (ref 0–13.4)
NEUTROPHILS # BLD AUTO: 10.57 K/UL (ref 2–7.15)
NEUTROPHILS NFR BLD: 75 % (ref 44–72)
NRBC # BLD AUTO: 0 K/UL
NRBC BLD-RTO: 0 /100 WBC
PLATELET # BLD AUTO: 171 K/UL (ref 164–446)
PMV BLD AUTO: 9.7 FL (ref 9–12.9)
POTASSIUM SERPL-SCNC: 3.9 MMOL/L (ref 3.6–5.5)
RBC # BLD AUTO: 3.2 M/UL (ref 4.2–5.4)
SODIUM SERPL-SCNC: 138 MMOL/L (ref 135–145)
WBC # BLD AUTO: 14.1 K/UL (ref 4.8–10.8)

## 2019-08-11 PROCEDURE — 770020 HCHG ROOM/CARE - TELE (206)

## 2019-08-11 PROCEDURE — 700102 HCHG RX REV CODE 250 W/ 637 OVERRIDE(OP): Performed by: HOSPITALIST

## 2019-08-11 PROCEDURE — 99232 SBSQ HOSP IP/OBS MODERATE 35: CPT

## 2019-08-11 PROCEDURE — 99233 SBSQ HOSP IP/OBS HIGH 50: CPT | Performed by: INTERNAL MEDICINE

## 2019-08-11 PROCEDURE — 85025 COMPLETE CBC W/AUTO DIFF WBC: CPT

## 2019-08-11 PROCEDURE — 700111 HCHG RX REV CODE 636 W/ 250 OVERRIDE (IP): Performed by: INTERNAL MEDICINE

## 2019-08-11 PROCEDURE — A9270 NON-COVERED ITEM OR SERVICE: HCPCS | Performed by: HOSPITALIST

## 2019-08-11 PROCEDURE — 700105 HCHG RX REV CODE 258: Performed by: INTERNAL MEDICINE

## 2019-08-11 PROCEDURE — 80048 BASIC METABOLIC PNL TOTAL CA: CPT

## 2019-08-11 PROCEDURE — 770006 HCHG ROOM/CARE - MED/SURG/GYN SEMI*

## 2019-08-11 PROCEDURE — 36415 COLL VENOUS BLD VENIPUNCTURE: CPT

## 2019-08-11 PROCEDURE — 83735 ASSAY OF MAGNESIUM: CPT

## 2019-08-11 RX ORDER — ASPIRIN 81 MG/1
81 TABLET, CHEWABLE ORAL DAILY
Status: DISCONTINUED | OUTPATIENT
Start: 2019-08-12 | End: 2019-08-28 | Stop reason: HOSPADM

## 2019-08-11 RX ADMIN — CEFTRIAXONE SODIUM 1 G: 1 INJECTION, POWDER, FOR SOLUTION INTRAMUSCULAR; INTRAVENOUS at 05:01

## 2019-08-11 RX ADMIN — DONEPEZIL HYDROCHLORIDE 10 MG: 5 TABLET, FILM COATED ORAL at 17:43

## 2019-08-11 RX ADMIN — LEVOTHYROXINE SODIUM 25 MCG: 25 TABLET ORAL at 05:03

## 2019-08-11 RX ADMIN — ASPIRIN 81 MG 81 MG: 81 TABLET ORAL at 05:03

## 2019-08-11 RX ADMIN — ATORVASTATIN CALCIUM 40 MG: 40 TABLET, FILM COATED ORAL at 20:36

## 2019-08-11 NOTE — CARE PLAN
Problem: Communication  Goal: The ability to communicate needs accurately and effectively will improve  Outcome: PROGRESSING AS EXPECTED     Problem: Safety  Goal: Will remain free from injury  Outcome: PROGRESSING AS EXPECTED  Goal: Will remain free from falls  Outcome: PROGRESSING AS EXPECTED     Problem: Infection  Goal: Will remain free from infection  Outcome: PROGRESSING AS EXPECTED     Problem: Venous Thromboembolism (VTW)/Deep Vein Thrombosis (DVT) Prevention:  Goal: Patient will participate in Venous Thrombosis (VTE)/Deep Vein Thrombosis (DVT)Prevention Measures  Outcome: PROGRESSING AS EXPECTED     Problem: Bowel/Gastric:  Goal: Normal bowel function is maintained or improved  Outcome: PROGRESSING AS EXPECTED  Goal: Will not experience complications related to bowel motility  Outcome: PROGRESSING AS EXPECTED     Problem: Knowledge Deficit  Goal: Knowledge of disease process/condition, treatment plan, diagnostic tests, and medications will improve  Outcome: PROGRESSING AS EXPECTED  Goal: Knowledge of the prescribed therapeutic regimen will improve  Outcome: PROGRESSING AS EXPECTED     Problem: Discharge Barriers/Planning  Goal: Patient's continuum of care needs will be met  Outcome: PROGRESSING AS EXPECTED     Problem: Fluid Volume:  Goal: Will maintain balanced intake and output  Outcome: PROGRESSING AS EXPECTED     Problem: Skin Integrity  Goal: Risk for impaired skin integrity will decrease  Outcome: PROGRESSING AS EXPECTED     Problem: Pain Management  Goal: Pain level will decrease to patient's comfort goal  Outcome: PROGRESSING AS EXPECTED     Problem: Respiratory:  Goal: Respiratory status will improve  Outcome: PROGRESSING AS EXPECTED     Problem: Urinary Elimination:  Goal: Ability to reestablish a normal urinary elimination pattern will improve  Outcome: PROGRESSING AS EXPECTED

## 2019-08-11 NOTE — PROGRESS NOTES
2 RN skin check completed with OMID Benito:  -Bruising and scabbing to L cheek bone.   -Bruising to upper chest.   -R groin site bruising with dressing in place, CDI.   -BUE with scattered bruising  -skin intact under R wrist restraint  -L hand skin tear: cleansed with saline and dressed with mepi-lite and mepilex overtop. Picture  uploaded.   -Mepilex over sacral region, all areas blanching at this time.   -redness and moisture between buttocks and gluteal folds.  -Scattered areas of bruising over L hip and thigh.  -L foot abrasion    Waffle mattress overlay in place. Skin under SCD's and foot brace intact. No skin breakdown noted around cortrak site. Q2H turns in place.

## 2019-08-11 NOTE — PROGRESS NOTES
Received report from day shift RN. Pt. alert and oriented x 3 with some confusion to situation. Able to follow all commands appropriately. NIHSS completed. Cortrak in place and running tube feeds. Wrist restraint in place to R extremity, CMS intact. Pt. educated on need to stop pulling at cortrak and lines, reinforcement required. Plan of care discussed. Call light in reach. Bed alarm on.

## 2019-08-11 NOTE — PROGRESS NOTES
Received call from telemetry monitor room. Notified that patient had 9 beats of accelerated idioventricular. Pt. currently asymptomatic without changes to baseline. Magnesium lab drawn and pending results. Telemetry strip placed in pt. Chart. Will continue to monitor.

## 2019-08-11 NOTE — CARE PLAN
Problem: Bowel/Gastric:  Goal: Normal bowel function is maintained or improved  Outcome: PROGRESSING SLOWER THAN EXPECTED   Multiple loose stools this shift.   Problem: Skin Integrity  Goal: Risk for impaired skin integrity will decrease  Outcome: PROGRESSING SLOWER THAN EXPECTED   Incontinence associated dermatitis. Anjelica area red and excoriated.

## 2019-08-11 NOTE — PROGRESS NOTES
Monitor summary: SR 69-88, WV .14, QRS .08, QT .36, with rare PVCs, PACs, and couplet per strip from monitor room.

## 2019-08-11 NOTE — CONSULTS
Reason for PC Consult: Advance Care Planning    Consulted by: Dr Mahendra Mann    Assessment:  General: 89 yo female admitted on 8/8/2019 for CVA. PMH: Allergic rhinitis, Asthma, CAD, Carpal tunnel syndrome, Cataracts-bilat, Cervical spine pain, Chest wall contusion-2013, Chronic low back pain, Degenerative arthritis of spine, Dementia, Depression with anxiety, Diverticulitis, Dry eye syndrome, Dyslipidemia, Gallstones, GERD, H. Pylori infection, Internal hemorrhoids, Laceration of head-2013, Microscopic colitis, Migraine, Osteoporosis, Overactive bladder, Peptic ulcer disease, Radiculopathy, Renal insufficiency, Rotator cuff tear, Scoliosis, Spinal stenosis, Spondylolisthesis, Subdural hematiome-2012, Thyroid nodule, Urinary incontinence, Vitamin D deficiency.     Pt presented to the ED at [facility] after a GLF. Pt was assessed and released back to home with her son. Pt last seen at baseline that evening at approx 22:00. Pts son found the pt at approx 10:30 am altered with neurological deficits. Pt returned to the ED at [facility], pt was transferred to [facility]. Pt was shown to have large MCA occlusion. Pt sent for IR thrombectomy.   Pt had thrombectomy 8/8 at 17:46, pt had hemorrhagic transformation.     Dyspnea: No, resp rate 20, 92% on room air.   Last BM: 08/11/19   Pain: No    Depression: Mood appropriate for situation    Dementia: Unable to Determine       Spiritual:  Is Sikh or spirituality important for coping with this illness? Yes   Has a  or spiritual provider visit been requested? No    Palliative Performance Scale: 40%    Advance Directive: Advance Directive on file.   DPOA: Yes, Mikael King [phone], son  POLST: Yes, [place] DNR order    Code Status: DNR- (I ok)    Outcome:  Met with the pt, Dr Fuentes, and BS RN myself at the bedside. Pt confirmed with Dr Fuentes, that she understood she has suffered a stroke. After exam spoke with pt. Introduced self and the role  of Palliative care. Pt confirmed that she lives with her son.   Spoke with pt about her current level of disability. Spoke about her choices on her AD and her DNR order.   Spoke about the options of Hospice support vs SNF with therapies. Pt stated that she felt she may wish for Hospice rather than going to a skilled.   Pt asked that I call her son to discuss further. Called and spoke with Mikael. Updated on the conversation I had with his mother. Explained pts disabilities, spoke about pts feelings as evidenced by her AD. Explained what the SNF would require, for the pt to participate and be engaged in recovery efforts, even if she would only be able to make very small advances.   Spoke about the pts age, and willingness to participate. Spoke about hospice support and quality of life.   Mikael stated that he would be driving in later today and would like to speak with his mother and possibly the MD further.   Provided Mikael with Palliative Team contact number and let him know we work together with his mother on her POC/GOC.     Updated: Dr Fuentes, BS RN     Plan: TBD, pt and family wish for further discussions before making decision for SNF vs Hospice.     Recommendations: I do not recommend an ethics or hospice consult at this time because pt and family wish to discuss options further before making final decisions. .    Thank you for allowing Palliative Care to participate in this patient's care. Please feel free to call [phone] with any questions or concerns.

## 2019-08-11 NOTE — PROGRESS NOTES
Hospital Medicine Daily Progress Note    Date of Service  8/11/2019    Chief Complaint  GLF, Left hemneglect    Hospital Course    *88-year-old female with reported history of dementia on Aricept history of coronary disease, frequent falls presented with history of ground-level fall, right-sided gaze and left-sided neglect she was found on the floor and symptoms were of unknown duration she had no ICH on exam but was way beyond the window for alteplase and so underwent thrombectomy for MCA stroke.  She was initially monitored in ICU, she has dysphasia and therefore has core track, she remains with hemineglect but encephalopathy has improved.  Palliative care is meeting with her as well as discussing care plan with son*      Interval Problem Update  Seen with RN, discussed with palliative care  I reviewed patient's reported advanced directives and clearly she does not want this reduce quality of life and this will need to be explored further.  At this point she is undecided regarding her wishes and wants her son more involve given her degree of orientation I am not sure she is very demented    Consultants/Specialty  Neurology, pulmonary medicine, palliative care    Code Status  DNR  Currently intubation okay  Has stated she does not want feeding tubes but apparently wants artificial hydration    Disposition  TBD    Review of Systems limited by frailty  She denies pain, headache, shortness of breath, nausea, chest pain.  Review of Systems   Unable to perform ROS: Acuity of condition        Physical Exam  Temp:  [35.9 °C (96.7 °F)-36.8 °C (98.3 °F)] 36.3 °C (97.4 °F)  Pulse:  [62-96] 62  Resp:  [19-24] 20  BP: (110-168)/(80-87) 151/82  SpO2:  [91 %-93 %] 92 %    Physical Exam   Constitutional: She is oriented to person, place, and time. She appears well-developed. No distress.   Thin, frail, elderly   HENT:   Head: Normocephalic.   Core track, left temporal contusion left hemineglect preference for right word gaze    Eyes: Pupils are equal, round, and reactive to light. Right eye exhibits no discharge. Left eye exhibits no discharge.   Preference for right word gaze   Neck: Neck supple.   Cardiovascular: Normal rate and regular rhythm.   Murmur heard.  Pulmonary/Chest:   Shallow effort lungs appear to be clear  Prominence of upper sternum  Contusion left upper chest left shoulder left upper arm   Abdominal: Soft. Bowel sounds are normal. She exhibits no distension. There is no tenderness.   Musculoskeletal: She exhibits no edema or deformity.   Neurological: She is alert and oriented to person, place, and time. A cranial nerve deficit is present. She exhibits abnormal muscle tone.   Left hemiparesis   Skin: Skin is warm and dry. She is not diaphoretic.   Psychiatric:   Subdued   Nursing note and vitals reviewed.      Fluids    Intake/Output Summary (Last 24 hours) at 8/11/2019 1623  Last data filed at 8/11/2019 0800  Gross per 24 hour   Intake 740 ml   Output 0 ml   Net 740 ml       Laboratory  Recent Labs     08/09/19  0235 08/09/19  1135 08/10/19  0410 08/11/19  0510   WBC 9.7  --  11.2* 14.1*   RBC 2.82*  --  3.01* 3.20*   HEMOGLOBIN 8.5* 9.7* 9.4* 10.2*   HEMATOCRIT 27.3* 29.6* 28.9* 30.5*   MCV 96.8  --  96.0 95.3   MCH 30.1  --  31.2 31.9   MCHC 31.1*  --  32.5* 33.4*   RDW 43.9  --  43.5 43.7   PLATELETCT 128*  --  140* 171   MPV 9.5  --  9.6 9.7     Recent Labs     08/09/19  0300 08/10/19  0410 08/11/19  0510   SODIUM 137 141 138   POTASSIUM 4.2 3.3* 3.9   CHLORIDE 105 113* 106   CO2 20 19* 26   GLUCOSE 96 86 117*   BUN 10 11 12   CREATININE 0.69 0.58 0.59   CALCIUM 7.4* 7.0* 8.4*             Recent Labs     08/09/19  0300   TRIGLYCERIDE 75   HDL 35*   LDL 53       Imaging  CT-HEAD W/O   Final Result      1.  Acute right MCA infarcts with hemorrhagic transformation seen in the right temporal lobe. No significant change from prior brain MRI given differences in technique.   2.  Atrophy and chronic microvascular ischemic  type changes.      EC-ECHOCARDIOGRAM COMPLETE W/O CONT   Final Result      [id number] VIEW   Final Result         Feeding tube with tip projecting over the expected area of the distal stomach.      DX-ABDOMEN FOR TUBE PLACEMENT   Final Result      Feeding tube tip projects over the distal stomach or duodenal bulb.      DX-ABDOMEN FOR TUBE PLACEMENT   Final Result      1.  Feeding tube tip projects near the GE junction.      MR-BRAIN-W/O   Final Result      1.  Moderate-sized acute right MCA territory infarct with hemorrhagic transformation.   2.  Moderate diffuse cervical substance loss.   3.  Advanced microangiopathic ischemic change versus demyelination or gliosis.   4.  Chronic ischemic pontine gliosis.   5.  Findings of pansinusitis.      These findings were discussed with MORIS CANTU on 8/9/2019 2:50 PM. These findings were discussed with Dr. Mahendra Mann for Dr. MORIS CANTU on 8/9/2019 2:56 PM.      DX-CHEST-PORTABLE (1 VIEW)   Final Result      1.  No acute cardiopulmonary disease.   2.  Contrast present within mildly dilated LEFT renal collecting system.      IR-THROMBO MECHANICAL ARTERY,INIT   Final Result      1.  Occlusion of the right middle cerebral artery, M2 segment.      2.  Successful cerebral thrombectomy performed with post intervention angiogram demonstrating patent right middle cerebral artery vessels.      Findings were discussed with Dr. Miramontes at approximately 1755 hrs.      OUTSIDE IMAGES-CT HEAD   Final Result      OUTSIDE IMAGES-CT HEAD   Final Result      CT-CTA NECK WITH & W/O-POST PROCESSING   Final Result      Moderate calcified plaque right carotid artery bifurcation. No significant stenosis.   Left carotid arteries appear patent.   Vertebral arteries appear patent.   Focal enlargement of right lingual tonsils measuring 12.6 mm in diameter. Patient's condition permits, would consider further evaluation.      CT-CEREBRAL PERFUSION ANALYSIS   Final Result      1.  Cerebral blood  "flow less than 30% likely representing completed infarct = 38 mL.      2.  T Max more than 6 seconds likely representing combination of completed infarct and ischemia = 98 mL.      3.  Mismatched volume likely representing ischemic brain/penumbra = 60 mL      4.  Please note that the cerebral perfusion was performed on the limited brain tissue around the basal ganglia region. Infarct/ischemia outside the CT perfusion sections can be missed in this study.      CT-CTA HEAD WITH & W/O-POST PROCESS    (Results Pending)        Assessment/Plan  * CVA (cerebral vascular accident) (HCC)- (present on admission)  Assessment & Plan  Status post thrombectomy  MRI with hemorrhagic transformation     \"1.  Acute right MCA infarcts with hemorrhagic transformation seen in the right temporal lobe. No significant change from prior brain MRI given differences in technique.  2.  Atrophy and chronic microvascular ischemic type changes.\"          Electrolyte abnormality  Assessment & Plan  Improved/ nl today  F/u labs Tuesday    Acute cystitis without hematuria  Assessment & Plan  S/p CTX  E coli    Tonsillar enlargement  Assessment & Plan  Incidental on CT  Will need further work-up depending on clinical recovery    Anemia, blood loss  Assessment & Plan  stable    Acute encephalopathy  Assessment & Plan  Likely related to her stroke  Resolving she is completely oriented      DNR (do not resuscitate)- (present on admission)  Assessment & Plan        Hyponatremia- (present on admission)  Assessment & Plan  improved    Leukocytosis  Assessment & Plan  Secondary to UTI with positive cultures at outside hospital        CAD (coronary artery disease)- (present on admission)  Assessment & Plan  Continue atorvastatin restarting aspirin      Dementia- (present on admission)  Assessment & Plan  She does not seem very demented  Continue Aricept at this time       VTE prophylaxis: SCDs      "

## 2019-08-12 ENCOUNTER — HOSPICE ADMISSION (OUTPATIENT)
Dept: HOSPICE | Facility: HOSPICE | Age: 84
End: 2019-08-12
Payer: MEDICARE

## 2019-08-12 ENCOUNTER — HOME CARE VISIT (OUTPATIENT)
Dept: HOSPICE | Facility: HOSPICE | Age: 84
End: 2019-08-12
Payer: MEDICARE

## 2019-08-12 ENCOUNTER — APPOINTMENT (OUTPATIENT)
Dept: RADIOLOGY | Facility: MEDICAL CENTER | Age: 84
DRG: 023 | End: 2019-08-12
Attending: HOSPITALIST
Payer: MEDICARE

## 2019-08-12 LAB
CRP SERPL HS-MCNC: 1.5 MG/DL (ref 0–0.75)
MAGNESIUM SERPL-MCNC: 1.7 MG/DL (ref 1.5–2.5)
PREALB SERPL-MCNC: 9 MG/DL (ref 18–38)

## 2019-08-12 PROCEDURE — 99232 SBSQ HOSP IP/OBS MODERATE 35: CPT | Performed by: INTERNAL MEDICINE

## 2019-08-12 PROCEDURE — 36415 COLL VENOUS BLD VENIPUNCTURE: CPT

## 2019-08-12 PROCEDURE — 83735 ASSAY OF MAGNESIUM: CPT

## 2019-08-12 PROCEDURE — 700102 HCHG RX REV CODE 250 W/ 637 OVERRIDE(OP): Performed by: HOSPITALIST

## 2019-08-12 PROCEDURE — 74018 RADEX ABDOMEN 1 VIEW: CPT

## 2019-08-12 PROCEDURE — 770006 HCHG ROOM/CARE - MED/SURG/GYN SEMI*

## 2019-08-12 PROCEDURE — A9270 NON-COVERED ITEM OR SERVICE: HCPCS | Performed by: INTERNAL MEDICINE

## 2019-08-12 PROCEDURE — A9270 NON-COVERED ITEM OR SERVICE: HCPCS | Performed by: HOSPITALIST

## 2019-08-12 PROCEDURE — 700105 HCHG RX REV CODE 258: Performed by: INTERNAL MEDICINE

## 2019-08-12 PROCEDURE — 99232 SBSQ HOSP IP/OBS MODERATE 35: CPT | Performed by: NURSE PRACTITIONER

## 2019-08-12 PROCEDURE — 84134 ASSAY OF PREALBUMIN: CPT

## 2019-08-12 PROCEDURE — 770020 HCHG ROOM/CARE - TELE (206)

## 2019-08-12 PROCEDURE — 86140 C-REACTIVE PROTEIN: CPT

## 2019-08-12 PROCEDURE — 700102 HCHG RX REV CODE 250 W/ 637 OVERRIDE(OP): Performed by: INTERNAL MEDICINE

## 2019-08-12 RX ORDER — IPRATROPIUM BROMIDE AND ALBUTEROL SULFATE 2.5; .5 MG/3ML; MG/3ML
3 SOLUTION RESPIRATORY (INHALATION)
Status: DISCONTINUED | OUTPATIENT
Start: 2019-08-12 | End: 2019-08-28 | Stop reason: HOSPADM

## 2019-08-12 RX ORDER — ALBUTEROL SULFATE 90 UG/1
2 AEROSOL, METERED RESPIRATORY (INHALATION) EVERY 4 HOURS PRN
Status: DISCONTINUED | OUTPATIENT
Start: 2019-08-12 | End: 2019-08-28 | Stop reason: HOSPADM

## 2019-08-12 RX ADMIN — LEVOTHYROXINE SODIUM 25 MCG: 25 TABLET ORAL at 05:54

## 2019-08-12 RX ADMIN — SODIUM CHLORIDE: 9 INJECTION, SOLUTION INTRAVENOUS at 12:06

## 2019-08-12 RX ADMIN — ALBUTEROL SULFATE 2 PUFF: 90 AEROSOL, METERED RESPIRATORY (INHALATION) at 04:30

## 2019-08-12 RX ADMIN — ASPIRIN 81 MG 81 MG: 81 TABLET ORAL at 05:54

## 2019-08-12 ASSESSMENT — LIFESTYLE VARIABLES: EVER_SMOKED: NEVER

## 2019-08-12 NOTE — PALLIATIVE CARE
Palliative Care follow-up  Called and spoke with ps son, Mikael. Mikael is currently staying in town until approx Wednesday. Arranged  To meet later toady at 14:00 at the bedside with MD for further discussion of POC/GOC.     Updated: Dr Fuentes, BS RN    Plan: Meet with pts son, Michael, for family meeting at 14:00 today.     Thank you for allowing Palliative Care to participate in this patient's care. Please feel free to call [phone] with any questions or concerns.

## 2019-08-12 NOTE — DISCHARGE PLANNING
Agency/Facility Name: [facility]  Plan or Request: Hospice order completed. Referral sent to [facility].

## 2019-08-12 NOTE — PROGRESS NOTES
Brief neurology progress note     Patient seen and examined.  Awake and following commands.Speaking in full sentences.  Denies any new neurological complaints.    Exam   General: Patient in no acute distress, pleasant and cooperative.  HEENT: Normocephalic, no signs of acute trauma.   Neck: supple, no meningeal signs or carotid bruits. There is normal range of motion. No tenderness on exam.   Chest: clear to auscultation. No cough.   CV: RRR, no murmurs.   Skin: no signs of acute rashes or trauma.   Musculoskeletal: joints exhibit full range of motion, without any pain to palpation. There are no signs of joint or muscle swelling. There is no tenderness to deep palpation of muscles.   Psychiatric: No hallucinatory behavior. Denies symptoms of depression or suicidal ideation. Mood and affect appear normal on exam.      NEUROLOGICAL EXAM:   Mental status, orientation: Drowsy, arousable, oriented to self and place.   Speech and language: Minimal verbal output, mildly dysarthric. Follow simple commands, more briskly to Right.   Cranial nerve exam: Pupils are 3-4 mm bilaterally and equally reactive to light and accommodation. Visual fields are intact by confrontation. Patient has Right gaze preference Left facial weakness appreciated. Corneal reflexes decreased to the left. Tongue is midline and without any signs of tongue biting or fasciculations.  Shoulder shrug is intact bilaterally.   Motor exam: Strength is 5/5 in RUE/RLE. 1/5 to LUE, 2/5 to LLE. Increased tone to LLE, otherwise tone is normal. No abnormal movements were seen on exam.   Sensory exam RUE/RLE reveals normal sense of light touch and pinprick; diminished to the Left.  Deep tendon reflexes:  2+ throughout. Plantar responses are flexor on the Right, upgoing on the Left (positive babinski). There is no clonus.   Coordination: RUE shows a normal finger-nose-finger. LUE unable.   Gait: Not assessed at this time as patient is a fall risk.         Impression:  Right MCA bifurcation thrombus, s/p thrombectomy on 8/8/19 (TICI 3).  R MCA with hemorrhagic transformation   Dislipidemia.   Hypertension.   Hx of CAD.   EKG NSR      Recommendations/Plan:   - BP goal <140   - OK to start ASA 81 mg daily given MCA stenosis   - May need dual antiplatelets in the future   - Repeat CT head reviewed and stable   -TTE done I- severely dilated atrium EF 65%   -  will need ARUN   -Atorvastatin 80 mg PO/NGT q HS. Goal LDL <70 and HDL>50   -Recommend aggressive BG management per primary team, Avoid IVF with Dextrose. BG goal normotension.  -PT/OT/SLP eval and treat. Physiatry consult.   -Will follow up with results of the above and make additional recommendations accordingly.   -DVT Prophylaxis: SCDs.      Plan:  Plan discussed with consulting physician and patient's nurse.      Shelbi Merino MD PhD   Board Certified Neurologist

## 2019-08-12 NOTE — PROGRESS NOTES
Monitor summary: SR/ST , MI 0.16, QRS 0.04, QT 0.28,  frequent PVCs, rare Bigeminy and couplet PVCs, Frequent PACs,  pause per strip from monitor room.

## 2019-08-12 NOTE — PALLIATIVE CARE
"Palliative Care follow-up  Met with pts son, Mikael and Dr Fuentes at the bedside. Spoke with Mikael about pts current condition and prognosis. Spoke about the pts AD and quality of life and what Mikael felt the pt would want.   Mikael acknowledged that she would not wish to live in a \"nursing home\". Spoke about the pts expressed wishes yesterday when she was much more alert and communicative. Mikael also stated that when he was with her last night she was much more alert and interactive.     Spoke about option of Hospice. Mikael concerned about level of care that may require with hospice. Spoke about how the pt will most likely require full assist for the forseeable future regardless of level of care choice. That pt may require long term care, vs hospice.     Mikael spoke about having conversations on care wishes with his mother. At one point stated, \"lets do this\", meaning go to skilled and have TFs.   Spoke further on her expressed wishes for no long term TFs in her AD. Mikael then acknowledged that she would not wish to go a skilled for therapies, and that maybe Hospice was the right level of care.     Explained that Medicare covers SNF with therapies, not long term care. Explained that pt would need to be applied for [place] Medicaid for long term care in SNF.  Spoke on if pt would want to live in the SNF long term. Mikael acknowledged that she would not, Mikael spoke about \"Not standing in the way if it's her time\".     Again spoke about hospice and what support they can provide, such as needed DME, medications, MD oversight with RN and CNA visits.   Mikael agreed to speak with Hospice. Was able to have [facility] choice form, Mikael chose to speak with [facility].   Provided with contact number for Palliative Team. Explained that [facility] will call him and arrange a time to meet and discuss his mothers care.     Choice form faxed to [facility] at fax #[phone].     Updated: BS RN, " FLAKO RN    Plan: Referral sent to [facility]     Thank you for allowing Palliative Care to participate in this patient's care. Please feel free to call [phone] with any questions or concerns.

## 2019-08-12 NOTE — PROGRESS NOTES
2330 RN entered room to find cortrak had been removed by pt. Pt noted to be more confused than at beginning of shift. Known dementia. Cortrak to be replaced. Order for bridle and renewed soft wrist restraints obtained.

## 2019-08-12 NOTE — ED NOTES
Final urine culture from er visit 8-8 has been resulted. Pt is still at [facility] room 198. I called and faxd results to jaye patrick and she will notify md

## 2019-08-12 NOTE — CARE PLAN
Problem: Safety  Goal: Will remain free from falls  Outcome: PROGRESSING AS EXPECTED  Note:   Call light within reach. Bed locked and in lowest position. Bed alarm on. Room near nurses station. Rounding maintained.      Problem: Venous Thromboembolism (VTW)/Deep Vein Thrombosis (DVT) Prevention:  Goal: Patient will participate in Venous Thrombosis (VTE)/Deep Vein Thrombosis (DVT)Prevention Measures  Outcome: PROGRESSING AS EXPECTED  Flowsheets (Taken 8/12/2019 0035)  Mechanical Prophylaxis : SCDs, Sequential Compression Device  SCDs, Sequential Compression Device: On     Problem: Bowel/Gastric:  Goal: Normal bowel function is maintained or improved  Outcome: PROGRESSING AS EXPECTED  Note:   Pt incontinent. Multiple BMs overnight.

## 2019-08-12 NOTE — CARE PLAN
Problem: Communication  Goal: The ability to communicate needs accurately and effectively will improve  Outcome: PROGRESSING SLOWER THAN EXPECTED  Intervention: [place] patient and significant other/support system to call light to alert staff of needs  Note:   Pt. Continues with expressive aphasia.      Problem: Safety - Medical Restraint  Goal: Free from restraint(s) (Restraint for Interference with Medical Device)  Description  INTERVENTIONS:  1. ONCE/SHIFT or MINIMUM Q12H: Assess and document the continuing need for restraints  2. Q24H: Continued use of restraint requires LIP to perform face to face examination and written order  3. Identify and implement measures to help patient regain control  Outcome: PROGRESSING SLOWER THAN EXPECTED   Pt. Currently in soft wrist restraints. Pt. Removed Cortrak last night.

## 2019-08-12 NOTE — CARE PLAN
Problem: Nutritional:  Goal: Nutrition support tolerated and meeting greater than 85% of estimated needs  Outcome: PROGRESSING SLOWER THAN EXPECTED  Note:   Noted pt removed cortrak on 8/11; cortrak replaced this am and TF of Replete Fiber running @ 25 mL/hr. Anticipate advancement to goal rate of 50 mL/hr per protocol.

## 2019-08-12 NOTE — PROGRESS NOTES
2 RN skin check completed with OMID Bah    Bruising and scabbing to L face.    Cortrack site CDI.     Bruising to upper chest.     Scattered bruising and abrasions to all extremities.    R groin site bruising, dressing CDI.    L hand skin tear- mepi-lite and mepilex in place.    Sacrum intact- preventative mepilex in place.    Excoriation to bilateral groins, perineum, and buttocks- barrier cream used.      Waffle mattress overlay in place.    Pt turned q2h.

## 2019-08-12 NOTE — PROGRESS NOTES
2 RN skin check completed with Racquel ANNE.   Bruising and scabbing to left side of face  Multiple bruises to Left thigh, abdomen, upper chest, BUE and LLE.   Right groin site with bruising and CDD.   Sacrum pink and blanchable with mepilex in place.   Excoriation to [place]-area - barrier cream applied.   Skin tear to left hand with mepilex lite in place.   Waffle overlay in use.  Q2 hour turns in place.  Left foot drop boot in use.

## 2019-08-12 NOTE — PROGRESS NOTES
Chief Complaint   Patient presents with   • Possible Stroke     Last known well 1030PM on 8/7/19       Problem List Items Addressed This Visit     None      Visit Diagnoses     Acute CVA (cerebrovascular accident) (HCC)        Relevant Medications    atorvastatin (LIPITOR) 10 MG Tab    enalaprilat (VASOTEC) injection 1.25 mg    atorvastatin (LIPITOR) tablet 40 mg    labetalol (NORMODYNE,TRANDATE) injection 10 mg      Neurology Stroke Progress Note    Brief History of present illness:  88-year old female with PMhx significant for CAD, dyslipidemia, anxiety/depression who presented to [facility] as a transfer from [facility] on 8/8/19 for a chief complaint of Left sided weakness and Right gaze preference; patient initially presented to [facility] on 8/7/19 for reports of generalized weakness; unclear if patient had focal deficits at that time however NIHSS documented as 0 and CT head at that time unremarkable. Patient was thought to have dehydration at that time. At any rate, she was sent home; went to sleep that evening, and was found at 1130 the following morning (on 8/8) with LUE/LLE flaccid, Left hemineglect, and Right gaze. Presented again to [facility], found to have Right MCA occlusion per CTA, and was ultimately transferred here for IR thrombectomy. Now s/p Right MCA thrombectomy with TICI 3.     Interval, 8/12/19:   Patient laying in bed; Arousable to voice. Patient now transferred to/managed on RNF. No events overnight.     No changes to HPI as was documented on 8/8/19.     Past medical history:   Past Medical History:   Diagnosis Date   • Advanced directives, counseling/discussion 2012    AND/DNR   • Allergic rhinitis    • ASTHMA    • CAD (coronary artery disease)    • Carpal tunnel syndrome     s/p bilateral surgical release   • Cataracts, bilateral     s/p surgery   • Cervical spine pain    • Chest wall contusion 5/31/2013   • Chronic low back pain    • Degenerative arthritis of spine    •  "Dementia     MMSE 17/30 (5/15/2012)   • Depression with anxiety    • Diarrhea    • Diverticulitis    • Dizziness 3/2012    hospitalization:  mastoiditis vs. vertigo vs. narcolepsy vs. syncope vs. seizure   • Dry eye syndrome    • Dry mouth    • Dyslipidemia 2/9/2012    , TG 64, HDL 49, nonHDL 205, , TC/HDL 5.18 (2/9/2012)   • Falls frequently     ambulates with cane, power chair 5/2012   • Foot fracture, right 2011   • Fracture of metacarpal of right hand, closed 5/31/2013    4th and 5th metacarpal fx   • Gallstones     s/p cholecystectomy   • GERD (gastroesophageal reflux disease)     on PPI, followed by GI   • H. pylori infection    • H/O colonoscopy 2005, 2008    microscopic colitis, diverticulosis, hemorhoids (2005).  internal hemorrhoids (2008)   • Internal hemorrhoids    • Laceration of head 5/31/2013   • Microscopic colitis     on asacol, followed by GI   • Migraine     topamax   • OSTEOPOROSIS     followed by Dr. Lal   • overactive bladder    • PUD (peptic ulcer disease)    • Radiculopathy    • Renal insufficiency 11/26/2012    SCr 0.85 (.6-1.1), GFR 64 (>64) on 11/26/2012.  followed by Dr. Lal   • Rotator cuff tear     s/p bilateral surgical repair   • Scoliosis    • Spinal stenosis    • Spondylolisthesis    • Subdural hematoma (HCC) 5/30/2012    hospitalization, d/t fall, +UTI; neurosurgery f/u and CT (7/13/2012):  CT \"shows near complete resolution of previous left SDH\"   • Thyroid nodule     stable thyroid nodule 2011; followed by Dr. Lal   • URINARY INCONTINENCE    • Vitamin d deficiency 11/26/2012    vitamin D 25-OH serum total 33 (11/26/2012).  followed by Dr. Lal       Past surgical history:   Past Surgical History:   Procedure Laterality Date   • HYSTERECTOMY, TOTAL ABDOMINAL  1998   • KNEE REPLACEMENT, TOTAL  1993    left   • HIATAL HERNIA REPAIR  1993   • ARTHROSCOPY, KNEE      right   • BLADDER SLING FEMALE     • CARPAL TUNNEL RELEASE      bilateral   • CATARACT " "EXTRACTION WITH IOL     • CHOLECYSTECTOMY     • OTHER ORTHOPEDIC SURGERY      hammertoe   • ROTATOR CUFF REPAIR      bilateral   • TRIGGER FINGER RELEASE      x3       Family history:   Family History   Problem Relation Age of Onset   • GI Disease Father         \"stomach issues\"   • Diabetes Father    • GI Disease Son         Crohn's       Social history:   Social History     Socioeconomic History   • Marital status:      Spouse name: Not on file   • Number of children: Not on file   • Years of education: Not on file   • Highest education level: Not on file   Occupational History   • Not on file   Social Needs   • Financial resource strain: Not on file   • Food insecurity:     Worry: Not on file     Inability: Not on file   • Transportation needs:     Medical: Not on file     Non-medical: Not on file   Tobacco Use   • Smoking status: Never Smoker   • Smokeless tobacco: Never Used   Substance and Sexual Activity   • Alcohol use: Yes     Comment: occasional wine   • Drug use: No   • Sexual activity: Never   Lifestyle   • Physical activity:     Days per week: Not on file     Minutes per session: Not on file   • Stress: Not on file   Relationships   • Social connections:     Talks on phone: Not on file     Gets together: Not on file     Attends Taoist service: Not on file     Active member of club or organization: Not on file     Attends meetings of clubs or organizations: Not on file     Relationship status: Not on file   • Intimate partner violence:     Fear of current or ex partner: Not on file     Emotionally abused: Not on file     Physically abused: Not on file     Forced sexual activity: Not on file   Other Topics Concern   • Not on file   Social History Narrative   • Not on file       Current medications:   Current Facility-Administered Medications   Medication Dose   • ipratropium-albuterol (DUONEB) nebulizer solution  3 mL   • Respiratory Care per Protocol     • albuterol inhaler 2 Puff  2 Puff   • " aspirin (ASA) chewable tab 81 mg  81 mg   • Pharmacy Consult: Enteral tube insertion - review meds/change route/product selection  1 Each   • senna-docusate (PERICOLACE or SENOKOT S) 8.6-50 MG per tablet 2 Tab  2 Tab    And   • polyethylene glycol/lytes (MIRALAX) PACKET 1 Packet  1 Packet    And   • magnesium hydroxide (MILK OF MAGNESIA) suspension 30 mL  30 mL    And   • bisacodyl (DULCOLAX) suppository 10 mg  10 mg   • ondansetron (ZOFRAN ODT) dispertab 4 mg  4 mg   • atorvastatin (LIPITOR) tablet 40 mg  40 mg   • levothyroxine (SYNTHROID) tablet 25 mcg  25 mcg   • acetaminophen (TYLENOL) tablet 650 mg  650 mg   • donepezil (ARICEPT) tablet 10 mg  10 mg   • labetalol (NORMODYNE,TRANDATE) injection 10 mg  10 mg   • NS infusion     • enalaprilat (VASOTEC) injection 1.25 mg  1.25 mg   • ondansetron (ZOFRAN) syringe/vial injection 4 mg  4 mg       Medication Allergy:  Allergies   Allergen Reactions   • Donepezil    • Pcn [Penicillins]      Hives  Per patient has taken Keflex without any reaction in the past.       Review of systems:   As noted above; otherwise limited as patient is currently altered/lethargic.     Physical examination:   Vitals:    08/12/19 0000 08/12/19 0251 08/12/19 0400 08/12/19 0800   BP: 146/69  145/63 146/70   Pulse: 81 81 78 84   Resp: 16 16 16 17   Temp: 36 °C (96.8 °F)  37.6 °C (99.6 °F) 36.1 °C (97 °F)   TempSrc: Temporal  Temporal Temporal   SpO2: 94%  94% 94%   Weight:       Height:         General: Patient in no acute distress, pleasant and cooperative.  HEENT: Normocephalic, no signs of acute trauma.   Neck: supple, no meningeal signs or carotid bruits. There is normal range of motion. No tenderness on exam.   Chest: clear to auscultation. No cough.   CV: RRR, no murmurs.   Skin: no signs of acute rashes or trauma.   Musculoskeletal: joints exhibit full range of motion, without any pain to palpation. There are no signs of joint or muscle swelling. There is no tenderness to deep palpation  of muscles.   Psychiatric: No hallucinatory behavior. Denies symptoms of depression or suicidal ideation. Mood and affect appear normal on exam.     NEUROLOGICAL EXAM:   Mental status, orientation:  arousable, oriented to self and place.   Speech and language: Some/Minimal verbal output, mildly dysarthric. Follow simple commands, more briskly to Right.   Cranial nerve exam: Pupils are 3-4 mm bilaterally and equally reactive to light and accommodation. Visual fields are intact by confrontation. Patient has Right gaze preference Left facial weakness appreciated. Corneal reflexes decreased to the left. Tongue is midline and without any signs of tongue biting or fasciculations.  Shoulder shrug is intact bilaterally.   Motor exam: Strength is 5/5 in RUE/RLE. 2/5 to LUE, 2/5 to LLE. Increased tone to LLE, otherwise tone is normal. No abnormal movements were seen on exam.   Sensory exam RUE/RLE reveals normal sense of light touch and pinprick; diminished to the Left.  Deep tendon reflexes:  2+ throughout. Plantar responses are flexor on the Right, upgoing on the Left (positive babinski). There is no clonus.   Coordination: RUE shows a normal finger-nose-finger. LUE unable.   Gait: Not assessed at this time as patient is a fall risk.     No significant changes to exam as was previously documented by me on 8/9/19.     NIH Stroke Scale    1a Level of Consciousness  1  1b Orientation Questions 1  1c Response to Commands   2 Gaze 1  3 Visual Fields   4 Facial Movement 1  5 Motor Function (arm)   a Left 3  b Right   6 Motor Function (leg)   a Left  2  b Right   7 Limb Ataxia   8 Sensory 1  9 Language   10 Articulation 1  11 Extinction/Inattention 1    Score: 12      ANCILLARY DATA REVIEWED:     Lab Data Review:  Recent Results (from the past 24 hour(s))   MAGNESIUM    Collection Time: 08/12/19  5:12 AM   Result Value Ref Range    Magnesium 1.7 1.5 - 2.5 mg/dL   CRP QUANTITIVE (NON-CARDIAC)    Collection Time: 08/12/19  5:12 AM    Result Value Ref Range    Stat C-Reactive Protein 1.50 (H) 0.00 - 0.75 mg/dL   PREALBUMIN    Collection Time: 08/12/19  5:12 AM   Result Value Ref Range    Pre-Albumin 9.0 (L) 18.0 - 38.0 mg/dL       Labs reviewed by me.     Records reviewed:   Reviewed records from [facility].       Imaging reviewed by me:     [id number] VIEW   Final Result         1.  Nonspecific bowel gas pattern.   2.  Dobbhoff tube tip terminates overlying the expected location of the gastric antrum.      CT-HEAD W/O   Final Result      1.  Acute right MCA infarcts with hemorrhagic transformation seen in the right temporal lobe. No significant change from prior brain MRI given differences in technique.   2.  Atrophy and chronic microvascular ischemic type changes.      EC-ECHOCARDIOGRAM COMPLETE W/O CONT   Final Result      [id number] VIEW   Final Result         Feeding tube with tip projecting over the expected area of the distal stomach.      DX-ABDOMEN FOR TUBE PLACEMENT   Final Result      Feeding tube tip projects over the distal stomach or duodenal bulb.      DX-ABDOMEN FOR TUBE PLACEMENT   Final Result      1.  Feeding tube tip projects near the GE junction.      MR-BRAIN-W/O   Final Result      1.  Moderate-sized acute right MCA territory infarct with hemorrhagic transformation.   2.  Moderate diffuse cervical substance loss.   3.  Advanced microangiopathic ischemic change versus demyelination or gliosis.   4.  Chronic ischemic pontine gliosis.   5.  Findings of pansinusitis.      These findings were discussed with MORIS CANTU on 8/9/2019 2:50 PM. These findings were discussed with Dr. Mahendra Mann for Dr. MORIS CANTU on 8/9/2019 2:56 PM.      DX-CHEST-PORTABLE (1 VIEW)   Final Result      1.  No acute cardiopulmonary disease.   2.  Contrast present within mildly dilated LEFT renal collecting system.      IR-THROMBO MECHANICAL ARTERY,INIT   Final Result      1.  Occlusion of the right middle cerebral artery, M2 segment.      2.   Successful cerebral thrombectomy performed with post intervention angiogram demonstrating patent right middle cerebral artery vessels.      Findings were discussed with Dr. Miramontes at approximately 1755 hrs.      OUTSIDE IMAGES-CT HEAD   Final Result      OUTSIDE IMAGES-CT HEAD   Final Result      CT-CTA NECK WITH & W/O-POST PROCESSING   Final Result      Moderate calcified plaque right carotid artery bifurcation. No significant stenosis.   Left carotid arteries appear patent.   Vertebral arteries appear patent.   Focal enlargement of right lingual tonsils measuring 12.6 mm in diameter. Patient's condition permits, would consider further evaluation.      CT-CEREBRAL PERFUSION ANALYSIS   Final Result      1.  Cerebral blood flow less than 30% likely representing completed infarct = 38 mL.      2.  T Max more than 6 seconds likely representing combination of completed infarct and ischemia = 98 mL.      3.  Mismatched volume likely representing ischemic brain/penumbra = 60 mL      4.  Please note that the cerebral perfusion was performed on the limited brain tissue around the basal ganglia region. Infarct/ischemia outside the CT perfusion sections can be missed in this study.      CT-CTA HEAD WITH & W/O-POST PROCESS    (Results Pending)       Presumed mechanism by TOAST:  __Large Artery Atherosclerosis  __Small Vessel (Lacunar)  _X_Cardioembolic  __Other (Sickle Cell, Vasculitis, Hypercoagulable)  __Unknown       ASSESSMENT AND PLAN:  88-year old female with PMhx significant for CAD, dyslipidemia, anxiety/depression who presented to [facility] as a transfer from [facility] on 8/8/19 for a chief complaint of Left sided weakness and Right gaze preference; not a candidate for IV tPA secondary to presentation time greater than 4.5 hours from time of symptom onset. [facility] revealed Right MCA occlusion, prompting immediate transfer to [facility] for thrombectomy. Patient now s/p IR thrombectomy of Right MCA  occlusion with TICI 3. NIHSS is currently 12-13; patient now transferred from ICU to [facility].  Of note, patient's TTE revealed normal EF though note severely dilated left atrium, a finding generally consistent with Afib. Though not grounds to initiate anticoagulation, in this setting will recommend aggressive cardiology/arrhythmia work up with plan for outpatient cardiology follow up after discharge.     Impression:  Right MCA bifurcation thrombus, s/p thrombectomy on 8/8/19 (TICI 3).  Large Right MCA territory ischemic stroke with mild associated hemorrhagic conversion (secondary to the above).   Dyslipidemia.   Hypertension.   Hx of CAD.     Recommendations/Plan:     -q4h and PRN neuro assessment. VS per nursing/unit protocol. BP goal 140/90.    -Telemetry; currently SR with ectopy. Screen for Afib/arrhtyhmia. As was noted above, patient found to have TTE with normal EF; noted severely dilated Left atrium, a finding consistent with Afib.  No documented Afib since time of presentation here. In this setting will continue to recommend aggressive cardiology/arrhythmia work up with plan for outpatient cardiology follow up after discharge.  -OK to initiate ASA 81 mg PO q day today (note repeat CT head on 8/10 stable/unchanged)  -Atorvastatin 40 mg PO/NGT q HS. Note LDL 53.  -Recommend aggressive BG management per primary team, Avoid IVF with Dextrose. BG goal 140-180. Note A1c is 5.9.   -Continue PT/OT/SLP. Physiatry consult. Case management for dispo.   -DVT Prophylaxis: SCDs.     The plan of care above has been discussed with Dr. Mejia.     Cheryl Gracia MSN, BSN, AGNP-C  [facility]

## 2019-08-13 ENCOUNTER — APPOINTMENT (OUTPATIENT)
Dept: RADIOLOGY | Facility: MEDICAL CENTER | Age: 84
DRG: 023 | End: 2019-08-13
Attending: INTERNAL MEDICINE
Payer: MEDICARE

## 2019-08-13 ENCOUNTER — HOME CARE VISIT (OUTPATIENT)
Dept: HOSPICE | Facility: HOSPICE | Age: 84
End: 2019-08-13
Payer: MEDICARE

## 2019-08-13 LAB
ALBUMIN SERPL BCP-MCNC: 3 G/DL (ref 3.2–4.9)
ALBUMIN/GLOB SERPL: 1 G/DL
ALP SERPL-CCNC: 45 U/L (ref 30–99)
ALT SERPL-CCNC: 13 U/L (ref 2–50)
ANION GAP SERPL CALC-SCNC: 5 MMOL/L (ref 0–11.9)
AST SERPL-CCNC: 19 U/L (ref 12–45)
BASOPHILS # BLD AUTO: 0.3 % (ref 0–1.8)
BASOPHILS # BLD: 0.03 K/UL (ref 0–0.12)
BILIRUB SERPL-MCNC: 0.7 MG/DL (ref 0.1–1.5)
BUN SERPL-MCNC: 8 MG/DL (ref 8–22)
CALCIUM SERPL-MCNC: 8 MG/DL (ref 8.5–10.5)
CHLORIDE SERPL-SCNC: 107 MMOL/L (ref 96–112)
CO2 SERPL-SCNC: 26 MMOL/L (ref 20–33)
CREAT SERPL-MCNC: 0.53 MG/DL (ref 0.5–1.4)
EOSINOPHIL # BLD AUTO: 0.25 K/UL (ref 0–0.51)
EOSINOPHIL NFR BLD: 2.4 % (ref 0–6.9)
ERYTHROCYTE [DISTWIDTH] IN BLOOD BY AUTOMATED COUNT: 43.5 FL (ref 35.9–50)
GLOBULIN SER CALC-MCNC: 2.9 G/DL (ref 1.9–3.5)
GLUCOSE SERPL-MCNC: 90 MG/DL (ref 65–99)
HCT VFR BLD AUTO: 32.6 % (ref 37–47)
HGB BLD-MCNC: 10.8 G/DL (ref 12–16)
IMM GRANULOCYTES # BLD AUTO: 0.08 K/UL (ref 0–0.11)
IMM GRANULOCYTES NFR BLD AUTO: 0.8 % (ref 0–0.9)
LYMPHOCYTES # BLD AUTO: 2.09 K/UL (ref 1–4.8)
LYMPHOCYTES NFR BLD: 19.8 % (ref 22–41)
MAGNESIUM SERPL-MCNC: 1.7 MG/DL (ref 1.5–2.5)
MCH RBC QN AUTO: 31.8 PG (ref 27–33)
MCHC RBC AUTO-ENTMCNC: 33.1 G/DL (ref 33.6–35)
MCV RBC AUTO: 95.9 FL (ref 81.4–97.8)
MONOCYTES # BLD AUTO: 0.88 K/UL (ref 0–0.85)
MONOCYTES NFR BLD AUTO: 8.3 % (ref 0–13.4)
NEUTROPHILS # BLD AUTO: 7.24 K/UL (ref 2–7.15)
NEUTROPHILS NFR BLD: 68.4 % (ref 44–72)
NRBC # BLD AUTO: 0 K/UL
NRBC BLD-RTO: 0 /100 WBC
PLATELET # BLD AUTO: 206 K/UL (ref 164–446)
PMV BLD AUTO: 9.4 FL (ref 9–12.9)
POTASSIUM SERPL-SCNC: 3.8 MMOL/L (ref 3.6–5.5)
PROT SERPL-MCNC: 5.9 G/DL (ref 6–8.2)
RBC # BLD AUTO: 3.4 M/UL (ref 4.2–5.4)
SODIUM SERPL-SCNC: 138 MMOL/L (ref 135–145)
WBC # BLD AUTO: 10.6 K/UL (ref 4.8–10.8)

## 2019-08-13 PROCEDURE — 700102 HCHG RX REV CODE 250 W/ 637 OVERRIDE(OP): Performed by: HOSPITALIST

## 2019-08-13 PROCEDURE — 36415 COLL VENOUS BLD VENIPUNCTURE: CPT

## 2019-08-13 PROCEDURE — 80053 COMPREHEN METABOLIC PANEL: CPT

## 2019-08-13 PROCEDURE — 85025 COMPLETE CBC W/AUTO DIFF WBC: CPT

## 2019-08-13 PROCEDURE — 700105 HCHG RX REV CODE 258: Performed by: INTERNAL MEDICINE

## 2019-08-13 PROCEDURE — 74018 RADEX ABDOMEN 1 VIEW: CPT

## 2019-08-13 PROCEDURE — 770020 HCHG ROOM/CARE - TELE (206)

## 2019-08-13 PROCEDURE — 770006 HCHG ROOM/CARE - MED/SURG/GYN SEMI*

## 2019-08-13 PROCEDURE — 99233 SBSQ HOSP IP/OBS HIGH 50: CPT | Performed by: INTERNAL MEDICINE

## 2019-08-13 PROCEDURE — 83735 ASSAY OF MAGNESIUM: CPT

## 2019-08-13 PROCEDURE — A9270 NON-COVERED ITEM OR SERVICE: HCPCS | Performed by: HOSPITALIST

## 2019-08-13 PROCEDURE — 700102 HCHG RX REV CODE 250 W/ 637 OVERRIDE(OP): Performed by: INTERNAL MEDICINE

## 2019-08-13 PROCEDURE — 92526 ORAL FUNCTION THERAPY: CPT

## 2019-08-13 PROCEDURE — A9270 NON-COVERED ITEM OR SERVICE: HCPCS | Performed by: INTERNAL MEDICINE

## 2019-08-13 RX ADMIN — SODIUM CHLORIDE: 9 INJECTION, SOLUTION INTRAVENOUS at 16:59

## 2019-08-13 RX ADMIN — LEVOTHYROXINE SODIUM 25 MCG: 25 TABLET ORAL at 05:11

## 2019-08-13 RX ADMIN — ASPIRIN 81 MG 81 MG: 81 TABLET ORAL at 05:11

## 2019-08-13 RX ADMIN — ACETAMINOPHEN 650 MG: 325 TABLET, FILM COATED ORAL at 19:41

## 2019-08-13 RX ADMIN — DONEPEZIL HYDROCHLORIDE 10 MG: 5 TABLET, FILM COATED ORAL at 16:59

## 2019-08-13 RX ADMIN — ATORVASTATIN CALCIUM 40 MG: 40 TABLET, FILM COATED ORAL at 19:41

## 2019-08-13 NOTE — PROGRESS NOTES
Monitor summary: SR 77-88, LA 0.12, QRS 0.08, QT 0.36, rare PACs, rare PVCs and rare couplet PVCs. per strip from monitor room.

## 2019-08-13 NOTE — THERAPY
Speech Language Therapy dysphagia treatment completed.     Functional Status: Pt was seen at bedside for f/u dysphagia tx session. Per RN, Pt is likely transitioning to hospice; however, this has not been officially decided yet. Per chart review, Pt's POLST indicates only hydration via TF's (therefore, nutrition via TF's is not within her goals/wishes.) Pt currently NPO with TF's via Cortrak. Upon arrival to Pt's room, Pt found asleep in bed with head leaning towards R-side. Pt awoke with verbal and tactile cues; however, demonstrated difficulties staying awake/alert without multiple cues/prompts.     Pt was provided with PO trials of ice chips, thins (tsps, cup sips), NTL (tsps, cup sips) and pudding. Pt demonstrated bolus holding for ice chips and delayed swallow initiation ~3-8 seconds; however, no clinical s/sx of aspiration/penetration were noted. Pt consumed tsps and cup sips of thins, and again demonstrated bolus holding and delayed swallow initiation and had overt coughing with 20% of cup sips, which is concerning for possible aspiration/penetration. Pt demonstrated improved tolerance with NTL via tsps and cup sips, and swallow initiation appeared less delayed (~2-5 seconds comparatively). Pt then consumed small bites of pudding (she refused bites of applesauce) and demonstrated prolonged bolus holding and required multiple cues to swallow; however, no clinical s/sx of aspiration/penetration were appreciated. Pt appears to be at elevated risk for aspiration secondary to ALOC and today's findings, and recommend to con't NPO status with primary nutrition/hydration via TF's; however, given Pt's POLST and likely transition to hospice, the safest diet to reduce but not eliminate aspiration risk would be Dysphagia 1 solids, Nectar-thick liquids and meds crushed in puree. RN notified and aware. SLP following as appropriate given POC.    Recommendations:   1) Con't NPO status with primary nutrition/hydration via  "TF's; however, given Pt's POLST and likely transition to hospice, the safest diet to reduce but not eliminate aspiration risk would be Dysphagia 1 solids, Nectar-thick liquids and meds crushed in puree (will defer to MD and Pt/family)     Plan of Care: Will benefit from Speech Therapy 5 times per week  Post-Acute Therapy: Recommend inpatient transitional care services for continued speech therapy services.      See \"Rehab Therapy-Acute\" Patient Summary Report for complete documentation.     "

## 2019-08-13 NOTE — PROGRESS NOTES
2 RN skin check completed with OMID Liriano     Bruising and scabbing to L face.     Bruising to upper chest.     Scattered bruising and abrasions to all extremities.     R groin site bruising, dressing CDI.     L hand skin tear- mepi-lite and mepilex in place.     Sacrum intact- preventative mepilex in place.     Excoriation to bilateral groins, perineum, and buttocks- barrier cream used.      Waffle mattress overlay in place.     Pt turned q2h.

## 2019-08-13 NOTE — PROGRESS NOTES
Monitor summary: SR 74-89, MS .14, QRS .08, QT .32, with frequent PVCs per strip from monitor room.

## 2019-08-13 NOTE — WOUND TEAM
[facility]  Inpatient Services  Initial Wound and Skin Care Evaluation    Admission Date: 8/8/2019     Consult Date: 8/10/19   HPI, PMH, SH: Reviewed    Unit where seen by Wound Team: [id number]     WOUND CONSULT RELATED TO:  Evaluation of multiple wounds     SUBJECTIVE:  Mumbling; very sedated      Self Report / Pain Level:  Seems in no distress with position changes       OBJECTIVE:  Resolving bruising on left side of back and buttocks; dried skin tear left side of face and mild IAD on buttocks.  Both knees are WNL without reddened areas noted upon admission.  Skin tear dorsum left hand only treatable wound noted    WOUND TYPE, LOCATION, CHARACTERISTICS (Pressure Injuries: location, stage, POA or date identified)        Wound 08/13/19 Skin Tear Hand skin tear left hand (Active)   Wound Image   8/13/2019  3:00 PM   Site Assessment Clean;Drainage;Intact;Red 8/13/2019  3:00 PM   Anjelica-wound Assessment Clean;Intact;Fragile 8/13/2019  3:00 PM   Margins Attached edges 8/13/2019  3:00 PM   Wound Length (cm) 4 cm 8/13/2019  3:00 PM   Wound Width (cm) 1 cm 8/13/2019  3:00 PM   Wound Depth (cm) 0.1 cm 8/13/2019  3:00 PM   Wound Surface Area (cm^2) 4 cm^2 8/13/2019  3:00 PM   Tunneling 0 cm 8/13/2019  3:00 PM   Undermining 0 cm 8/13/2019  3:00 PM   Closure Secondary intention 8/13/2019  3:00 PM   Drainage Amount Small 8/13/2019  3:00 PM   Drainage Description Serous 8/13/2019  3:00 PM   Non-staged Wound Description Partial thickness 8/13/2019  3:00 PM   Cleansing Not Applicable 8/13/2019  3:00 PM   Periwound Protectant Not Applicable 8/13/2019  3:00 PM   Dressing Options Nonadherent Contact Layer 8/13/2019  3:00 PM   Dressing Cleansing/Solutions Not Applicable 8/13/2019  3:00 PM   Dressing Changed Other (Comment) 8/13/2019  3:00 PM   Dressing Status Intact 8/13/2019  3:00 PM   Dressing Change Frequency Weekly 8/13/2019  3:00 PM   WOUND NURSE ONLY - Odor None 8/13/2019  3:00 PM   WOUND NURSE ONLY - Exposed  Structures None 8/13/2019  3:00 PM   WOUND NURSE ONLY - Tissue Type and Percentage red 100% 8/13/2019  3:00 PM   WOUND NURSE ONLY - Time Spent with Patient (mins) 45 8/13/2019  3:00 PM      Lab Values:    A1C:      Lab Results   Component Value Date/Time    HBA1C 5.9 (H) 08/09/2019 02:35 AM           Culture:  NA    INTERVENTIONS BY WOUND TEAM:  With staff assistance head to toe assessment done noting only skin tear dorsum left hand.  Appropriate dressing in place and suggested adhesive foam be placed for drainage management.  Measurements taken.  Skin prevention measures in place with pillows/Prafo boot left foot for off loading heels, protective paste and waffle overlay.    Dressing selection:  Mepitel, adhesive foam         Interdisciplinary consultation: Patient, Bedside RN     EVALUATION: clean appearing skin tear that should resolve with current order    Factors affecting wound healing: elderly, anticipating transition to hospice care  Goals: Steady decrease in wound area and depth weekly.    NURSING PLAN OF CARE ORDERS (X):    Dressing changes: See Dressing Care orders: X  Skin care: See Skin Care orders:   Rectal tube care: See Rectal Tube Care orders:   Other orders:    RSKIN: CURRENT (X) ORDERED (O):   Q shift Garfield:  X  Q shift pressure point assessments:  X  Pressure redistribution mattress  X          ANAI          Bariatric ANAI         Bariatric foam           Heel float boots      X Prafo left foot   Float Heels off Bed with Pillows   X            Barrier wipes         Barrier Cream         Barrier paste          Sacral silicone dressing         Silicone O2 tubing         Anchorfast         Cannula fixation Device (Tender )          Gray Foam Ear protectors           Trach with Optifoam split foam                 Waffle cushion        Waffle Overlay  X       Rectal tube or BMS         Antifungal tx      Interdry          Reposition q 2 hours  X      Up to chair        Ambulate      PT/OT         Dietician        Diabetes Education      PO     TF  X   TPN     NPO   # days   Other        WOUND TEAM PLAN OF CARE (X):   NPWT change 3 x week:        Dressing changes by wound team:       Follow up as needed:    X   Other (explain):     Anticipated discharge plans (X):   SNF:           Home Care:           Outpatient Wound Center:            Self Care:            Other:    X ? Hospice care

## 2019-08-13 NOTE — PROGRESS NOTES
Hospital Medicine Daily Progress Note    Date of Service  8/12/2019    Chief Complaint  GLF, Left hemneglect    Hospital Course    *88-year-old female with reported history of dementia on Aricept history of coronary disease, frequent falls presented with history of ground-level fall, right-sided gaze and left-sided neglect she was found on the floor and symptoms were of unknown duration she had no ICH on exam but was way beyond the window for alteplase and so underwent thrombectomy for MCA stroke.  She was initially monitored in ICU, she has dysphasia and therefore has core track, she remains with hemineglect but encephalopathy has improved.  Palliative care is meeting with her as well as discussing care plan with son*      Interval Problem Update  Seen with RN, discussed with palliative care  I reviewed patient's reported advanced directives and clearly she does not want this reduce quality of life and this will need to be explored further.  At this point she is undecided regarding her wishes and wants her son more involve given her degree of orientation I am not sure she is very demented    Consultants/Specialty  Neurology, pulmonary medicine, palliative care    Code Status  DNR  Currently intubation okay  Has stated she does not want feeding tubes but apparently wants artificial hydration    Disposition  TBD    Review of Systems  Review of Systems   Unable to perform ROS: Acuity of condition        Physical Exam  Temp:  [36 °C (96.8 °F)-37.6 °C (99.6 °F)] 36.1 °C (97 °F)  Pulse:  [75-84] 83  Resp:  [16-18] 17  BP: (145-151)/(63-70) 145/69  SpO2:  [94 %-96 %] 94 %    Physical Exam   Constitutional: She appears well-developed. No distress.   Thin, frail, elderly   HENT:   Head: Normocephalic.   Core track in place  Still has rightward preference but is considerably less responsive today   Eyes: Right eye exhibits no discharge. Left eye exhibits no discharge.   Neck: Neck supple.   Cardiovascular: Normal rate and  regular rhythm.   Murmur heard.  Pulmonary/Chest:   Shallow effort lungs appear to be clear     Abdominal: Soft. Bowel sounds are normal. She exhibits no distension. There is no tenderness.   Musculoskeletal: She exhibits no edema or deformity.   Neurological: A cranial nerve deficit is present. She exhibits abnormal muscle tone.   Fatigued and lethargic   Skin: Skin is warm and dry. She is not diaphoretic.   Psychiatric:   Too drowsy and weak to determine   Nursing note and vitals reviewed.      Fluids    Intake/Output Summary (Last 24 hours) at 8/12/2019 1930  Last data filed at 8/12/2019 1700  Gross per 24 hour   Intake 240 ml   Output 1000 ml   Net -760 ml       Laboratory  Recent Labs     08/10/19  0410 08/11/19  0510   WBC 11.2* 14.1*   RBC 3.01* 3.20*   HEMOGLOBIN 9.4* 10.2*   HEMATOCRIT 28.9* 30.5*   MCV 96.0 95.3   MCH 31.2 31.9   MCHC 32.5* 33.4*   RDW 43.5 43.7   PLATELETCT 140* 171   MPV 9.6 9.7     Recent Labs     08/10/19  0410 08/11/19  0510   SODIUM 141 138   POTASSIUM 3.3* 3.9   CHLORIDE 113* 106   CO2 19* 26   GLUCOSE 86 117*   BUN 11 12   CREATININE 0.58 0.59   CALCIUM 7.0* 8.4*                   Imaging  [id number] VIEW   Final Result         1.  Nonspecific bowel gas pattern.   2.  Dobbhoff tube tip terminates overlying the expected location of the gastric antrum.      CT-HEAD W/O   Final Result      1.  Acute right MCA infarcts with hemorrhagic transformation seen in the right temporal lobe. No significant change from prior brain MRI given differences in technique.   2.  Atrophy and chronic microvascular ischemic type changes.      EC-ECHOCARDIOGRAM COMPLETE W/O CONT   Final Result      [id number] VIEW   Final Result         Feeding tube with tip projecting over the expected area of the distal stomach.      DX-ABDOMEN FOR TUBE PLACEMENT   Final Result      Feeding tube tip projects over the distal stomach or duodenal bulb.      DX-ABDOMEN FOR TUBE PLACEMENT   Final Result      1.  Feeding  tube tip projects near the GE junction.      MR-BRAIN-W/O   Final Result      1.  Moderate-sized acute right MCA territory infarct with hemorrhagic transformation.   2.  Moderate diffuse cervical substance loss.   3.  Advanced microangiopathic ischemic change versus demyelination or gliosis.   4.  Chronic ischemic pontine gliosis.   5.  Findings of pansinusitis.      These findings were discussed with MORIS CANTU on 8/9/2019 2:50 PM. These findings were discussed with Dr. Mahendra Mann for DrKatelynn CANTU on 8/9/2019 2:56 PM.      DX-CHEST-PORTABLE (1 VIEW)   Final Result      1.  No acute cardiopulmonary disease.   2.  Contrast present within mildly dilated LEFT renal collecting system.      IR-THROMBO MECHANICAL ARTERY,INIT   Final Result      1.  Occlusion of the right middle cerebral artery, M2 segment.      2.  Successful cerebral thrombectomy performed with post intervention angiogram demonstrating patent right middle cerebral artery vessels.      Findings were discussed with Dr. Miramontes at approximately 1755 hrs.      OUTSIDE IMAGES-CT HEAD   Final Result      OUTSIDE IMAGES-CT HEAD   Final Result      CT-CTA NECK WITH & W/O-POST PROCESSING   Final Result      Moderate calcified plaque right carotid artery bifurcation. No significant stenosis.   Left carotid arteries appear patent.   Vertebral arteries appear patent.   Focal enlargement of right lingual tonsils measuring 12.6 mm in diameter. Patient's condition permits, would consider further evaluation.      CT-CEREBRAL PERFUSION ANALYSIS   Final Result      1.  Cerebral blood flow less than 30% likely representing completed infarct = 38 mL.      2.  T Max more than 6 seconds likely representing combination of completed infarct and ischemia = 98 mL.      3.  Mismatched volume likely representing ischemic brain/penumbra = 60 mL      4.  Please note that the cerebral perfusion was performed on the limited brain tissue around the basal ganglia region.  "Infarct/ischemia outside the CT perfusion sections can be missed in this study.      CT-CTA HEAD WITH & W/O-POST PROCESS    (Results Pending)        Assessment/Plan  * CVA (cerebral vascular accident) (HCC)- (present on admission)  Assessment & Plan  Status post thrombectomy  MRI with hemorrhagic transformation     \"1.  Acute right MCA infarcts with hemorrhagic transformation seen in the right temporal lobe. No significant change from prior brain MRI given differences in technique.  2.  Atrophy and chronic microvascular ischemic type changes.\"          Electrolyte abnormality  Assessment & Plan  Improved/ nl today  F/u labs Tuesday    Acute cystitis without hematuria  Assessment & Plan  S/p CTX  E coli    Tonsillar enlargement  Assessment & Plan  Incidental on CT  Will need further work-up depending on clinical recovery    Anemia, blood loss  Assessment & Plan  stable    Acute encephalopathy  Assessment & Plan  Likely related to her stroke  Resolving she is completely oriented      DNR (do not resuscitate)- (present on admission)  Assessment & Plan  Met with palliative care and son today  Greater than 20 minutes spent devoted to this activity  This is a completely separate visit from her E and M  He would waffle frequently between doing everything resulting in her spending the rest of her days in skilled nursing  And then deciding that may be it was better to let her go  Ultimately he has agreed to speak with hospice  Hospice order placed      Hyponatremia- (present on admission)  Assessment & Plan  Improved  Recheck tomorrow    Leukocytosis  Assessment & Plan  Secondary to UTI with positive cultures at outside hospital    Recheck tomorrow    CAD (coronary artery disease)- (present on admission)  Assessment & Plan  Continue atorvastatin restarting aspirin      Dementia- (present on admission)  Assessment & Plan  She does not seem very demented  Continue Aricept at this time       VTE prophylaxis: SCDs      "

## 2019-08-13 NOTE — HOSPICE
ATTN: PROVIDER/CARE MANAGEMENT TEAM/NURSES     RE: Referral for [facility]     As of 8/12/19, we have accepted the referral to [facility] for the patient listed above.     To access the [facility] Notes you can click on Chart Review and then click onto Encounters (left top corner of screen) and see our documentation.      If you have any questions or concerns regarding the patient’s transition to [facility], please do not hesitate to contact us at [phone]     We look forward to collaborating with you,  [facility] Care Team    * I called pt's son with pt's permission to set up an appointment for a hospice discussion. He didn't answer, VM left with hospice contact information. Hospice RN team will round tomorrow on pt. *

## 2019-08-13 NOTE — CARE PLAN
Problem: Safety  Goal: Will remain free from falls  Outcome: PROGRESSING AS EXPECTED  Note:   Call light within reach. Bed locked and in lowest position. Bed alarm on. Room near nurses station. Rounding maintained.      Problem: Venous Thromboembolism (VTW)/Deep Vein Thrombosis (DVT) Prevention:  Goal: Patient will participate in Venous Thrombosis (VTE)/Deep Vein Thrombosis (DVT)Prevention Measures  Outcome: PROGRESSING AS EXPECTED  Flowsheets  Taken 8/12/2019 1951  Mechanical Prophylaxis : SCDs, Sequential Compression Device  Taken 8/13/2019 0337  SCDs, Sequential Compression Device: On     Problem: Urinary Elimination:  Goal: Ability to reestablish a normal urinary elimination pattern will improve  Outcome: PROGRESSING SLOWER THAN EXPECTED  Note:   Incontinence. Purewick in place.

## 2019-08-13 NOTE — CARE PLAN
Problem: Communication  Goal: The ability to communicate needs accurately and effectively will improve  Outcome: PROGRESSING AS EXPECTED   Continues with expressive aphasia but able to communicate needs  Problem: Safety  Goal: Will remain free from falls  Outcome: PROGRESSING AS EXPECTED   Bed alarm in use. Bed in lowest and locked position.

## 2019-08-13 NOTE — PROGRESS NOTES
Cortrak Placement    Tube Team verified patient name and medical record number prior to tube placement.  Cortrak tube (43 inches, 10 [place]) placed at 70 cm in left nare.  Per Cortrak picture, tube appears to be in the stomach.  Nursing Instructions: Awaiting KUB to confirm placement before use for medications or feeding. Once placement confirmed, flush tube with 30 ml of water, and then remove and save stylet, in patient medication drawer.

## 2019-08-13 NOTE — PROGRESS NOTES
2 RN skin check complete with Nichole ANNE  Multiple bruises to BUE, BLE, Abdomen and upper chest. Skin tear to left hand with mepilex lite in place.   Excoriation to [place]-area secondary to incontinence associated dermatitis. Right groin site with dressing in place with bruising. Sacrum red and blanchable.   Waffle overlay in use.  Q2 hour turns.  Purewick in place to help decrease moisture.    no

## 2019-08-13 NOTE — PROGRESS NOTES
Hospital Medicine Daily Progress Note    Date of Service  8/13/2019    Chief Complaint  GLF, Left hemneglect    Hospital Course    *88-year-old female with reported history of dementia on Aricept history of coronary disease, frequent falls presented with history of ground-level fall, right-sided gaze and left-sided neglect she was found on the floor and symptoms were of unknown duration she had no ICH on exam but was way beyond the window for alteplase and so underwent thrombectomy for MCA stroke.  She was initially monitored in ICU, she has dysphagia and therefore has core track, she remains with hemineglect but encephalopathy has improved.  Palliative care following.       Interval Problem Update  8/13:  No change in clinical status.  Continues to have left side neglect and flaccidity.  No improvement of dysphagia.  Appear comfortable.  Denies pain.     Consultants/Specialty  Neurology  pulmonary medicine s/o   palliative care    Code Status  DNR    Disposition  TBD.  Family and patient considering hospice.  Referral placed.  CM following.     Review of Systems  Review of Systems   Unable to perform ROS: Acuity of condition        Physical Exam  Temp:  [36.1 °C (97 °F)-37.7 °C (99.8 °F)] 36.1 °C (97 °F)  Pulse:  [77-83] 79  Resp:  [16-17] 17  BP: (145-160)/(62-84) 148/79  SpO2:  [92 %-96 %] 92 %    Physical Exam   Constitutional: She appears well-developed and well-nourished.   Thin, frail, elderly   HENT:   Head: Normocephalic and atraumatic.   Mouth/Throat: No oropharyngeal exudate.   Core track in place     Eyes: Conjunctivae are normal. Right eye exhibits no discharge. Left eye exhibits no discharge.   Neck: Neck supple. No tracheal deviation present.   Cardiovascular: Normal rate and regular rhythm. Exam reveals no friction rub.   Murmur heard.  Pulmonary/Chest: Effort normal. No stridor. No respiratory distress. She has decreased breath sounds in the right lower field and the left lower field. She has no  wheezes.        Abdominal: Soft. Bowel sounds are normal. She exhibits no distension. There is no tenderness. There is no rebound.   Musculoskeletal: She exhibits no edema.   Neurological: She is alert. A cranial nerve deficit is present. She exhibits abnormal muscle tone. Coordination abnormal.   Left side neglect and flaccidity.  Dysarthia  Dysphagia   Skin: Skin is warm and dry. No rash noted. She is not diaphoretic. No erythema.   Nursing note and vitals reviewed.      Fluids    Intake/Output Summary (Last 24 hours) at 8/13/2019 1539  Last data filed at 8/13/2019 1200  Gross per 24 hour   Intake 210 ml   Output 2000 ml   Net -1790 ml       Laboratory  Recent Labs     08/11/19  0510 08/13/19 0417   WBC 14.1* 10.6   RBC 3.20* 3.40*   HEMOGLOBIN 10.2* 10.8*   HEMATOCRIT 30.5* 32.6*   MCV 95.3 95.9   MCH 31.9 31.8   MCHC 33.4* 33.1*   RDW 43.7 43.5   PLATELETCT 171 206   MPV 9.7 9.4     Recent Labs     08/11/19 0510 08/13/19 0417   SODIUM 138 138   POTASSIUM 3.9 3.8   CHLORIDE 106 107   CO2 26 26   GLUCOSE 117* 90   BUN 12 8   CREATININE 0.59 0.53   CALCIUM 8.4* 8.0*                   Imaging  [id number]-0 VIEW   Final Result         1.  Nonspecific bowel gas pattern.   2.  Dobbhoff tube with tip terminating overlying the expected location of the first or second duodenal segment.      [id number] VIEW   Final Result         1.  Nonspecific bowel gas pattern.   2.  Dobbhoff tube tip terminates overlying the expected location of the gastric antrum.      CT-HEAD W/O   Final Result      1.  Acute right MCA infarcts with hemorrhagic transformation seen in the right temporal lobe. No significant change from prior brain MRI given differences in technique.   2.  Atrophy and chronic microvascular ischemic type changes.      EC-ECHOCARDIOGRAM COMPLETE W/O CONT   Final Result      [id number]-2 VIEW   Final Result         Feeding tube with tip projecting over the expected area of the distal stomach.      DX-ABDOMEN FOR TUBE  PLACEMENT   Final Result      Feeding tube tip projects over the distal stomach or duodenal bulb.      DX-ABDOMEN FOR TUBE PLACEMENT   Final Result      1.  Feeding tube tip projects near the GE junction.      MR-BRAIN-W/O   Final Result      1.  Moderate-sized acute right MCA territory infarct with hemorrhagic transformation.   2.  Moderate diffuse cervical substance loss.   3.  Advanced microangiopathic ischemic change versus demyelination or gliosis.   4.  Chronic ischemic pontine gliosis.   5.  Findings of pansinusitis.      These findings were discussed with MORIS CANTU on 8/9/2019 2:50 PM. These findings were discussed with Dr. Mahendra Mann for Dr. MORIS CANTU on 8/9/2019 2:56 PM.      DX-CHEST-PORTABLE (1 VIEW)   Final Result      1.  No acute cardiopulmonary disease.   2.  Contrast present within mildly dilated LEFT renal collecting system.      IR-THROMBO MECHANICAL ARTERY,INIT   Final Result      1.  Occlusion of the right middle cerebral artery, M2 segment.      2.  Successful cerebral thrombectomy performed with post intervention angiogram demonstrating patent right middle cerebral artery vessels.      Findings were discussed with Dr. Miramontes at approximately 1755 hrs.      OUTSIDE IMAGES-CT HEAD   Final Result      OUTSIDE IMAGES-CT HEAD   Final Result      CT-CTA NECK WITH & W/O-POST PROCESSING   Final Result      Moderate calcified plaque right carotid artery bifurcation. No significant stenosis.   Left carotid arteries appear patent.   Vertebral arteries appear patent.   Focal enlargement of right lingual tonsils measuring 12.6 mm in diameter. Patient's condition permits, would consider further evaluation.      CT-CEREBRAL PERFUSION ANALYSIS   Final Result      1.  Cerebral blood flow less than 30% likely representing completed infarct = 38 mL.      2.  T Max more than 6 seconds likely representing combination of completed infarct and ischemia = 98 mL.      3.  Mismatched volume likely  "representing ischemic brain/penumbra = 60 mL      4.  Please note that the cerebral perfusion was performed on the limited brain tissue around the basal ganglia region. Infarct/ischemia outside the CT perfusion sections can be missed in this study.      CT-CTA HEAD WITH & W/O-POST PROCESS    (Results Pending)        Assessment/Plan  * CVA (cerebral vascular accident) (HCC)- (present on admission)  Assessment & Plan  Status post thrombectomy  MRI with hemorrhagic transformation     \"1.  Acute right MCA infarcts with hemorrhagic transformation seen in the right temporal lobe. No significant change from prior brain MRI given differences in technique.  2.  Atrophy and chronic microvascular ischemic type changes.\"    Continues to have significant debility.  Severe dysphagia requiring cortrak and TF  Left side neglect and flaccidity   Hospice consulted.   Continue ASA and lipitor for now.          Electrolyte abnormality  Assessment & Plan  Improved.     Acute cystitis without hematuria  Assessment & Plan  S/p CTX  E coli    Tonsillar enlargement  Assessment & Plan  Incidental on CT  Will need further work-up depending on clinical recovery    Anemia, blood loss  Assessment & Plan  stable    Acute encephalopathy  Assessment & Plan  Likely related to her stroke  Much improved.        DNR (do not resuscitate)- (present on admission)  Assessment & Plan  Met with palliative care and son today  Greater than 20 minutes spent devoted to this activity  This is a completely separate visit from her E and M  He would waffle frequently between doing everything resulting in her spending the rest of her days in skilled nursing  And then deciding that may be it was better to let her go  Ultimately he has agreed to speak with hospice  Hospice order placed      Hyponatremia- (present on admission)  Assessment & Plan  Resolved.     Leukocytosis  Assessment & Plan  Resolved.     CAD (coronary artery disease)- (present on " admission)  Assessment & Plan  Continue atorvastatin restarting aspirin      Falls frequently- (present on admission)  Assessment & Plan  No with left side neglect and flaccidity  PT/OT following.  Poor prognosis  Hospice consulted.     Dementia- (present on admission)  Assessment & Plan  She does not seem very demented  Continue Aricept at this time       VTE prophylaxis: SCDs

## 2019-08-14 LAB
ANION GAP SERPL CALC-SCNC: 8 MMOL/L (ref 0–11.9)
BUN SERPL-MCNC: 14 MG/DL (ref 8–22)
CALCIUM SERPL-MCNC: 8.8 MG/DL (ref 8.5–10.5)
CHLORIDE SERPL-SCNC: 107 MMOL/L (ref 96–112)
CO2 SERPL-SCNC: 25 MMOL/L (ref 20–33)
CREAT SERPL-MCNC: 0.59 MG/DL (ref 0.5–1.4)
GLUCOSE SERPL-MCNC: 110 MG/DL (ref 65–99)
MAGNESIUM SERPL-MCNC: 1.8 MG/DL (ref 1.5–2.5)
POTASSIUM SERPL-SCNC: 3.9 MMOL/L (ref 3.6–5.5)
SODIUM SERPL-SCNC: 140 MMOL/L (ref 135–145)

## 2019-08-14 PROCEDURE — 99232 SBSQ HOSP IP/OBS MODERATE 35: CPT | Performed by: INTERNAL MEDICINE

## 2019-08-14 PROCEDURE — 770006 HCHG ROOM/CARE - MED/SURG/GYN SEMI*

## 2019-08-14 PROCEDURE — 700102 HCHG RX REV CODE 250 W/ 637 OVERRIDE(OP): Performed by: INTERNAL MEDICINE

## 2019-08-14 PROCEDURE — 770020 HCHG ROOM/CARE - TELE (206)

## 2019-08-14 PROCEDURE — 83735 ASSAY OF MAGNESIUM: CPT

## 2019-08-14 PROCEDURE — 700102 HCHG RX REV CODE 250 W/ 637 OVERRIDE(OP): Performed by: HOSPITALIST

## 2019-08-14 PROCEDURE — 80048 BASIC METABOLIC PNL TOTAL CA: CPT

## 2019-08-14 PROCEDURE — A9270 NON-COVERED ITEM OR SERVICE: HCPCS | Performed by: INTERNAL MEDICINE

## 2019-08-14 PROCEDURE — 36415 COLL VENOUS BLD VENIPUNCTURE: CPT

## 2019-08-14 PROCEDURE — A9270 NON-COVERED ITEM OR SERVICE: HCPCS | Performed by: HOSPITALIST

## 2019-08-14 RX ADMIN — DONEPEZIL HYDROCHLORIDE 10 MG: 5 TABLET, FILM COATED ORAL at 16:00

## 2019-08-14 RX ADMIN — ACETAMINOPHEN 650 MG: 325 TABLET, FILM COATED ORAL at 05:36

## 2019-08-14 RX ADMIN — LEVOTHYROXINE SODIUM 25 MCG: 25 TABLET ORAL at 05:20

## 2019-08-14 RX ADMIN — ASPIRIN 81 MG 81 MG: 81 TABLET ORAL at 05:20

## 2019-08-14 RX ADMIN — ATORVASTATIN CALCIUM 40 MG: 40 TABLET, FILM COATED ORAL at 20:54

## 2019-08-14 NOTE — PROGRESS NOTES
Pt AO 1-3, on RA, tele in place, NPO with replete fiber running at goal rate of 50. Incontinent x2, pure wick on. Bilateral soft wrist restraints in place to prevent pulling  Cor trak. Q2h turns in place, fall precautions in place.

## 2019-08-14 NOTE — PROGRESS NOTES
Monitor summary: SR 71-86, WY 0.14, QRS 0.08, QT 0.40, rare to occasional PVCs, rare trigeminy PVCs, rare couplet PVCs per strip from monitor room.

## 2019-08-14 NOTE — PROGRESS NOTES
Monitor summary: SR 76-84, GA 0.14, QRS 0.08, QT 0.36, SR with frequent PVCs/PACs and 2 episodes of bigem  per strip from monitor room.

## 2019-08-14 NOTE — DISCHARGE PLANNING
Anticipated Discharge Disposition:   Hospice and long-term care at nursing facility    Action:    Patient admitted with CVA.  Has lamine. NPO.  PT/OT indicate max assist.    RN CM observed patient in bed.  Appeared sleeping with regular respirations.  VS per epic 146/81 P 81 R 18 SPO2 92% RA.    Spoke to patient's son, Mikael.  He stated that he has been taking care of patient for 8 years at her home.  He stated that patient's spouse was 100% service connected disability from [employer].  Discussed patient going home with hospice or to nursing facility with hospice.  Explained that RN CM would attempt to get [facility] to be [facility]or for the long-term care.  Per [facility] RN April, they do not go to [place].  RN CM obtained choices for [facility] and Francesca who do go to [place]. Discussed lamine and Mikael would like to speak with hospice RN.    Hospice choice form faxed to [facility].  No [facility] referral faxed to [facility].    Barriers to Discharge:    Hospice acceptance  Payor source for nursing facility placement.    Plan:    Send referrals to [facility].  Contact [facility].

## 2019-08-14 NOTE — DISCHARGE PLANNING
Received Choice form at 0810  Agency/Facility Name: [facility]  Referral sent per Choice form @ 0812    Received Choice form at 0815  Agency/Facility Name: [facility]  Referral sent per Choice form @ 0838

## 2019-08-14 NOTE — PROGRESS NOTES
2 Rn skin check:  Skin tear/scab to L ankle, boot drop boot in place.   R groin incision, bruised CALI  Sacrum pink and blanching,  L hand skin tear, dressing in place  Scattered bruising to trunk, hips and BUE  Bruising and scabbing to L face.

## 2019-08-14 NOTE — CARE PLAN
Problem: Venous Thromboembolism (VTW)/Deep Vein Thrombosis (DVT) Prevention:  Goal: Patient will participate in Venous Thrombosis (VTE)/Deep Vein Thrombosis (DVT)Prevention Measures  Outcome: PROGRESSING AS EXPECTED    SCDs in place to reduce VTE risk.     Problem: Bowel/Gastric:  Goal: Will not experience complications related to bowel motility  Outcome: PROGRESSING AS EXPECTED   Hold stool softeners as neede for loose BM

## 2019-08-14 NOTE — PROGRESS NOTES
2 RN skin check completed with OMID Liao     Bruising and scabbing to L face.     Bruising to upper chest.     Scattered bruising and abrasions to all extremities.    L hand skin tear- mepi-lite in place.     R groin site bruising, CALI.     L foot drop boot in place.     Excoriation to bilateral groins, perineum, and buttocks- barrier cream used.      Waffle mattress overlay in place.     Pt turned q2h.

## 2019-08-14 NOTE — PROGRESS NOTES
Hospital Medicine Daily Progress Note    Date of Service  8/14/2019    Chief Complaint  GLF, Left hemneglect    Hospital Course    *88-year-old female with reported history of dementia on Aricept history of coronary disease, frequent falls presented with history of ground-level fall, right-sided gaze and left-sided neglect she was found on the floor and symptoms were of unknown duration she had no ICH on exam but was way beyond the window for alteplase and so underwent thrombectomy for MCA stroke.  She was initially monitored in ICU, she has dysphagia and therefore has core track, she remains with hemineglect but encephalopathy has improved.  Palliative care following.       Interval Problem Update  8/13:  No change in clinical status.  Continues to have left side neglect and flaccidity.  No improvement of dysphagia.  Appear comfortable.  Denies pain.   8/14:  Patient arousable.  Does complain of pain, but unable to verbalize location of pain.  Easily falls back to sleep.      Consultants/Specialty  Neurology  pulmonary medicine s/o   palliative care    Code Status  DNR    Disposition  Pending hospice.  CM following.     Review of Systems  Review of Systems   Unable to perform ROS: Acuity of condition        Physical Exam  Temp:  [36.1 °C (96.9 °F)-36.6 °C (97.8 °F)] 36.6 °C (97.8 °F)  Pulse:  [76-86] 86  Resp:  [16-18] 18  BP: (135-155)/(63-89) 137/75  SpO2:  [91 %-95 %] 95 %    Physical Exam   Constitutional: She appears well-developed and well-nourished. No distress.   Thin, frail, elderly   HENT:   Head: Normocephalic and atraumatic.   Right Ear: External ear normal.   Left Ear: External ear normal.   Core track in place     Eyes: Conjunctivae are normal. No scleral icterus.   Neck: Neck supple. No JVD present.   Cardiovascular: Normal rate and regular rhythm.   Murmur heard.  Pulmonary/Chest: Effort normal. No stridor. No respiratory distress. She has decreased breath sounds in the right lower field and the  left lower field. She has no wheezes. She has no rales.        Abdominal: Soft. Bowel sounds are normal. She exhibits no distension. There is no tenderness.   Musculoskeletal: She exhibits no edema or tenderness.   Neurological: She is alert. A cranial nerve deficit is present. Coordination abnormal.   Left side neglect and flaccidity.  Dysarthia  Dysphagia   Skin: Skin is warm and dry. She is not diaphoretic. No erythema.   Nursing note and vitals reviewed.      Fluids    Intake/Output Summary (Last 24 hours) at 8/14/2019 1344  Last data filed at 8/14/2019 1000  Gross per 24 hour   Intake 520 ml   Output 2000 ml   Net -1480 ml       Laboratory  Recent Labs     08/13/19  0417   WBC 10.6   RBC 3.40*   HEMOGLOBIN 10.8*   HEMATOCRIT 32.6*   MCV 95.9   MCH 31.8   MCHC 33.1*   RDW 43.5   PLATELETCT 206   MPV 9.4     Recent Labs     08/13/19 0417 08/14/19  0344   SODIUM 138 140   POTASSIUM 3.8 3.9   CHLORIDE 107 107   CO2 26 25   GLUCOSE 90 110*   BUN 8 14   CREATININE 0.53 0.59   CALCIUM 8.0* 8.8                   Imaging  [id number] VIEW   Final Result         1.  Nonspecific bowel gas pattern.   2.  Dobbhoff tube with tip terminating overlying the expected location of the first or second duodenal segment.      [id number] VIEW   Final Result         1.  Nonspecific bowel gas pattern.   2.  Dobbhoff tube tip terminates overlying the expected location of the gastric antrum.      CT-HEAD W/O   Final Result      1.  Acute right MCA infarcts with hemorrhagic transformation seen in the right temporal lobe. No significant change from prior brain MRI given differences in technique.   2.  Atrophy and chronic microvascular ischemic type changes.      EC-ECHOCARDIOGRAM COMPLETE W/O CONT   Final Result      [id number]-6 VIEW   Final Result         Feeding tube with tip projecting over the expected area of the distal stomach.      DX-ABDOMEN FOR TUBE PLACEMENT   Final Result      Feeding tube tip projects over the distal  stomach or duodenal bulb.      DX-ABDOMEN FOR TUBE PLACEMENT   Final Result      1.  Feeding tube tip projects near the GE junction.      MR-BRAIN-W/O   Final Result      1.  Moderate-sized acute right MCA territory infarct with hemorrhagic transformation.   2.  Moderate diffuse cervical substance loss.   3.  Advanced microangiopathic ischemic change versus demyelination or gliosis.   4.  Chronic ischemic pontine gliosis.   5.  Findings of pansinusitis.      These findings were discussed with MORIS CANTU on 8/9/2019 2:50 PM. These findings were discussed with Dr. Mahendra Mann for Dr. MORIS CANTU on 8/9/2019 2:56 PM.      DX-CHEST-PORTABLE (1 VIEW)   Final Result      1.  No acute cardiopulmonary disease.   2.  Contrast present within mildly dilated LEFT renal collecting system.      IR-THROMBO MECHANICAL ARTERY,INIT   Final Result      1.  Occlusion of the right middle cerebral artery, M2 segment.      2.  Successful cerebral thrombectomy performed with post intervention angiogram demonstrating patent right middle cerebral artery vessels.      Findings were discussed with Dr. Miramontes at approximately 1755 hrs.      OUTSIDE IMAGES-CT HEAD   Final Result      OUTSIDE IMAGES-CT HEAD   Final Result      CT-CTA NECK WITH & W/O-POST PROCESSING   Final Result      Moderate calcified plaque right carotid artery bifurcation. No significant stenosis.   Left carotid arteries appear patent.   Vertebral arteries appear patent.   Focal enlargement of right lingual tonsils measuring 12.6 mm in diameter. Patient's condition permits, would consider further evaluation.      CT-CEREBRAL PERFUSION ANALYSIS   Final Result      1.  Cerebral blood flow less than 30% likely representing completed infarct = 38 mL.      2.  T Max more than 6 seconds likely representing combination of completed infarct and ischemia = 98 mL.      3.  Mismatched volume likely representing ischemic brain/penumbra = 60 mL      4.  Please note that the cerebral  "perfusion was performed on the limited brain tissue around the basal ganglia region. Infarct/ischemia outside the CT perfusion sections can be missed in this study.      CT-CTA HEAD WITH & W/O-POST PROCESS    (Results Pending)        Assessment/Plan  * CVA (cerebral vascular accident) (HCC)- (present on admission)  Assessment & Plan  Status post thrombectomy  MRI with hemorrhagic transformation     \"1.  Acute right MCA infarcts with hemorrhagic transformation seen in the right temporal lobe. No significant change from prior brain MRI given differences in technique.  2.  Atrophy and chronic microvascular ischemic type changes.\"    Continues to have significant debility.  Severe dysphagia requiring cortrak and TF  Left side neglect and flaccidity   Hospice consulted.   Continue ASA and lipitor for now.  Referrals placed.  CM following           Electrolyte abnormality  Assessment & Plan  Improved.     Acute cystitis without hematuria  Assessment & Plan  S/p CTX  E coli    Tonsillar enlargement  Assessment & Plan  Incidental on CT  Will need further work-up depending on clinical recovery    Anemia, blood loss  Assessment & Plan  stable    Acute encephalopathy  Assessment & Plan  Secondary to stroke  Much improved.    Difficult to fully assess secondary to severe expressive aphasia.  Likely at new baseline.       DNR (do not resuscitate)- (present on admission)  Assessment & Plan  Met with palliative care and son today  Greater than 20 minutes spent devoted to this activity  This is a completely separate visit from her E and M  He would waffle frequently between doing everything resulting in her spending the rest of her days in skilled nursing  And then deciding that may be it was better to let her go  Ultimately he has agreed to speak with hospice  Hospice order placed      Hyponatremia- (present on admission)  Assessment & Plan  Resolved.     Leukocytosis  Assessment & Plan  Resolved.     CAD (coronary artery " disease)- (present on admission)  Assessment & Plan  Continue atorvastatin restarting aspirin      Falls frequently- (present on admission)  Assessment & Plan  No with left side neglect and flaccidity  PT/OT following.  Poor prognosis  Hospice consulted.     Dementia- (present on admission)  Assessment & Plan  She does not seem very demented  Continue Aricept at this time       VTE prophylaxis: SCDs

## 2019-08-15 ENCOUNTER — HOME CARE VISIT (OUTPATIENT)
Dept: HOSPICE | Facility: HOSPICE | Age: 84
End: 2019-08-15
Payer: MEDICARE

## 2019-08-15 LAB — MAGNESIUM SERPL-MCNC: 1.8 MG/DL (ref 1.5–2.5)

## 2019-08-15 PROCEDURE — 770020 HCHG ROOM/CARE - TELE (206)

## 2019-08-15 PROCEDURE — 99232 SBSQ HOSP IP/OBS MODERATE 35: CPT | Performed by: INTERNAL MEDICINE

## 2019-08-15 PROCEDURE — 700102 HCHG RX REV CODE 250 W/ 637 OVERRIDE(OP): Performed by: HOSPITALIST

## 2019-08-15 PROCEDURE — 36415 COLL VENOUS BLD VENIPUNCTURE: CPT

## 2019-08-15 PROCEDURE — 700102 HCHG RX REV CODE 250 W/ 637 OVERRIDE(OP): Performed by: INTERNAL MEDICINE

## 2019-08-15 PROCEDURE — A9270 NON-COVERED ITEM OR SERVICE: HCPCS | Performed by: HOSPITALIST

## 2019-08-15 PROCEDURE — 83735 ASSAY OF MAGNESIUM: CPT

## 2019-08-15 PROCEDURE — A9270 NON-COVERED ITEM OR SERVICE: HCPCS | Performed by: INTERNAL MEDICINE

## 2019-08-15 PROCEDURE — 770006 HCHG ROOM/CARE - MED/SURG/GYN SEMI*

## 2019-08-15 RX ADMIN — ATORVASTATIN CALCIUM 40 MG: 40 TABLET, FILM COATED ORAL at 20:01

## 2019-08-15 RX ADMIN — ASPIRIN 81 MG 81 MG: 81 TABLET ORAL at 04:27

## 2019-08-15 RX ADMIN — LEVOTHYROXINE SODIUM 25 MCG: 25 TABLET ORAL at 04:27

## 2019-08-15 RX ADMIN — ACETAMINOPHEN 650 MG: 325 TABLET, FILM COATED ORAL at 00:04

## 2019-08-15 RX ADMIN — DONEPEZIL HYDROCHLORIDE 10 MG: 5 TABLET, FILM COATED ORAL at 16:50

## 2019-08-15 RX ADMIN — ACETAMINOPHEN 650 MG: 325 TABLET, FILM COATED ORAL at 20:01

## 2019-08-15 RX ADMIN — ACETAMINOPHEN 650 MG: 325 TABLET, FILM COATED ORAL at 11:08

## 2019-08-15 NOTE — CARE PLAN
Problem: Safety  Goal: Will remain free from injury  Outcome: PROGRESSING AS EXPECTED   Treaded socks in place. Bed alarm on. Bed locked and in lowest position. Call light within reach.   Problem: Skin Integrity  Goal: Risk for impaired skin integrity will decrease  Outcome: PROGRESSING AS EXPECTED   Q2 turns and waffle overlay in place. Barrier cream in use.

## 2019-08-15 NOTE — PROGRESS NOTES
Monitor summary: SR 73-90, OK .14, QRS .08, QT .38, with occasional PVCs and 6 betas of vtach at 1600 and HR up to 150's non sustaining per strip from monitor room.

## 2019-08-15 NOTE — PROGRESS NOTES
Assumed care of pt at 0700.   Pt is confused and oriented to self only.  Pt denies pain at this time.   Tele monitor in place.   Pt has Coretrak in L nare running Replete Fiber at 50ml/hr, which is goal.   Purewick in place for incontinence.   Q2 turning and hourly rounding in place.   Plan of care discussed.

## 2019-08-15 NOTE — PROGRESS NOTES
Hospital Medicine Daily Progress Note    Date of Service  8/15/2019    Chief Complaint  GLF, Left hemneglect    Hospital Course    *88-year-old female with reported history of dementia on Aricept history of coronary disease, frequent falls presented with history of ground-level fall, right-sided gaze and left-sided neglect she was found on the floor and symptoms were of unknown duration she had no ICH on exam but was way beyond the window for alteplase and so underwent thrombectomy for MCA stroke.  She was initially monitored in ICU, she has dysphagia and therefore has core track, she remains with hemineglect but encephalopathy has improved.  Palliative care following.       Interval Problem Update  8/13:  No change in clinical status.  Continues to have left side neglect and flaccidity.  No improvement of dysphagia.  Appear comfortable.  Denies pain.   8/14:  Patient arousable.  Does complain of pain, but unable to verbalize location of pain.  Easily falls back to sleep.   8/15:  Patient slightly more alert today.  Son at bedside.  He is mentioning the possibility of G-tube placement and he also mentions wanting his mother to be intubated if necessary, although he requests hospice.  The hospice RN is expected to follow up for clarification.      Consultants/Specialty  Neurology  pulmonary medicine s/o   palliative care    Code Status  DNR    Disposition  Pending hospice.  CM following.     Review of Systems  Review of Systems   Unable to perform ROS: Acuity of condition        Physical Exam  Temp:  [36.1 °C (96.9 °F)-37.3 °C (99.2 °F)] 36.4 °C (97.6 °F)  Pulse:  [86-95] 89  Resp:  [16-18] 17  BP: (135-159)/(68-82) 155/71  SpO2:  [92 %-95 %] 95 %    Physical Exam   Constitutional: She appears well-developed and well-nourished.   Thin, frail, elderly   HENT:   Head: Normocephalic and atraumatic.   Mouth/Throat: No oropharyngeal exudate.   Core track in place     Eyes: Conjunctivae are normal. Right eye exhibits no  discharge. Left eye exhibits no discharge.   Neck: Neck supple. No tracheal deviation present.   Cardiovascular: Normal rate and regular rhythm. Exam reveals no friction rub.   Murmur heard.  Pulmonary/Chest: Effort normal. No stridor. No respiratory distress. She has decreased breath sounds in the right lower field and the left lower field. She has no wheezes.        Abdominal: Soft. Bowel sounds are normal. She exhibits no distension. There is no tenderness. There is no rebound.   Musculoskeletal: She exhibits no edema.   Neurological: She is alert. A cranial nerve deficit is present. She exhibits abnormal muscle tone. Coordination abnormal.   Left side neglect and flaccidity.  Dysarthia  Dysphagia   Skin: Skin is warm and dry. No rash noted. She is not diaphoretic. No erythema.   Psychiatric:   Unable to adequately assess.    Nursing note and vitals reviewed.      Fluids    Intake/Output Summary (Last 24 hours) at 8/15/2019 1456  Last data filed at 8/15/2019 1200  Gross per 24 hour   Intake 1690 ml   Output 450 ml   Net 1240 ml       Laboratory  Recent Labs     08/13/19  0417   WBC 10.6   RBC 3.40*   HEMOGLOBIN 10.8*   HEMATOCRIT 32.6*   MCV 95.9   MCH 31.8   MCHC 33.1*   RDW 43.5   PLATELETCT 206   MPV 9.4     Recent Labs     08/13/19  0417 08/14/19  0344   SODIUM 138 140   POTASSIUM 3.8 3.9   CHLORIDE 107 107   CO2 26 25   GLUCOSE 90 110*   BUN 8 14   CREATININE 0.53 0.59   CALCIUM 8.0* 8.8                   Imaging  [id number] VIEW   Final Result         1.  Nonspecific bowel gas pattern.   2.  Dobbhoff tube with tip terminating overlying the expected location of the first or second duodenal segment.      [id number] VIEW   Final Result         1.  Nonspecific bowel gas pattern.   2.  Dobbhoff tube tip terminates overlying the expected location of the gastric antrum.      CT-HEAD W/O   Final Result      1.  Acute right MCA infarcts with hemorrhagic transformation seen in the right temporal lobe. No  significant change from prior brain MRI given differences in technique.   2.  Atrophy and chronic microvascular ischemic type changes.      EC-ECHOCARDIOGRAM COMPLETE W/O CONT   Final Result      [id number] VIEW   Final Result         Feeding tube with tip projecting over the expected area of the distal stomach.      DX-ABDOMEN FOR TUBE PLACEMENT   Final Result      Feeding tube tip projects over the distal stomach or duodenal bulb.      DX-ABDOMEN FOR TUBE PLACEMENT   Final Result      1.  Feeding tube tip projects near the GE junction.      MR-BRAIN-W/O   Final Result      1.  Moderate-sized acute right MCA territory infarct with hemorrhagic transformation.   2.  Moderate diffuse cervical substance loss.   3.  Advanced microangiopathic ischemic change versus demyelination or gliosis.   4.  Chronic ischemic pontine gliosis.   5.  Findings of pansinusitis.      These findings were discussed with MORIS CANTU on 8/9/2019 2:50 PM. These findings were discussed with Dr. Mahendra Mann for Dr. MORIS CANTU on 8/9/2019 2:56 PM.      DX-CHEST-PORTABLE (1 VIEW)   Final Result      1.  No acute cardiopulmonary disease.   2.  Contrast present within mildly dilated LEFT renal collecting system.      IR-THROMBO MECHANICAL ARTERY,INIT   Final Result      1.  Occlusion of the right middle cerebral artery, M2 segment.      2.  Successful cerebral thrombectomy performed with post intervention angiogram demonstrating patent right middle cerebral artery vessels.      Findings were discussed with Dr. Miramontes at approximately 1755 hrs.      OUTSIDE IMAGES-CT HEAD   Final Result      OUTSIDE IMAGES-CT HEAD   Final Result      CT-CTA NECK WITH & W/O-POST PROCESSING   Final Result      Moderate calcified plaque right carotid artery bifurcation. No significant stenosis.   Left carotid arteries appear patent.   Vertebral arteries appear patent.   Focal enlargement of right lingual tonsils measuring 12.6 mm in diameter. Patient's condition  "permits, would consider further evaluation.      CT-CEREBRAL PERFUSION ANALYSIS   Final Result      1.  Cerebral blood flow less than 30% likely representing completed infarct = 38 mL.      2.  T Max more than 6 seconds likely representing combination of completed infarct and ischemia = 98 mL.      3.  Mismatched volume likely representing ischemic brain/penumbra = 60 mL      4.  Please note that the cerebral perfusion was performed on the limited brain tissue around the basal ganglia region. Infarct/ischemia outside the CT perfusion sections can be missed in this study.      CT-CTA HEAD WITH & W/O-POST PROCESS    (Results Pending)        Assessment/Plan  * CVA (cerebral vascular accident) (HCC)- (present on admission)  Assessment & Plan  Status post thrombectomy  MRI with hemorrhagic transformation     \"1.  Acute right MCA infarcts with hemorrhagic transformation seen in the right temporal lobe. No significant change from prior brain MRI given differences in technique.  2.  Atrophy and chronic microvascular ischemic type changes.\"    Continues to have significant debility.  Severe dysphagia requiring cortrak and TF  Left side neglect and flaccidity   Hospice consulted.   Continue ASA and lipitor for now.  Family indecisive about aggressiveness of care  Hospice RN to follow up.   CM following           Electrolyte abnormality  Assessment & Plan  Improved.     Acute cystitis without hematuria  Assessment & Plan  S/p CTX  E coli    Tonsillar enlargement  Assessment & Plan  Incidental on CT  Will need further work-up depending on clinical recovery    Anemia, blood loss  Assessment & Plan  stable    Acute encephalopathy  Assessment & Plan  Secondary to stroke  Much improved.    Difficult to fully assess secondary to severe expressive aphasia.  Likely at new baseline.       DNR (do not resuscitate)- (present on admission)  Assessment & Plan  Hospice order placed  Family appears indecisive on aggressiveness of care with " limited understanding of hospice.  Son currently wants DNR, but still says he wants her to be intubated if necessary and then he would decide what to do at that point? Also wants hospice  Hospice RN to follow up with son.       Hyponatremia- (present on admission)  Assessment & Plan  Resolved.     Leukocytosis  Assessment & Plan  Resolved.     CAD (coronary artery disease)- (present on admission)  Assessment & Plan  Continue atorvastatin restarting aspirin      Falls frequently- (present on admission)  Assessment & Plan  No with left side neglect and flaccidity  PT/OT following.  Poor prognosis      Dementia- (present on admission)  Assessment & Plan  She does not seem very demented  Continue Aricept at this time       VTE prophylaxis: SCDs

## 2019-08-15 NOTE — PROGRESS NOTES
Monitor summary: SR 82-96, MD 0.12, QRS 0.06, QT 0.34, SR with frequent PVCs and 11 beats of Vtach per strip from monitor room.

## 2019-08-15 NOTE — PROGRESS NOTES
2 RN skin Check completed    Anjelica area excoriated, bright red from incontinence.  Sacral area pink but blanchable  Skin tear unchanged on left hand, dressing clean dry and intact, no drainage. Pink in color.   Multi-colored bruising noted all over extremities and trunk.   Abrasion noted to left top of foot, red in color    Interventions: Q2 turn and reposition, incontinence care provided as needed with barrier cream and pure wick in place. Left foot boot in place for footdrop, waffle mattress in place, TF for nutrition.

## 2019-08-16 PROBLEM — R13.10 DYSPHAGIA: Status: ACTIVE | Noted: 2019-08-16

## 2019-08-16 PROBLEM — D64.9 ANEMIA: Status: ACTIVE | Noted: 2019-08-09

## 2019-08-16 PROBLEM — E87.8 ELECTROLYTE ABNORMALITY: Status: RESOLVED | Noted: 2019-08-10 | Resolved: 2019-08-16

## 2019-08-16 PROBLEM — D72.829 LEUKOCYTOSIS: Status: RESOLVED | Noted: 2019-08-08 | Resolved: 2019-08-16

## 2019-08-16 PROBLEM — E87.1 HYPONATREMIA: Status: RESOLVED | Noted: 2019-08-08 | Resolved: 2019-08-16

## 2019-08-16 LAB — MAGNESIUM SERPL-MCNC: 1.9 MG/DL (ref 1.5–2.5)

## 2019-08-16 PROCEDURE — A9270 NON-COVERED ITEM OR SERVICE: HCPCS | Performed by: HOSPITALIST

## 2019-08-16 PROCEDURE — A9270 NON-COVERED ITEM OR SERVICE: HCPCS | Performed by: INTERNAL MEDICINE

## 2019-08-16 PROCEDURE — 700102 HCHG RX REV CODE 250 W/ 637 OVERRIDE(OP): Performed by: INTERNAL MEDICINE

## 2019-08-16 PROCEDURE — 99232 SBSQ HOSP IP/OBS MODERATE 35: CPT | Performed by: INTERNAL MEDICINE

## 2019-08-16 PROCEDURE — 36415 COLL VENOUS BLD VENIPUNCTURE: CPT

## 2019-08-16 PROCEDURE — 770006 HCHG ROOM/CARE - MED/SURG/GYN SEMI*

## 2019-08-16 PROCEDURE — 83735 ASSAY OF MAGNESIUM: CPT

## 2019-08-16 PROCEDURE — 700102 HCHG RX REV CODE 250 W/ 637 OVERRIDE(OP): Performed by: HOSPITALIST

## 2019-08-16 PROCEDURE — 92526 ORAL FUNCTION THERAPY: CPT

## 2019-08-16 RX ADMIN — ACETAMINOPHEN 650 MG: 325 TABLET, FILM COATED ORAL at 19:44

## 2019-08-16 RX ADMIN — DONEPEZIL HYDROCHLORIDE 10 MG: 5 TABLET, FILM COATED ORAL at 18:15

## 2019-08-16 RX ADMIN — ASPIRIN 81 MG 81 MG: 81 TABLET ORAL at 04:29

## 2019-08-16 RX ADMIN — ACETAMINOPHEN 650 MG: 325 TABLET, FILM COATED ORAL at 11:46

## 2019-08-16 RX ADMIN — LEVOTHYROXINE SODIUM 25 MCG: 25 TABLET ORAL at 04:29

## 2019-08-16 RX ADMIN — ATORVASTATIN CALCIUM 40 MG: 40 TABLET, FILM COATED ORAL at 21:26

## 2019-08-16 NOTE — DISCHARGE PLANNING
Anticipated Discharge Disposition:   Home with hospice  Group home with hospice    Action:    Spoke to Z[facility]ra with [facility] and they are unable to accept patient for half-way care as no payor source.      Spoke to patient's son, Mikael today and informed him of above.  Mikael stated he is confused about Medicare and  insurance paying for care.  He was here yesterday and he saw that pt was moving her right hand and scratched her nose.  He doesn't understand if patient may improve with swallowing and he is even thinking of a PEG.  He plans to be at hospital tomorrow around 1500 and would like to meet with SLP at bedside.    Spoke with Negra with [facility] and requested for her to please speak with patient about hospice again.      RN CM discussed Medicare,  for SNF but not for half-way care.  Discussed patient's abilities to participate and insurance will not pay or SNFs may not accept.  Discussed hospice at home.  He stated that patient receives monthly pension of $1319 and $1059 per month.  Requested to meet with him and hospice liason for Monday, 8-19-19.    Requested SLP/OT/PT to meet with patient's son at bedside tomorrow at approx 1500 as able please.    Barriers to Discharge:    Family decision making    Plan:      F/U with patient's son 8-19-19.  Care Transition Team Assessment    Information Source  Orientation : Disoriented to Event, Disoriented to Time  Information Given By: Patient, Relative  Who is responsible for making decisions for patient? : Legal next of kin  Name(s) of Primary Decision Maker: Mikael King    Readmission Evaluation  Is this a readmission?: No    Elopement Risk  Legal Hold: No  Ambulatory or Self Mobile in Wheelchair: No-Not an Elopement Risk  Elopement Risk: Not at Risk for Elopement    Interdisciplinary Discharge Planning  Lives with - Patient's Self Care Capacity: Adult Children  Patient or legal guardian wants to designate a caregiver (see  row info): No  Housing / Facility: Unable To Determine At This Time  Prior Services: Unable To Determine At This Time    Discharge Preparedness  What is your plan after discharge?: Uncertain - pending medical team collaboration  What are your discharge supports?: Child              Vision / Hearing Impairment  Vision Impairment : Yes  Right Eye Vision: Wears Glasses  Left Eye Vision: Wears Glasses  Hearing Impairment : No         Advance Directive  Advance Directive?: DPOA for Health Care    Domestic Abuse  Have you ever been the victim of abuse or violence?: No  Physical Abuse or Sexual Abuse: No  Verbal Abuse or Emotional Abuse: No  Possible Abuse Reported to:: Not Applicable

## 2019-08-16 NOTE — PROGRESS NOTES
2 RN skin check completed with Daryl RN:    -Scattered bruising    -R groin site bruising from thrombectomy    -L hand skin tear, transparent film dressing in place.    -Mepilex over sacral region, all areas blanching at this time.     -redness and moisture and excoriation between buttocks and gluteal folds and winston-area    -L foot healing scab/abrasion with mepilex lite in place.

## 2019-08-16 NOTE — CARE PLAN
Problem: Nutritional:  Goal: Nutrition support tolerated and meeting greater than 85% of estimated needs  Outcome: MET    TF has been at goal since 8/13.

## 2019-08-16 NOTE — PROGRESS NOTES
Monitor summary: SR 78-87, GA 0.14, QRS 0.08, QT 0.34, with rare PVCs, PACs, and bigeminy per strip from monitor room.

## 2019-08-16 NOTE — PROGRESS NOTES
Hospital Medicine Daily Progress Note    Date of Service  8/16/2019    Chief Complaint  Left-sided weakness and vision changes.     Hospital Course    This is an 88-year-old female with PMH significant for dementia, CAD and frequent falls who presented 8/15/2019 with GLF and left hemineglect.  Initial head CT on admission showed no acute findings.  MRI showed moderate-sized acute right MCA infarct with hemorrhagic transformation and she underwent thrombectomy.      Interval Problem Update  -decreased amount of free water flushes per dietary recommendations    Consultants/Specialty  -Palliative Care  -Hospice  -Neurology   -Pulmonary/Crticial Care - signed off    Code Status  DNR - intubation OK    Disposition  Hospice    Review of Systems  Review of Systems   Unable to perform ROS: Mental acuity (s/p CVA with expressive aphasia)        Physical Exam  Temp:  [36.1 °C (96.9 °F)-36.8 °C (98.2 °F)] 36.4 °C (97.6 °F)  Pulse:  [83-98] 95  Resp:  [16-19] 16  BP: (118-166)/(63-94) 131/78  SpO2:  [92 %-98 %] 92 %    Physical Exam   Constitutional: Vital signs are normal. She appears lethargic. She is sleeping and cooperative. She is easily aroused. She has a sickly appearance. No distress.   HENT:   Head: Normocephalic.   Right Ear: Hearing normal.   Left Ear: Hearing normal.   Nose: Nose normal.   Mouth/Throat: Oropharynx is clear and moist. Mucous membranes are dry and not cyanotic.   Cortrack left nare.   Eyes: Conjunctivae are normal. No scleral icterus. Left eye exhibits abnormal extraocular motion.   Neck: No JVD present.   Cardiovascular: Normal rate and intact distal pulses.   Murmur heard.  No edema   Pulmonary/Chest: No respiratory distress. She has decreased breath sounds. She has no wheezes.   Mild upper airway congestion.  Poor cough.    Abdominal: Soft. Normal appearance and bowel sounds are normal. She exhibits no distension. There is no tenderness. There is no rigidity and no guarding.   Musculoskeletal:    Left-sided flaccidity   Neurological: She is easily aroused. She appears lethargic. She displays atrophy. A cranial nerve deficit is present. She exhibits abnormal muscle tone. She displays no seizure activity. Coordination abnormal. GCS eye subscore is 3. GCS verbal subscore is 2. GCS motor subscore is 5.   RUE 4/5  RLE 4/5  LUE 0/5  LLE 0/5    Expressive aphasia/dysarthria     Skin: Skin is warm and dry.   Psychiatric: Her speech is delayed and slurred. She expresses impulsivity.   Nursing note and vitals reviewed.      Fluids    Intake/Output Summary (Last 24 hours) at 8/16/2019 1306  Last data filed at 8/16/2019 0800  Gross per 24 hour   Intake 1600 ml   Output 1200 ml   Net 400 ml       Laboratory      Recent Labs     08/14/19  0344   SODIUM 140   POTASSIUM 3.9   CHLORIDE 107   CO2 25   GLUCOSE 110*   BUN 14   CREATININE 0.59   CALCIUM 8.8                   Imaging  [id number]-1 VIEW   Final Result         1.  Nonspecific bowel gas pattern.   2.  Dobbhoff tube with tip terminating overlying the expected location of the first or second duodenal segment.      [id number]-[id number]-9 VIEW   Final Result         1.  Nonspecific bowel gas pattern.   2.  Dobbhoff tube tip terminates overlying the expected location of the gastric antrum.      CT-HEAD W/O   Final Result      1.  Acute right MCA infarcts with hemorrhagic transformation seen in the right temporal lobe. No significant change from prior brain MRI given differences in technique.   2.  Atrophy and chronic microvascular ischemic type changes.      EC-ECHOCARDIOGRAM COMPLETE W/O CONT   Final Result      [id number]-5 VIEW   Final Result         Feeding tube with tip projecting over the expected area of the distal stomach.      DX-ABDOMEN FOR TUBE PLACEMENT   Final Result      Feeding tube tip projects over the distal stomach or duodenal bulb.      DX-ABDOMEN FOR TUBE PLACEMENT   Final Result      1.  Feeding tube tip projects near the GE junction.      MR-BRAIN-W/O    Final Result      1.  Moderate-sized acute right MCA territory infarct with hemorrhagic transformation.   2.  Moderate diffuse cervical substance loss.   3.  Advanced microangiopathic ischemic change versus demyelination or gliosis.   4.  Chronic ischemic pontine gliosis.   5.  Findings of pansinusitis.      These findings were discussed with MORIS CANTU on 8/9/2019 2:50 PM. These findings were discussed with Dr. Mahendra Mann for Dr. MORIS CANTU on 8/9/2019 2:56 PM.      DX-CHEST-PORTABLE (1 VIEW)   Final Result      1.  No acute cardiopulmonary disease.   2.  Contrast present within mildly dilated LEFT renal collecting system.      IR-THROMBO MECHANICAL ARTERY,INIT   Final Result      1.  Occlusion of the right middle cerebral artery, M2 segment.      2.  Successful cerebral thrombectomy performed with post intervention angiogram demonstrating patent right middle cerebral artery vessels.      Findings were discussed with Dr. Miramontes at approximately 1755 hrs.      OUTSIDE IMAGES-CT HEAD   Final Result      OUTSIDE IMAGES-CT HEAD   Final Result      CT-CTA NECK WITH & W/O-POST PROCESSING   Final Result      Moderate calcified plaque right carotid artery bifurcation. No significant stenosis.   Left carotid arteries appear patent.   Vertebral arteries appear patent.   Focal enlargement of right lingual tonsils measuring 12.6 mm in diameter. Patient's condition permits, would consider further evaluation.      CT-CEREBRAL PERFUSION ANALYSIS   Final Result      1.  Cerebral blood flow less than 30% likely representing completed infarct = 38 mL.      2.  T Max more than 6 seconds likely representing combination of completed infarct and ischemia = 98 mL.      3.  Mismatched volume likely representing ischemic brain/penumbra = 60 mL      4.  Please note that the cerebral perfusion was performed on the limited brain tissue around the basal ganglia region. Infarct/ischemia outside the CT perfusion sections can be missed  in this study.      CT-CTA HEAD WITH & W/O-POST PROCESS    (Results Pending)        Assessment/Plan  * CVA (cerebral vascular accident) (HCC)- (present on admission)  Assessment & Plan  -Status post thrombectomy  -MRI with hemorrhagic transformation   -with dysphagia requiring Cortrack for tube feedings.   -with expressive aphasia  -with left-sided neglect and extremity flaccidity  -Palliative Care recommended Hospice consult  -ASA and statin  -CM/SS assisting with placement/Hospice/Family          Dysphagia  Assessment & Plan  -due to stroke  -SLP following  -NPO with Cortrack with tube feedings  -Aspiration Precautions     Acute cystitis without hematuria- (present on admission)  Assessment & Plan  -E. Coli per culture  -completed ABX 8/11/19  -unable to assess symptoms due to mentation  -afebrile    Tonsillar enlargement- (present on admission)  Assessment & Plan  -Incidental finding on CT  -Will need further work-up depending on clinical recovery    Anemia- (present on admission)  Assessment & Plan  -chronic and stable at her baseline      Acute encephalopathy- (present on admission)  Assessment & Plan  -likely multi-factorial - acute cystitis (s/p treatment), CVA, worsening baseline dementia, hospitalization  -arouses to voice. Garbled/Incomprehensible speech at baseline  -frequent reorientation, sleep hygiene  -Fall Precautions  -avoid benzos/narcotics      DNR (do not resuscitate)- (present on admission)  Assessment & Plan  -son would still want patient intubated. Ongoing education & counseling  -Hospice        CAD (coronary artery disease)- (present on admission)  Assessment & Plan  -continue ASA and statin      Falls frequently- (present on admission)  Assessment & Plan  -now s/p CVA with left-sided flaccidity  -plan to DC on Hospice  -Fall Precautions      Dementia- (present on admission)  Assessment & Plan  -difficult to ascertain her baseline  -continue Aricept  -frequent reorientation  -Fall  precautions  -Plan to DC to home or facility on hospice         VTE prophylaxis: SCD      ANTONIO Dodson.

## 2019-08-16 NOTE — PROGRESS NOTES
"Pt. with some disorientation to time and situation but able to follow commands appropriately. Pt. transferred to sit in recliner via 2 x assist to pivot. Pt. withdrawn and \"upset\" and states she wants to eat candy. Pt. educated about NPO status and moist swabs provided for comfort. Bed alarm on. Call light in reach. Hourly rounding in place.    "

## 2019-08-16 NOTE — PROGRESS NOTES
Monitor summary: SR-ST , AL .14, QRS .08, QT .34, with rare PVCs and PACs and 2.1 sec pause per strip from monitor room.

## 2019-08-16 NOTE — DIETARY
Nutrition Support Weekly Update: Day 8 of admit.  Dominique King is a 88 y.o. female with admitting DX of CVA (cerebral vascular accident).   Tube feeding initiated on 8/9. Current TF via Cortrak is with Replete with fiber at goal rate of 50 mL/hr.    Per SLP on 8/13, recommend continue NPO and TF.  However, if pt does transition to hospice the safest diet would be dysphagia 1 with nectar thick liquids.    Family is considering hospice, but currently unsure of plan.       Assessment:  Weight: 61.4 kg on 8/9, which is an increase of 3.9 kg since admit if bed scale weights are accurate.      Evaluation:   1. TF is currently providing 1200 kcals (20 kcals/kg), 77 gm protein (1.3 gm/kg) and 996 mL free water per day.     2. Labs: pre-alb was 9 on 8/12 with a CRP of 1.5.  TF was not at goal until 8/13  3. Pt is not on IVF at this time, gets 300 mL free water QID.  With TF at goal, pt is receiving 2196 mL fluids per day which seems excessive. Last CMP was 8/14, sodium was 140.  Estimated fluid needs are 1500 - 1600 mL per day.    4. Skin: per wound RN on 8/13, pt has a skin tear on her hand but no other skin issues  5. Last BM: pt has loose stools     Recommendations/Plan:  1. Continue with current TF formula and rate - Replete with fiber at 50 mL/hr.   2. Will await plan of care re: start po diet per SLP with hospice or continue TF.  3. Fluids per MD, please clarify     RD following

## 2019-08-16 NOTE — PROGRESS NOTES
2 RN skin check completed with Olivia ANNE. Pt has excoriated redness to winston area. Sacrum pink and blanching. Pt has scattered bruising to bilateral upper and lower extremities. Abrasions to L top of foot and skin tear to L hand, dressing in place CDI. All other skin WDL. Q2 turning and waffle overlay in place. Barrier cream in use. Purewick in place.

## 2019-08-16 NOTE — THERAPY
"Speech Language Therapy dysphagia treatment completed.   Functional Status: Pt seen at bedside, alert but slow to respond verbally, if at all. Pt did not make eye contact with this clinician, even when positioned on R at eye level. Pt eventually responded to orientation questions following extra time and mod cues, oriented to 3 spheres. Oral care completed prior to PO trials. Pt was presented with ice chips (5), NTL via 1/2-full tsp (1 oz), applesauce via 1/2 tsp (1 oz). Pharyngeal swallow response was delayed ~4s. Hyolaryngeal excursion palpated as weak. Pt occ swallowed twice per bolus. Pt did not follow directives to swallow a 2nd time, rarely followed directions to vocalize or open her mouth. Pt sneezed directly following PO intake, which can indicate laryngeal/nasopharyngeal irritation. Unable to fully assess vocal quality during PO intake due to limited command following (likely related to attention deficits). Attempted to train swallowing exercises with pt, though accuracy was poor for laryngeal elevation exercises (e.g., pt vocalized for one second at normal pitch vs. High pitch) and pt did not perform lingual presses in oral cavity despite mod-max verbal/tactile cues.   Recommendations: Continue NPO/TF; should POC change (per EMR, family considering hospice), Dysphagia 1/Nectar Thick Liquid diet with 1:1 feeding would help minimize but not eliminate risk of aspiration.  Plan of Care: Will benefit from Speech Therapy 5 times per week  Post-Acute Therapy: Recommend inpatient transitional care services for continued speech therapy services.        See \"Rehab Therapy-Acute\" Patient Summary Report for complete documentation.     "

## 2019-08-16 NOTE — PROGRESS NOTES
2 RN skin check completed with OMID Benito:  -Bruising and scabbing to L cheek bone.   -Bruising to upper chest.   -R groin site bruising   -BUE with scattered bruising  -L hand skin tear, transparent film dressing in place.  -Mepilex over sacral region, all areas blanching at this time.   -redness and moisture and excoriation between buttocks and gluteal folds and winston-area  -Scattered areas of bruising over L hip and thigh.  -L foot healing scab/abrasion with mepilex lite in place.      Waffle mattress overlay in place. Skin under SCD's and foot brace intact. No skin breakdown noted around cortrak site. Q2H turns in place.

## 2019-08-17 LAB — MAGNESIUM SERPL-MCNC: 1.9 MG/DL (ref 1.5–2.5)

## 2019-08-17 PROCEDURE — A9270 NON-COVERED ITEM OR SERVICE: HCPCS | Performed by: HOSPITALIST

## 2019-08-17 PROCEDURE — 36415 COLL VENOUS BLD VENIPUNCTURE: CPT

## 2019-08-17 PROCEDURE — 770006 HCHG ROOM/CARE - MED/SURG/GYN SEMI*

## 2019-08-17 PROCEDURE — 700102 HCHG RX REV CODE 250 W/ 637 OVERRIDE(OP): Performed by: HOSPITALIST

## 2019-08-17 PROCEDURE — A9270 NON-COVERED ITEM OR SERVICE: HCPCS | Performed by: INTERNAL MEDICINE

## 2019-08-17 PROCEDURE — 99231 SBSQ HOSP IP/OBS SF/LOW 25: CPT | Performed by: INTERNAL MEDICINE

## 2019-08-17 PROCEDURE — 302255 BARRIER CREAM MOISTURE BAZA PROTECT (ZINC) 5OZ: Performed by: INTERNAL MEDICINE

## 2019-08-17 PROCEDURE — 700102 HCHG RX REV CODE 250 W/ 637 OVERRIDE(OP): Performed by: INTERNAL MEDICINE

## 2019-08-17 PROCEDURE — 83735 ASSAY OF MAGNESIUM: CPT

## 2019-08-17 RX ADMIN — ATORVASTATIN CALCIUM 40 MG: 40 TABLET, FILM COATED ORAL at 21:40

## 2019-08-17 RX ADMIN — ASPIRIN 81 MG 81 MG: 81 TABLET ORAL at 06:00

## 2019-08-17 RX ADMIN — DONEPEZIL HYDROCHLORIDE 10 MG: 5 TABLET, FILM COATED ORAL at 16:51

## 2019-08-17 RX ADMIN — ACETAMINOPHEN 650 MG: 325 TABLET, FILM COATED ORAL at 21:43

## 2019-08-17 RX ADMIN — LEVOTHYROXINE SODIUM 25 MCG: 25 TABLET ORAL at 06:00

## 2019-08-17 NOTE — CARE PLAN
Problem: Safety  Goal: Will remain free from injury  Outcome: PROGRESSING AS EXPECTED     Problem: Communication  Goal: The ability to communicate needs accurately and effectively will improve  Outcome: PROGRESSING SLOWER THAN EXPECTED  Note:   Hourly rounding in place.

## 2019-08-17 NOTE — PROGRESS NOTES
Pt AO x4, on RA, NPO with replete fiber running at goal rate of 50 ml/hr with 200 ml  Water flushes. q2h turn in place, alternate boots w/ every turn. Incontinent x2, purewick in place, hold senna for loose BM. All fall precautions in place.

## 2019-08-17 NOTE — PROGRESS NOTES
2 RN skin check completed with OMID Colin.    Skin tear on top of L foot  -foam dressing in place.    Skin tear on L hand.  -transparent dressing in place.    Red excoriated groin.  -barrier cream in use.    Scattered bruising and abrasions.    Sacrum pink and blanching.  -Q2 turns and waffle cushion in place.

## 2019-08-17 NOTE — PROGRESS NOTES
2 RN skin check    Incontinence dermatitis noted  Skin tear L foot  Skin tear L had w/ dsg in place  Scattered bruising to BUE and L face.

## 2019-08-17 NOTE — PROGRESS NOTES
Pt A/O x3 with left sided neglect. Heel drop boot being alternated [id number] with turns. Bed alarm on. SCDs on pt. Hourly rounding in place.

## 2019-08-17 NOTE — CARE PLAN
Problem: Safety  Goal: Will remain free from injury  Outcome: PROGRESSING AS EXPECTED     Problem: Infection  Goal: Will remain free from infection  Outcome: PROGRESSING AS EXPECTED     Problem: Venous Thromboembolism (VTW)/Deep Vein Thrombosis (DVT) Prevention:  Goal: Patient will participate in Venous Thrombosis (VTE)/Deep Vein Thrombosis (DVT)Prevention Measures  Outcome: PROGRESSING AS EXPECTED     Problem: Urinary Elimination:  Goal: Ability to reestablish a normal urinary elimination pattern will improve  Outcome: PROGRESSING SLOWER THAN EXPECTED

## 2019-08-17 NOTE — PROGRESS NOTES
Hospital Medicine Daily Progress Note    Date of Service  8/17/2019    Chief Complaint  Left-sided weakness and vision changes.     Hospital Course   This is an 88-year-old female with PMH significant for dementia, CAD and frequent falls who presented 8/15/2019 with GLF and left hemineglect.  Initial head CT on admission showed no acute findings.  MRI showed moderate-sized acute right MCA infarct with hemorrhagic transformation and she underwent thrombectomy.      Interval Problem Update  -no acute changes overnight  -patient more alert today. Left-sided neglect. Minimal verbal interaction. Does not follow commands.    Consultants/Specialty  -Pulmonary/Crticial Care - signed off  -Palliative Care  -Hospice  -Neurology     Code Status  DNAR - OK for intubation    Disposition  Hospice    Review of Systems  Review of Systems   Unable to perform ROS: Mental acuity (s/p CVA with expressive aphasia)        Physical Exam  Temp:  [36.1 °C (96.9 °F)-36.5 °C (97.7 °F)] 36.1 °C (96.9 °F)  Pulse:  [] 100  Resp:  [16-18] 16  BP: (118-148)/(73-83) 132/82  SpO2:  [92 %-100 %] 100 %    Physical Exam   Constitutional: Vital signs are normal. She appears lethargic. She is sleeping and cooperative. She is easily aroused. She has a sickly appearance. No distress.   HENT:   Head: Normocephalic.   Right Ear: Hearing normal.   Left Ear: Hearing normal.   Nose: Nose normal.   Mouth/Throat: Oropharynx is clear and moist. Mucous membranes are dry and not cyanotic.   Cortrack left nare.   Eyes: Conjunctivae are normal. No scleral icterus. Left eye exhibits abnormal extraocular motion.   Neck: No JVD present.   Cardiovascular: Normal rate and intact distal pulses.   Murmur heard.  No edema   Pulmonary/Chest: No respiratory distress. She has decreased breath sounds. She has no wheezes.   Mild upper airway congestion.  Poor cough.    Abdominal: Soft. Normal appearance and bowel sounds are normal. She exhibits no distension. There is no  tenderness. There is no rigidity and no guarding.   Musculoskeletal:   Left-sided flaccidity   Neurological: She is easily aroused. She appears lethargic. She displays atrophy. A cranial nerve deficit is present. She exhibits abnormal muscle tone. She displays no seizure activity. Coordination abnormal. GCS eye subscore is 3. GCS verbal subscore is 2. GCS motor subscore is 5.   RUE 4/5  RLE 4/5  LUE 1/5 - was noted to move her left arm below the elbow today  LLE 0/5    Expressive aphasia/dysarthria     Skin: Skin is warm and dry.   Psychiatric: Her speech is delayed and slurred.   Nursing note and vitals reviewed.      Fluids    Intake/Output Summary (Last 24 hours) at 8/17/2019 1255  Last data filed at 8/17/2019 0800  Gross per 24 hour   Intake 1380 ml   Output --   Net 1380 ml       Laboratory                        Imaging  [id number] VIEW   Final Result         1.  Nonspecific bowel gas pattern.   2.  Dobbhoff tube with tip terminating overlying the expected location of the first or second duodenal segment.      [id number]-0 VIEW   Final Result         1.  Nonspecific bowel gas pattern.   2.  Dobbhoff tube tip terminates overlying the expected location of the gastric antrum.      CT-HEAD W/O   Final Result      1.  Acute right MCA infarcts with hemorrhagic transformation seen in the right temporal lobe. No significant change from prior brain MRI given differences in technique.   2.  Atrophy and chronic microvascular ischemic type changes.      EC-ECHOCARDIOGRAM COMPLETE W/O CONT   Final Result      [id number]-XYHIALZ-2 VIEW   Final Result         Feeding tube with tip projecting over the expected area of the distal stomach.      DX-ABDOMEN FOR TUBE PLACEMENT   Final Result      Feeding tube tip projects over the distal stomach or duodenal bulb.      DX-ABDOMEN FOR TUBE PLACEMENT   Final Result      1.  Feeding tube tip projects near the GE junction.      MR-BRAIN-W/O   Final Result      1.  Moderate-sized acute right  MCA territory infarct with hemorrhagic transformation.   2.  Moderate diffuse cervical substance loss.   3.  Advanced microangiopathic ischemic change versus demyelination or gliosis.   4.  Chronic ischemic pontine gliosis.   5.  Findings of pansinusitis.      These findings were discussed with MORIS CANTU on 8/9/2019 2:50 PM. These findings were discussed with Dr. Mahendra Mann for Dr. MORIS CANTU on 8/9/2019 2:56 PM.      DX-CHEST-PORTABLE (1 VIEW)   Final Result      1.  No acute cardiopulmonary disease.   2.  Contrast present within mildly dilated LEFT renal collecting system.      IR-THROMBO MECHANICAL ARTERY,INIT   Final Result      1.  Occlusion of the right middle cerebral artery, M2 segment.      2.  Successful cerebral thrombectomy performed with post intervention angiogram demonstrating patent right middle cerebral artery vessels.      Findings were discussed with Dr. Miramontes at approximately 1755 hrs.      OUTSIDE IMAGES-CT HEAD   Final Result      OUTSIDE IMAGES-CT HEAD   Final Result      CT-CTA NECK WITH & W/O-POST PROCESSING   Final Result      Moderate calcified plaque right carotid artery bifurcation. No significant stenosis.   Left carotid arteries appear patent.   Vertebral arteries appear patent.   Focal enlargement of right lingual tonsils measuring 12.6 mm in diameter. Patient's condition permits, would consider further evaluation.      CT-CEREBRAL PERFUSION ANALYSIS   Final Result      1.  Cerebral blood flow less than 30% likely representing completed infarct = 38 mL.      2.  T Max more than 6 seconds likely representing combination of completed infarct and ischemia = 98 mL.      3.  Mismatched volume likely representing ischemic brain/penumbra = 60 mL      4.  Please note that the cerebral perfusion was performed on the limited brain tissue around the basal ganglia region. Infarct/ischemia outside the CT perfusion sections can be missed in this study.      CT-CTA HEAD WITH & W/O-POST  PROCESS    (Results Pending)        Assessment/Plan  * CVA (cerebral vascular accident) (HCC)- (present on admission)  Assessment & Plan  -Status post thrombectomy  -MRI with hemorrhagic transformation   -dysphagia requiring Cortrack for tube feedings.   -expressive aphasia  -left-sided neglect and extremity flaccidity  -Palliative Care recommended Hospice consult  -ASA and statin  -CM/SS assisting with placement/Hospice/Family          Dysphagia  Assessment & Plan  -due to stroke  -SLP following  -NPO with Cortrack with tube feedings  -Aspiration Precautions     Acute cystitis without hematuria- (present on admission)  Assessment & Plan  -E. Coli   -completed ABX 8/11/19  -unable to assess symptoms due to mentation  -afebrile    Tonsillar enlargement- (present on admission)  Assessment & Plan  -Incidental finding on CT  -Will need further work-up depending on clinical recovery    Anemia- (present on admission)  Assessment & Plan  -chronic and stable at her baseline      Acute encephalopathy- (present on admission)  Assessment & Plan  -likely multi-factorial - acute cystitis (s/p treatment), CVA, worsening baseline dementia, hospitalization  -arouses to voice. Garbled/Incomprehensible speech at baseline  -frequent reorientation, sleep hygiene  -Fall Precautions  -avoid benzos/narcotics      DNR (do not resuscitate)- (present on admission)  Assessment & Plan  -son would still want patient intubated. Ongoing education & counseling  -Hospice        CAD (coronary artery disease)- (present on admission)  Assessment & Plan  -continue ASA and statin      Falls frequently- (present on admission)  Assessment & Plan  -now s/p CVA with left-sided flaccidity  -plan to DC on Hospice  -Fall Precautions      Dementia- (present on admission)  Assessment & Plan  -difficult to ascertain her baseline  -continue Aricept  -frequent reorientation  -Fall precautions  -Plan to DC to home or facility on hospice         VTE prophylaxis:  SCD    NARCISO Dodson    ATTESTATION:    I have independently seen and examined Dominique King. Dominique King is a 88 y.o.  female admitted 8/8/2019 with CVA (cerebral vascular accident) (HCC)  CVA (cerebral vascular accident) (HCC).   No new issues or complaints. Not in any discomfort. Resting.  Vitals were reviewed.  Elderly, frail  Cortrak in place  L neglect, dysarthria and dysphagia, unchanged  Strength somewhat improved.  All new labs and imaging studies were reviewed. Nursing notes reviewed. Chart and APN's note reviewed. Case was discussed in detail with the nurse practitioner and plan of care formulated with the provider.  I attest to and agree with the mid-level provider's note, including assessment, plan, and physical exam.   Hospice and Palliative to reevaluate goals of care. Ongoing.

## 2019-08-18 LAB — MAGNESIUM SERPL-MCNC: 1.9 MG/DL (ref 1.5–2.5)

## 2019-08-18 PROCEDURE — 770006 HCHG ROOM/CARE - MED/SURG/GYN SEMI*

## 2019-08-18 PROCEDURE — 700102 HCHG RX REV CODE 250 W/ 637 OVERRIDE(OP): Performed by: NURSE PRACTITIONER

## 2019-08-18 PROCEDURE — 302255 BARRIER CREAM MOISTURE BAZA PROTECT (ZINC) 5OZ: Performed by: INTERNAL MEDICINE

## 2019-08-18 PROCEDURE — A9270 NON-COVERED ITEM OR SERVICE: HCPCS | Performed by: HOSPITALIST

## 2019-08-18 PROCEDURE — 83735 ASSAY OF MAGNESIUM: CPT

## 2019-08-18 PROCEDURE — 36415 COLL VENOUS BLD VENIPUNCTURE: CPT

## 2019-08-18 PROCEDURE — A9270 NON-COVERED ITEM OR SERVICE: HCPCS | Performed by: INTERNAL MEDICINE

## 2019-08-18 PROCEDURE — A9270 NON-COVERED ITEM OR SERVICE: HCPCS | Performed by: NURSE PRACTITIONER

## 2019-08-18 PROCEDURE — 700102 HCHG RX REV CODE 250 W/ 637 OVERRIDE(OP): Performed by: INTERNAL MEDICINE

## 2019-08-18 PROCEDURE — 99232 SBSQ HOSP IP/OBS MODERATE 35: CPT | Performed by: INTERNAL MEDICINE

## 2019-08-18 PROCEDURE — 700102 HCHG RX REV CODE 250 W/ 637 OVERRIDE(OP): Performed by: HOSPITALIST

## 2019-08-18 RX ORDER — ACETAMINOPHEN 160 MG/5ML
500 SUSPENSION ORAL EVERY 6 HOURS
Status: DISCONTINUED | OUTPATIENT
Start: 2019-08-18 | End: 2019-08-18

## 2019-08-18 RX ORDER — ACETAMINOPHEN 500 MG
500 TABLET ORAL EVERY 6 HOURS
Status: DISCONTINUED | OUTPATIENT
Start: 2019-08-18 | End: 2019-08-28 | Stop reason: HOSPADM

## 2019-08-18 RX ADMIN — LEVOTHYROXINE SODIUM 25 MCG: 25 TABLET ORAL at 04:44

## 2019-08-18 RX ADMIN — ACETAMINOPHEN 500 MG: 500 TABLET ORAL at 13:07

## 2019-08-18 RX ADMIN — ACETAMINOPHEN 500 MG: 500 TABLET ORAL at 16:22

## 2019-08-18 RX ADMIN — DONEPEZIL HYDROCHLORIDE 10 MG: 5 TABLET, FILM COATED ORAL at 16:22

## 2019-08-18 RX ADMIN — ASPIRIN 81 MG 81 MG: 81 TABLET ORAL at 04:44

## 2019-08-18 RX ADMIN — ATORVASTATIN CALCIUM 40 MG: 40 TABLET, FILM COATED ORAL at 19:31

## 2019-08-18 ASSESSMENT — ENCOUNTER SYMPTOMS
DIARRHEA: 0
DOUBLE VISION: 1
PALPITATIONS: 0
ABDOMINAL PAIN: 0
HEADACHES: 1
VOMITING: 0
SHORTNESS OF BREATH: 0

## 2019-08-18 NOTE — CARE PLAN
Problem: Bowel/Gastric:  Goal: Normal bowel function is maintained or improved  Outcome: PROGRESSING SLOWER THAN EXPECTED     Problem: Skin Integrity  Goal: Risk for impaired skin integrity will decrease  Outcome: PROGRESSING SLOWER THAN EXPECTED  Note:   Barrier cream in use. [id number] and rotating heel float boot and heel drop boot [id number].

## 2019-08-18 NOTE — PROGRESS NOTES
2 RN skin check with OMID Ugalde.    Skin tear on L foot   - foam dressing in place.  Skin tear on L hand   - transparent mepitel in place  Scattered bruising and abrasions  Scab on L face.   Excoriated, red groin.   - reena barrier cream in use. Q2hour linen changes in place.

## 2019-08-18 NOTE — PROGRESS NOTES
"Hospital Medicine Daily Progress Note    Date of Service  8/18/2019    Chief Complaint  Left-sided weakness and vision changes    Hospital Course   This is an 88-year-old female with PMH significant for dementia, CAD and frequent falls who presented 8/15/2019 with GLF and left hemineglect.  Initial head CT on admission showed no acute findings.  MRI showed moderate-sized acute right MCA infarct with hemorrhagic transformation and she underwent thrombectomy.      Interval Problem Update  8/18 - sleeping during rounds. Arouses to voice. Closes her right eye to focus on me. \"August\" to month and \"right here\" to location. \"Yes\" to pain \"in my head\". I've ordered Tylenol.   -no acute neurological changes. No acute events overnight.     Consultants/Specialty  -Hospice  -Neurology   -Pulmonary/Crticial Care - signed off  -Palliative Care    Code Status  DNAR - OK for intubation    Disposition  Medically cleared to DC once son has decided on Hospice and placement.    Review of Systems  Review of Systems   Unable to perform ROS: Mental acuity (Limited )   Eyes: Positive for double vision.   Respiratory: Negative for shortness of breath.    Cardiovascular: Negative for chest pain and palpitations.   Gastrointestinal: Negative for abdominal pain, diarrhea and vomiting.   Neurological: Positive for headaches.        Physical Exam  Temp:  [36.1 °C (97 °F)-36.7 °C (98.1 °F)] 36.7 °C (98.1 °F)  Pulse:  [81-97] 81  Resp:  [16] 16  BP: (114-141)/(67-80) 141/76  SpO2:  [92 %-97 %] 94 %    Physical Exam   Constitutional: Vital signs are normal. She appears lethargic. She is sleeping and cooperative. She is easily aroused. She has a sickly appearance. No distress.   HENT:   Head: Normocephalic.   Right Ear: Hearing normal.   Left Ear: Hearing normal.   Nose: Nose normal.   Mouth/Throat: Oropharynx is clear and moist. Mucous membranes are dry and not cyanotic.   Cortrack left nare.   Eyes: Conjunctivae are normal. No scleral icterus. " Left eye exhibits abnormal extraocular motion.   Neck: No JVD present.   Cardiovascular: Normal rate and intact distal pulses.   Murmur heard.  No edema   Pulmonary/Chest: No respiratory distress. She has decreased breath sounds. She has no wheezes.   Mild upper airway congestion.  Poor cough.    Abdominal: Soft. Normal appearance and bowel sounds are normal. She exhibits no distension. There is no tenderness. There is no rigidity and no guarding.   Musculoskeletal:   Left-sided flaccidity   Neurological: She is easily aroused. She appears lethargic. She displays atrophy. A cranial nerve deficit is present. She exhibits abnormal muscle tone. She displays no seizure activity. Coordination abnormal. GCS eye subscore is 3. GCS verbal subscore is 2. GCS motor subscore is 5.   RUE 4/5  RLE 4/5  LUE 0/5  LLE 0/5       Skin: Skin is warm and dry.   Psychiatric: Her speech is delayed. She exhibits abnormal remote memory.   Nursing note and vitals reviewed.      Fluids    Intake/Output Summary (Last 24 hours) at 8/18/2019 1243  Last data filed at 8/18/2019 1200  Gross per 24 hour   Intake 2100 ml   Output --   Net 2100 ml       Laboratory                        Imaging  [id number] VIEW   Final Result         1.  Nonspecific bowel gas pattern.   2.  Dobbhoff tube with tip terminating overlying the expected location of the first or second duodenal segment.      [id number] VIEW   Final Result         1.  Nonspecific bowel gas pattern.   2.  Dobbhoff tube tip terminates overlying the expected location of the gastric antrum.      CT-HEAD W/O   Final Result      1.  Acute right MCA infarcts with hemorrhagic transformation seen in the right temporal lobe. No significant change from prior brain MRI given differences in technique.   2.  Atrophy and chronic microvascular ischemic type changes.      EC-ECHOCARDIOGRAM COMPLETE W/O CONT   Final Result      [id number]-5 VIEW   Final Result         Feeding tube with tip projecting over  the expected area of the distal stomach.      DX-ABDOMEN FOR TUBE PLACEMENT   Final Result      Feeding tube tip projects over the distal stomach or duodenal bulb.      DX-ABDOMEN FOR TUBE PLACEMENT   Final Result      1.  Feeding tube tip projects near the GE junction.      MR-BRAIN-W/O   Final Result      1.  Moderate-sized acute right MCA territory infarct with hemorrhagic transformation.   2.  Moderate diffuse cervical substance loss.   3.  Advanced microangiopathic ischemic change versus demyelination or gliosis.   4.  Chronic ischemic pontine gliosis.   5.  Findings of pansinusitis.      These findings were discussed with MORIS CANTU on 8/9/2019 2:50 PM. These findings were discussed with Dr. Mahendra Mann for Dr. MORIS CANTU on 8/9/2019 2:56 PM.      DX-CHEST-PORTABLE (1 VIEW)   Final Result      1.  No acute cardiopulmonary disease.   2.  Contrast present within mildly dilated LEFT renal collecting system.      IR-THROMBO MECHANICAL ARTERY,INIT   Final Result      1.  Occlusion of the right middle cerebral artery, M2 segment.      2.  Successful cerebral thrombectomy performed with post intervention angiogram demonstrating patent right middle cerebral artery vessels.      Findings were discussed with Dr. Miramontes at approximately 1755 hrs.      OUTSIDE IMAGES-CT HEAD   Final Result      OUTSIDE IMAGES-CT HEAD   Final Result      CT-CTA NECK WITH & W/O-POST PROCESSING   Final Result      Moderate calcified plaque right carotid artery bifurcation. No significant stenosis.   Left carotid arteries appear patent.   Vertebral arteries appear patent.   Focal enlargement of right lingual tonsils measuring 12.6 mm in diameter. Patient's condition permits, would consider further evaluation.      CT-CEREBRAL PERFUSION ANALYSIS   Final Result      1.  Cerebral blood flow less than 30% likely representing completed infarct = 38 mL.      2.  T Max more than 6 seconds likely representing combination of completed infarct  and ischemia = 98 mL.      3.  Mismatched volume likely representing ischemic brain/penumbra = 60 mL      4.  Please note that the cerebral perfusion was performed on the limited brain tissue around the basal ganglia region. Infarct/ischemia outside the CT perfusion sections can be missed in this study.      CT-CTA HEAD WITH & W/O-POST PROCESS    (Results Pending)        Assessment/Plan  * CVA (cerebral vascular accident) (HCC)- (present on admission)  Assessment & Plan  -Status post thrombectomy  -MRI with hemorrhagic transformation   -dysphagia requiring Cortrack for tube feedings.   -expressive aphasia  -left-sided neglect and extremity flaccidity  -c/o headaches today  -Palliative Care recommended Hospice consult  -ASA and statin  -CM/SS assisting with placement/Hospice/Family          Dysphagia  Assessment & Plan  -due to stroke  -SLP following  -NPO with Cortrack with tube feedings  -Aspiration Precautions     Acute cystitis without hematuria- (present on admission)  Assessment & Plan  -E. Coli   -completed ABX 8/11/19  -afebrile    Tonsillar enlargement- (present on admission)  Assessment & Plan  -Incidental finding on CT  -Will need further work-up depending on clinical recovery    Anemia- (present on admission)  Assessment & Plan  -chronic and stable at her baseline      Acute encephalopathy- (present on admission)  Assessment & Plan  -likely multi-factorial - acute cystitis (s/p treatment), CVA, worsening baseline dementia, hospitalization  -arouses to voice today. Answers orientation questions correctly.   -no behavioral outbursts  -frequent reorientation, sleep hygiene  -Fall Precautions  -avoid benzos/narcotics      DNR (do not resuscitate)- (present on admission)  Assessment & Plan  -son would still want patient intubated. Ongoing education & counseling  -Hospice        CAD (coronary artery disease)- (present on admission)  Assessment & Plan  -continue ASA and statin      Falls frequently- (present on  admission)  Assessment & Plan  -now s/p CVA with left-sided flaccidity  -plan to DC on Hospice  -Fall Precautions      Dementia- (present on admission)  Assessment & Plan  -difficult to ascertain her baseline  -continue Aricept  -frequent reorientation  -Fall precautions  -Plan to DC to home or facility on hospice         VTE prophylaxis: SCD    ANTONIO Dodson.

## 2019-08-18 NOTE — PROGRESS NOTES
Pt AO x4, on RA, NPO with replete fiber running at goal rate of 50 ml/hr with 200 ml water flushes. q2h turn in place, alternate boots w/ every turn. Incontinent x2, using reena ointment as needed. Hold senna for loose BM. All fall precautions in place.

## 2019-08-19 PROBLEM — R19.7 DIARRHEA OF PRESUMED INFECTIOUS ORIGIN: Status: ACTIVE | Noted: 2019-08-19

## 2019-08-19 LAB
C DIFF DNA SPEC QL NAA+PROBE: NEGATIVE
C DIFF TOX GENS STL QL NAA+PROBE: NEGATIVE
CRP SERPL HS-MCNC: 0.25 MG/DL (ref 0–0.75)
PREALB SERPL-MCNC: 18 MG/DL (ref 18–38)

## 2019-08-19 PROCEDURE — 87493 C DIFF AMPLIFIED PROBE: CPT

## 2019-08-19 PROCEDURE — 700102 HCHG RX REV CODE 250 W/ 637 OVERRIDE(OP): Performed by: NURSE PRACTITIONER

## 2019-08-19 PROCEDURE — A9270 NON-COVERED ITEM OR SERVICE: HCPCS | Performed by: HOSPITALIST

## 2019-08-19 PROCEDURE — 700102 HCHG RX REV CODE 250 W/ 637 OVERRIDE(OP): Performed by: HOSPITALIST

## 2019-08-19 PROCEDURE — A9270 NON-COVERED ITEM OR SERVICE: HCPCS | Performed by: INTERNAL MEDICINE

## 2019-08-19 PROCEDURE — 86140 C-REACTIVE PROTEIN: CPT

## 2019-08-19 PROCEDURE — 770021 HCHG ROOM/CARE - ISO PRIVATE

## 2019-08-19 PROCEDURE — 99232 SBSQ HOSP IP/OBS MODERATE 35: CPT | Performed by: INTERNAL MEDICINE

## 2019-08-19 PROCEDURE — 36415 COLL VENOUS BLD VENIPUNCTURE: CPT

## 2019-08-19 PROCEDURE — 700102 HCHG RX REV CODE 250 W/ 637 OVERRIDE(OP): Performed by: INTERNAL MEDICINE

## 2019-08-19 PROCEDURE — A9270 NON-COVERED ITEM OR SERVICE: HCPCS | Performed by: NURSE PRACTITIONER

## 2019-08-19 PROCEDURE — 84134 ASSAY OF PREALBUMIN: CPT

## 2019-08-19 RX ADMIN — ASPIRIN 81 MG 81 MG: 81 TABLET ORAL at 05:08

## 2019-08-19 RX ADMIN — DONEPEZIL HYDROCHLORIDE 10 MG: 5 TABLET, FILM COATED ORAL at 17:50

## 2019-08-19 RX ADMIN — LEVOTHYROXINE SODIUM 25 MCG: 25 TABLET ORAL at 05:08

## 2019-08-19 RX ADMIN — ACETAMINOPHEN 500 MG: 500 TABLET ORAL at 05:08

## 2019-08-19 RX ADMIN — ACETAMINOPHEN 500 MG: 500 TABLET ORAL at 23:19

## 2019-08-19 RX ADMIN — ACETAMINOPHEN 500 MG: 500 TABLET ORAL at 17:50

## 2019-08-19 RX ADMIN — ACETAMINOPHEN 500 MG: 500 TABLET ORAL at 11:37

## 2019-08-19 RX ADMIN — ATORVASTATIN CALCIUM 40 MG: 40 TABLET, FILM COATED ORAL at 23:19

## 2019-08-19 RX ADMIN — ACETAMINOPHEN 500 MG: 500 TABLET ORAL at 00:21

## 2019-08-19 NOTE — PROGRESS NOTES
2 RN skin check    Incontinence dermatitis noted  Skin tear L foot  Scab on L knee  Skin tear L had w/ dsg in place  Scattered bruising to BUE and L face.

## 2019-08-19 NOTE — DISCHARGE PLANNING
Received Choice form at 1250.  Agency/Facility Name: [facility]   Referral sent per Choice form at 1253.

## 2019-08-19 NOTE — PROGRESS NOTES
2 RN skin check  Skin tear L foot  Incontinence associated dermatits in [place] area  Skin tear L hand.

## 2019-08-19 NOTE — THERAPY
Speech Therapy Contact Note    Spoke to RN who reports family is not yet at bedside but will page SLP if and when family does arrive today so that education can be provided.

## 2019-08-19 NOTE — PROGRESS NOTES
2 RN skin check completed with OMID Pacheco.  -Perineal area red due to incontinence associated dermatitis  -Skin tear to left hand and foot  -Scattered bruises to BLE    Waffle overlay mattress in use, Q2 turns, barrier cream in use, rotating heel float boot and foot drop boot Q2.

## 2019-08-19 NOTE — PROGRESS NOTES
"Hospital Medicine Daily Progress Note    Date of Service  8/19/2019    Chief Complaint  Left-sided weakness and vision changes    Hospital Course   This is an 88-year-old female with PMH significant for dementia, CAD and frequent falls who presented 8/15/2019 with GLF and left hemineglect.  Initial head CT on admission showed no acute findings.  MRI showed moderate-sized acute right MCA infarct with hemorrhagic transformation and she underwent thrombectomy.      Interval Problem Update  8/19 - 3 loose foul-smelling diarrhea stools today. CDIFF r/o in process  -no acute changes. Waiting for son to speak with Hospice to decide care/DC plan    8/18 - sleeping during rounds. Arouses to voice. Closes her right eye to focus on me. \"August\" to month and \"right here\" to location. \"Yes\" to pain \"in my head\". I've ordered Tylenol.   -no acute neurological changes. No acute events overnight.     Consultants/Specialty  -Pulmonary/Crticial Care - signed off  -Palliative Care  -Hospice  -Neurology     Code Status  DNAR - Intubation OK    Disposition  Either to a facility or home with Hospice.    Review of Systems  Review of Systems   Unable to perform ROS: Mental acuity (Limited )        Physical Exam  Temp:  [36.1 °C (96.9 °F)-36.3 °C (97.3 °F)] 36.1 °C (96.9 °F)  Pulse:  [89-93] 89  Resp:  [16-17] 17  BP: (121-129)/(75-80) 123/77  SpO2:  [91 %-93 %] 91 %    Physical Exam   Constitutional: Vital signs are normal. She appears lethargic. She is sleeping and cooperative. She is easily aroused. She has a sickly appearance. No distress.   HENT:   Head: Normocephalic.   Right Ear: Hearing normal.   Left Ear: Hearing normal.   Nose: Nose normal.   Mouth/Throat: Oropharynx is clear and moist. Mucous membranes are dry and not cyanotic.   Cortrack left nare.   Eyes: Conjunctivae are normal. No scleral icterus. Left eye exhibits abnormal extraocular motion.   Neck: No JVD present.   Cardiovascular: Normal rate and intact distal pulses. "   Murmur heard.  No edema   Pulmonary/Chest: No respiratory distress. She has decreased breath sounds. She has no wheezes.   Mild upper airway congestion.  Poor cough.    Abdominal: Soft. Normal appearance and bowel sounds are normal. She exhibits no distension. There is no tenderness. There is no rigidity and no guarding.   Incontinent diarrhea stools   Musculoskeletal:   Left-sided flaccidity   Neurological: She is easily aroused. She appears lethargic. She displays atrophy. A cranial nerve deficit is present. She exhibits abnormal muscle tone. She displays no seizure activity. Coordination abnormal. GCS eye subscore is 3. GCS verbal subscore is 2. GCS motor subscore is 5.   RUE 4/5  RLE 4/5  LUE 0/5  LLE 0/5       Skin: Skin is warm and dry. No rash noted.   Psychiatric: Her speech is delayed. She exhibits abnormal remote memory.   Nursing note and vitals reviewed.      Fluids    Intake/Output Summary (Last 24 hours) at 8/19/2019 1605  Last data filed at 8/19/2019 1140  Gross per 24 hour   Intake 1400 ml   Output --   Net 1400 ml       Laboratory                        Imaging  [id number] VIEW   Final Result         1.  Nonspecific bowel gas pattern.   2.  Dobbhoff tube with tip terminating overlying the expected location of the first or second duodenal segment.      [id number] VIEW   Final Result         1.  Nonspecific bowel gas pattern.   2.  Dobbhoff tube tip terminates overlying the expected location of the gastric antrum.      CT-HEAD W/O   Final Result      1.  Acute right MCA infarcts with hemorrhagic transformation seen in the right temporal lobe. No significant change from prior brain MRI given differences in technique.   2.  Atrophy and chronic microvascular ischemic type changes.      EC-ECHOCARDIOGRAM COMPLETE W/O CONT   Final Result      ON-[id number]-8 VIEW   Final Result         Feeding tube with tip projecting over the expected area of the distal stomach.      DX-ABDOMEN FOR TUBE PLACEMENT    Final Result      Feeding tube tip projects over the distal stomach or duodenal bulb.      DX-ABDOMEN FOR TUBE PLACEMENT   Final Result      1.  Feeding tube tip projects near the GE junction.      MR-BRAIN-W/O   Final Result      1.  Moderate-sized acute right MCA territory infarct with hemorrhagic transformation.   2.  Moderate diffuse cervical substance loss.   3.  Advanced microangiopathic ischemic change versus demyelination or gliosis.   4.  Chronic ischemic pontine gliosis.   5.  Findings of pansinusitis.      These findings were discussed with MORIS CANTU on 8/9/2019 2:50 PM. These findings were discussed with Dr. Mahendra Mann for Dr. MORIS CANTU on 8/9/2019 2:56 PM.      DX-CHEST-PORTABLE (1 VIEW)   Final Result      1.  No acute cardiopulmonary disease.   2.  Contrast present within mildly dilated LEFT renal collecting system.      IR-THROMBO MECHANICAL ARTERY,INIT   Final Result      1.  Occlusion of the right middle cerebral artery, M2 segment.      2.  Successful cerebral thrombectomy performed with post intervention angiogram demonstrating patent right middle cerebral artery vessels.      Findings were discussed with Dr. Miramontes at approximately 1755 hrs.      OUTSIDE IMAGES-CT HEAD   Final Result      OUTSIDE IMAGES-CT HEAD   Final Result      CT-CTA NECK WITH & W/O-POST PROCESSING   Final Result      Moderate calcified plaque right carotid artery bifurcation. No significant stenosis.   Left carotid arteries appear patent.   Vertebral arteries appear patent.   Focal enlargement of right lingual tonsils measuring 12.6 mm in diameter. Patient's condition permits, would consider further evaluation.      CT-CEREBRAL PERFUSION ANALYSIS   Final Result      1.  Cerebral blood flow less than 30% likely representing completed infarct = 38 mL.      2.  T Max more than 6 seconds likely representing combination of completed infarct and ischemia = 98 mL.      3.  Mismatched volume likely representing ischemic  brain/penumbra = 60 mL      4.  Please note that the cerebral perfusion was performed on the limited brain tissue around the basal ganglia region. Infarct/ischemia outside the CT perfusion sections can be missed in this study.      CT-CTA HEAD WITH & W/O-POST PROCESS    (Results Pending)        Assessment/Plan  * CVA (cerebral vascular accident) (HCC)- (present on admission)  Assessment & Plan  -Status post thrombectomy  -MRI with hemorrhagic transformation   -dysphagia requiring Cortrack for tube feedings.   -expressive aphasia  -left-sided neglect and extremity flaccidity  -c/o headaches today  -Palliative Care recommended Hospice consult  -ASA and statin  -CM/SS assisting with placement/Hospice/Family          Diarrhea of presumed infectious origin  Assessment & Plan  -3 loose, foul smelling stools today  -DC'd bowel protocol  -she has received IV ABX for UTI  -R/O CDIFF    Dysphagia  Assessment & Plan  -due to stroke  -SLP following  -NPO with Cortrack with tube feedings  -Aspiration Precautions     Acute cystitis without hematuria- (present on admission)  Assessment & Plan  -E. Coli   -completed ABX 8/11/19  -afebrile    Tonsillar enlargement- (present on admission)  Assessment & Plan  -Incidental finding on CT  -Will need further work-up depending on clinical recovery    Anemia- (present on admission)  Assessment & Plan  -chronic and stable at her baseline      Acute encephalopathy- (present on admission)  Assessment & Plan  -likely multi-factorial - acute cystitis (s/p treatment), CVA, worsening baseline dementia, hospitalization  -arouses to voice today. Answers orientation questions correctly.   -no behavioral outbursts  -frequent reorientation, sleep hygiene  -Fall Precautions  -avoid benzos/narcotics      DNR (do not resuscitate)- (present on admission)  Assessment & Plan  -son would still want patient intubated. Ongoing education & counseling  -Hospice        CAD (coronary artery disease)- (present on  admission)  Assessment & Plan  -continue ASA and statin      Falls frequently- (present on admission)  Assessment & Plan  -now s/p CVA with left-sided flaccidity  -plan to DC on Hospice  -Fall Precautions      Dementia- (present on admission)  Assessment & Plan  -difficult to ascertain her baseline  -continue Aricept  -frequent reorientation  -Fall precautions  -Plan to DC to home or facility on hospice         VTE prophylaxis: SCD    ANTONIO Dodson.

## 2019-08-19 NOTE — CARE PLAN
Problem: Safety  Goal: Will remain free from injury  Note:   Bed locked and in lowest position. Call light and personal belongings in reach. Bed alarm in place. Hourly rounding.       Problem: Skin Integrity  Goal: Risk for impaired skin integrity will decrease  Note:   Waffle overlay mattress in use. Q2 turns. Heel float boot and foot drop boot being rotated Q2 hours. Pillows in use for support and positioning. Barrier cream in use.

## 2019-08-20 PROCEDURE — 700102 HCHG RX REV CODE 250 W/ 637 OVERRIDE(OP): Performed by: HOSPITALIST

## 2019-08-20 PROCEDURE — 92526 ORAL FUNCTION THERAPY: CPT

## 2019-08-20 PROCEDURE — A9270 NON-COVERED ITEM OR SERVICE: HCPCS | Performed by: INTERNAL MEDICINE

## 2019-08-20 PROCEDURE — 700102 HCHG RX REV CODE 250 W/ 637 OVERRIDE(OP): Performed by: INTERNAL MEDICINE

## 2019-08-20 PROCEDURE — A9270 NON-COVERED ITEM OR SERVICE: HCPCS | Performed by: NURSE PRACTITIONER

## 2019-08-20 PROCEDURE — 99232 SBSQ HOSP IP/OBS MODERATE 35: CPT | Performed by: INTERNAL MEDICINE

## 2019-08-20 PROCEDURE — A9270 NON-COVERED ITEM OR SERVICE: HCPCS | Performed by: HOSPITALIST

## 2019-08-20 PROCEDURE — 770021 HCHG ROOM/CARE - ISO PRIVATE

## 2019-08-20 PROCEDURE — 700102 HCHG RX REV CODE 250 W/ 637 OVERRIDE(OP): Performed by: NURSE PRACTITIONER

## 2019-08-20 RX ADMIN — ATORVASTATIN CALCIUM 40 MG: 40 TABLET, FILM COATED ORAL at 22:52

## 2019-08-20 RX ADMIN — DONEPEZIL HYDROCHLORIDE 10 MG: 5 TABLET, FILM COATED ORAL at 16:51

## 2019-08-20 RX ADMIN — ASPIRIN 81 MG 81 MG: 81 TABLET ORAL at 04:53

## 2019-08-20 RX ADMIN — ACETAMINOPHEN 500 MG: 500 TABLET ORAL at 04:53

## 2019-08-20 RX ADMIN — ACETAMINOPHEN 500 MG: 500 TABLET ORAL at 16:51

## 2019-08-20 RX ADMIN — ACETAMINOPHEN 500 MG: 500 TABLET ORAL at 14:02

## 2019-08-20 RX ADMIN — ACETAMINOPHEN 500 MG: 500 TABLET ORAL at 22:52

## 2019-08-20 RX ADMIN — LEVOTHYROXINE SODIUM 25 MCG: 25 TABLET ORAL at 04:53

## 2019-08-20 NOTE — DISCHARGE PLANNING
Anticipated Discharge Disposition: TBD    Action: LSW tc PFA and spoke to Abigail. Abigail states that pt currently has Medicare and life insurance through [employer] and is not eligible for Medicaid. ENEIDA Omer with [facility] left a vm asking for a call back regarding Medicaid screening. LSW carson, Negra with [facility] and explained pt not eligible for Medicaid. Negra currently working on placement. Pt's son unable to care for pt at home and limited income to pay for GH.     Barriers to Discharge: None    Plan: LSW to reach out to [facility].

## 2019-08-20 NOTE — DISCHARGE PLANNING
Anticipated Discharge Disposition: TBD    Action: LSW received phone call from Rose with [facility]  ([phone]) and was informed that they had a shared room for a female available. Rose aware of monthly income ($[id number]) and stated they could work something out. LSW contacted pt's son, Mikael and he states he is interested. Mikael will be visiting pt tomorrow and would like to meet at 1PM. Rose aware and agreed to meet at 1PM.     LSW left  for Negra with [facility] asking for a call back to update.     Barriers to Discharge: None    Plan: f/u with . LSW to assist as needed.

## 2019-08-20 NOTE — PROGRESS NOTES
Assumed care at 0700 and received bedside report. Pt. Is A&OX2-3. C-diff sample was negative Special isolation was discontinued. Pt. Continues to have loose stools. NPO status maintained. No c/o pain or discomfort. Will continue to monitor.

## 2019-08-20 NOTE — CARE PLAN
Problem: Safety - Medical Restraint  Goal: Remains free of injury from restraints (Restraint for Interference with Medical Device)  Description  INTERVENTIONS:  1. Determine that other, less restrictive measures have been tried or would not be effective before applying the restraint  2. Evaluate the patient's condition at the time of restraint application  3. Inform patient/family regarding the reason for restraint  4. Q2H: Monitor safety, psychosocial status, comfort, nutrition and hydration  Outcome: PROGRESSING AS EXPECTED   Pt. Has not attempted to remove cortrack or any other medical equipment.   Problem: Bowel/Gastric:  Goal: Normal bowel function is maintained or improved  Outcome: PROGRESSING SLOWER THAN EXPECTED   Pt. Is having multiple loose stools.

## 2019-08-20 NOTE — PROGRESS NOTES
Isolation Precautions:    Patient is on Special contact isolation for C. Diff.    Patient educated on reason for isolation, how the infection may be transmitted, and how to help prevent transmission to others.     Patient educated that Special Contact precautions involves staff and visitors wearing PPE, follow  Standard Precautions and perform meticulous hand hygiene in order to prevent transmission of infection. (Contact Precautions: gown and gloves; Special Contact Precautions: gown and gloves, with the added requirement of soap and water for hand hygiene; Droplet Precautions: surgical mask worn by staff and visitors in the room, and worn by the patient when out of the room; Airborne Precautions: involves staff wearing PPE to include an N95 Respirator or Controlled Air Purifying Respirator (CAPR).  Visitors should be limited and may wear an N95 mask).         Patient transport and mobilization on unit. Patient educated that they may leave their room, but prior to exiting, the patient needs to have on a fresh patient gown, ensure the potentially infectious area is covered, perform appropriate hand hygiene immediately prior to exiting the room. (*For Airborne Precautions: Patient educated that time out of the room should be minimized and limited to transport to diagnostic procedures or other activities. Patient is to wear surgical mask when required to leave the patient room).

## 2019-08-20 NOTE — THERAPY
Speech Language Therapy dysphagia treatment completed.   Functional Status: Pt seen for dysphagia tx at bedside with PO trials of ice chips, NTL via 1/4 tsp, purees via 1/4 tsp. Pt was alert but lethargic and slow to respond. She was grossly oriented to all spheres (month accurate but not exact date). Vocal quality was hypophonic with slightly increased wet quality from prior tx session. Oral phase of swallow was prolonged for bolus transit with anterior retention of liquid bolus after first swallow, requiring cued 2nd swallow with significant extra time needed for pt to initiate bolus formation and A-P transit of oral residue pooled in anterior portion of oral cavity. Pharyngeal swallow response was delayed ~4s across intake. Occasional increase in wet vocal quality noted with PO trials, requiring cued throat clear. Pt perseverated on wiping her mouth and tongue multiple times after each swallow. Attempted to train swallowing exercises with patient. Patient completed 5 reps of high pitched laryngeal elevation exercises before fatiguing. Pt did not complete alternating lingual presses despite max cues. Pt then stated she was tired and wanted to sleep. Family not present during tx session today for education. RN reports pt's son was in briefly earlier today for ~10 mins and did not request to speak with SLP. SLP is available for education if family is present and requests during working hours.      Recommendations: NPO/TF vs. Eating despite risk, depending on POC. If patient does transition to hospice, would recommend removal of Cortrak and allowing patient to eat for pleasure (despite risk of aspiration) with strict swallow precautions (sitting upright, small bites/sips, slow rate of intake, swallow 2x/per bolus). Dysphagia 1/NTL diet would help minimize but not eliminate risk of aspiration.   Plan of Care: Will benefit from Speech Therapy 3 times per week  Post-Acute Therapy: Recommend inpatient transitional care  "services for continued speech therapy services.        See \"Rehab Therapy-Acute\" Patient Summary Report for complete documentation.     "

## 2019-08-21 PROCEDURE — 700102 HCHG RX REV CODE 250 W/ 637 OVERRIDE(OP): Performed by: HOSPITALIST

## 2019-08-21 PROCEDURE — 700102 HCHG RX REV CODE 250 W/ 637 OVERRIDE(OP): Performed by: NURSE PRACTITIONER

## 2019-08-21 PROCEDURE — A9270 NON-COVERED ITEM OR SERVICE: HCPCS | Performed by: HOSPITALIST

## 2019-08-21 PROCEDURE — 99232 SBSQ HOSP IP/OBS MODERATE 35: CPT | Performed by: INTERNAL MEDICINE

## 2019-08-21 PROCEDURE — A9270 NON-COVERED ITEM OR SERVICE: HCPCS | Performed by: NURSE PRACTITIONER

## 2019-08-21 PROCEDURE — A9270 NON-COVERED ITEM OR SERVICE: HCPCS | Performed by: INTERNAL MEDICINE

## 2019-08-21 PROCEDURE — 700102 HCHG RX REV CODE 250 W/ 637 OVERRIDE(OP): Performed by: INTERNAL MEDICINE

## 2019-08-21 PROCEDURE — 770021 HCHG ROOM/CARE - ISO PRIVATE

## 2019-08-21 RX ADMIN — ATORVASTATIN CALCIUM 40 MG: 40 TABLET, FILM COATED ORAL at 22:17

## 2019-08-21 RX ADMIN — ACETAMINOPHEN 500 MG: 500 TABLET ORAL at 17:06

## 2019-08-21 RX ADMIN — DONEPEZIL HYDROCHLORIDE 10 MG: 5 TABLET, FILM COATED ORAL at 17:06

## 2019-08-21 RX ADMIN — ACETAMINOPHEN 500 MG: 500 TABLET ORAL at 11:44

## 2019-08-21 RX ADMIN — ACETAMINOPHEN 500 MG: 500 TABLET ORAL at 05:08

## 2019-08-21 RX ADMIN — ACETAMINOPHEN 500 MG: 500 TABLET ORAL at 22:17

## 2019-08-21 RX ADMIN — LEVOTHYROXINE SODIUM 25 MCG: 25 TABLET ORAL at 05:08

## 2019-08-21 RX ADMIN — ASPIRIN 81 MG 81 MG: 81 TABLET ORAL at 05:08

## 2019-08-21 NOTE — CARE PLAN
Problem: Safety  Goal: Will remain free from injury  Outcome: PROGRESSING AS EXPECTED  Goal: Will remain free from falls  Outcome: PROGRESSING AS EXPECTED     Problem: Venous Thromboembolism (VTW)/Deep Vein Thrombosis (DVT) Prevention:  Goal: Patient will participate in Venous Thrombosis (VTE)/Deep Vein Thrombosis (DVT)Prevention Measures  Outcome: PROGRESSING AS EXPECTED

## 2019-08-21 NOTE — PROGRESS NOTES
[facility] Medicine Daily Progress Note    Date of Service  8/20/2019    Chief Complaint  Left-sided weakness and vision changes    Hospital Course   This is an 88-year-old female with PMH significant for dementia, CAD and frequent falls who presented 8/15/2019 with GLF and left hemineglect.  Initial head CT on admission showed no acute findings.  MRI showed moderate-sized acute right MCA infarct with hemorrhagic transformation and she underwent thrombectomy.      Interval Problem Update    8/20. C diff (-).  Patient overnight remained comfortable.  Currently pending hospice to accept patient.  Pending placement.  Family agree with hospice and interested.   to continue outpatient with DC plan.  Patient complains of general body achiness. Patient's pain is general 2-3/10, intermittent and does not radiate to other location, sharp and with some tingling. Can be controlled by pain meds.       Consultants/Specialty  -Pulmonary/Crticial Care - signed off  -Palliative Care  -Hospice  -Neurology     Code Status  DNAR - Intubation OK    Disposition  Either to a facility or home with Hospice.    Review of Systems  Review of Systems   Unable to perform ROS: Mental acuity (Limited )        Physical Exam  Temp:  [36.3 °C (97.3 °F)-36.5 °C (97.7 °F)] 36.3 °C (97.4 °F)  Pulse:  [60-90] 90  Resp:  [16-18] 16  BP: (114-136)/(72-82) 114/72  SpO2:  [91 %-93 %] 93 %    Physical Exam   Constitutional: Vital signs are normal. She appears lethargic. She is sleeping and cooperative. She is easily aroused. She has a sickly appearance. No distress.   HENT:   Head: Normocephalic and atraumatic.   Right Ear: Hearing normal.   Left Ear: Hearing normal.   Nose: Nose normal.   Mouth/Throat: Oropharynx is clear and moist. Mucous membranes are dry and not cyanotic. No oropharyngeal exudate.   Cortrack left nare.   Eyes: Conjunctivae are normal. No scleral icterus. Left eye exhibits abnormal extraocular motion.   Neck: Neck supple. No  JVD present. No thyromegaly present.   Cardiovascular: Normal rate, regular rhythm and intact distal pulses. Exam reveals no gallop and no friction rub.   Murmur heard.  No edema   Pulmonary/Chest: Effort normal. No respiratory distress. She has decreased breath sounds. She has no wheezes. She has no rales. She exhibits no tenderness.   Mild upper airway congestion.  Poor cough.    Abdominal: Soft. Normal appearance and bowel sounds are normal. She exhibits no distension and no mass. There is no tenderness. There is no rigidity and no guarding.   Incontinent diarrhea stools   Musculoskeletal: Normal range of motion. She exhibits no edema or tenderness.   Left-sided flaccidity   Lymphadenopathy:     She has no cervical adenopathy.   Neurological: She is easily aroused. She appears lethargic. She displays atrophy. A cranial nerve deficit is present. She exhibits abnormal muscle tone. She displays no seizure activity. Coordination abnormal. GCS eye subscore is 3. GCS verbal subscore is 2. GCS motor subscore is 5.   RUE 4/5  RLE 4/5  LUE 0/5  LLE 0/5       Skin: Skin is warm and dry. No rash noted. She is not diaphoretic.   Psychiatric: Her behavior is normal. Her speech is delayed. She exhibits abnormal remote memory.   Nursing note and vitals reviewed.      Fluids    Intake/Output Summary (Last 24 hours) at 8/20/2019 1754  Last data filed at 8/20/2019 1200  Gross per 24 hour   Intake 600 ml   Output --   Net 600 ml       Laboratory                        Imaging  [id number] VIEW   Final Result         1.  Nonspecific bowel gas pattern.   2.  Dobbhoff tube with tip terminating overlying the expected location of the first or second duodenal segment.      [id number] VIEW   Final Result         1.  Nonspecific bowel gas pattern.   2.  Dobbhoff tube tip terminates overlying the expected location of the gastric antrum.      CT-HEAD W/O   Final Result      1.  Acute right MCA infarcts with hemorrhagic transformation seen  in the right temporal lobe. No significant change from prior brain MRI given differences in technique.   2.  Atrophy and chronic microvascular ischemic type changes.      EC-ECHOCARDIOGRAM COMPLETE W/O CONT   Final Result      [id number] VIEW   Final Result         Feeding tube with tip projecting over the expected area of the distal stomach.      DX-ABDOMEN FOR TUBE PLACEMENT   Final Result      Feeding tube tip projects over the distal stomach or duodenal bulb.      DX-ABDOMEN FOR TUBE PLACEMENT   Final Result      1.  Feeding tube tip projects near the GE junction.      MR-BRAIN-W/O   Final Result      1.  Moderate-sized acute right MCA territory infarct with hemorrhagic transformation.   2.  Moderate diffuse cervical substance loss.   3.  Advanced microangiopathic ischemic change versus demyelination or gliosis.   4.  Chronic ischemic pontine gliosis.   5.  Findings of pansinusitis.      These findings were discussed with MORIS CANTU on 8/9/2019 2:50 PM. These findings were discussed with Dr. Mahendra Mann for Dr. MORIS CANTU on 8/9/2019 2:56 PM.      DX-CHEST-PORTABLE (1 VIEW)   Final Result      1.  No acute cardiopulmonary disease.   2.  Contrast present within mildly dilated LEFT renal collecting system.      IR-THROMBO MECHANICAL ARTERY,INIT   Final Result      1.  Occlusion of the right middle cerebral artery, M2 segment.      2.  Successful cerebral thrombectomy performed with post intervention angiogram demonstrating patent right middle cerebral artery vessels.      Findings were discussed with Dr. Miramontes at approximately 1755 hrs.      OUTSIDE IMAGES-CT HEAD   Final Result      OUTSIDE IMAGES-CT HEAD   Final Result      CT-CTA NECK WITH & W/O-POST PROCESSING   Final Result      Moderate calcified plaque right carotid artery bifurcation. No significant stenosis.   Left carotid arteries appear patent.   Vertebral arteries appear patent.   Focal enlargement of right lingual tonsils measuring 12.6 mm  in diameter. Patient's condition permits, would consider further evaluation.      CT-CEREBRAL PERFUSION ANALYSIS   Final Result      1.  Cerebral blood flow less than 30% likely representing completed infarct = 38 mL.      2.  T Max more than 6 seconds likely representing combination of completed infarct and ischemia = 98 mL.      3.  Mismatched volume likely representing ischemic brain/penumbra = 60 mL      4.  Please note that the cerebral perfusion was performed on the limited brain tissue around the basal ganglia region. Infarct/ischemia outside the CT perfusion sections can be missed in this study.      CT-CTA HEAD WITH & W/O-POST PROCESS    (Results Pending)        Assessment/Plan  * CVA (cerebral vascular accident) (HCC)- (present on admission)  Assessment & Plan  -Status post thrombectomy  -MRI with hemorrhagic transformation   -dysphagia requiring Cortrack for tube feedings.   -expressive aphasia  -left-sided neglect and extremity flaccidity  -c/o headaches today  -Palliative Care recommended Hospice consult  -ASA and statin  -CM/SS assisting with placement/Hospice/Family  Patient accepted by hospice service however family unable to take care of patient at home with hospice.  Pending group home placement and acceptance by .      Diarrhea of presumed infectious origin  Assessment & Plan  - currently stopped, probably because of recent use of antibiotics.  -C. difficile negative    Dysphagia  Assessment & Plan  -due to stroke  -SLP following  -NPO with Cortrack with tube feedings  -Aspiration Precautions     Acute cystitis without hematuria- (present on admission)  Assessment & Plan  -E. Coli   -completed ABX 8/11/19  -afebrile    Tonsillar enlargement- (present on admission)  Assessment & Plan  -Incidental finding on CT  -Will need further work-up depending on clinical recovery    Anemia- (present on admission)  Assessment & Plan  -chronic and stable at her baseline      Acute encephalopathy-  (present on admission)  Assessment & Plan  -likely multi-factorial - acute cystitis (s/p treatment), CVA, worsening baseline dementia, hospitalization  -arouses to voice today. Answers orientation questions correctly.   -no behavioral outbursts  -frequent reorientation, sleep hygiene  -Fall Precautions  -avoid benzos/narcotics  Patient accepted by hospice service however family unable to take care of patient at home with hospice.  Pending group home placement and acceptance by .  Interventions to be considered: Keep patient awake during the dayand avoid day time naps. Remove all unnecessary lines (central lines, peripheral IVs, feeding tubes, calvert catheters). Avoid polypharmacy, frequent re-orientation, maximize family time at bedside, use glasses and hearing aids if needed, treat pain, encourage ambulation, minimize benzos/anticholinergic agents.       DNR (do not resuscitate)- (present on admission)  Assessment & Plan  -son would still want patient intubated. Ongoing education & counseling  -Hospice        CAD (coronary artery disease)- (present on admission)  Assessment & Plan  -continue ASA and statin      Falls frequently- (present on admission)  Assessment & Plan  -now s/p CVA with left-sided flaccidity  -plan to DC on Hospice  -Fall Precautions      Dementia- (present on admission)  Assessment & Plan  -difficult to ascertain her baseline  -continue Aricept  -frequent reorientation  -Fall precautions  -Plan to DC to home or facility on hospice    Patient require safe discharge plan with hospice to go to a facility because patient's family cannot take care of patient at home with hospice.  Pending group home acceptance.    VTE prophylaxis: SCD    Glory Mendiola M.D.      Current Facility-Administered Medications:   •  [CANCELED] Special Contact Isolation, , , CONTINUOUS **AND** [COMPLETED] C Diff by PCR rflx Toxin, , , Once **AND** Pharmacy Consult Request, 1 Each, Other, PHARMACY TO DOSE, Kalyn CORONADO  Rad, A.P.R.N.  •  acetaminophen (TYLENOL) tablet 500 mg, 500 mg, Enteral Tube, Q6HRS, Kalyn Leroy A.P.R.N., 500 mg at 08/20/19 1651  •  ipratropium-albuterol (DUONEB) nebulizer solution, 3 mL, Nebulization, Q4H PRN (RT), Stacy Roberts M.D.  •  Respiratory Care per Protocol, , Nebulization, Continuous RT, Stacy Roberts M.D.  •  albuterol inhaler 2 Puff, 2 Puff, Inhalation, Q4HRS PRN, Syl Fuentes M.D., 2 Puff at 08/12/19 0430  •  aspirin (ASA) chewable tab 81 mg, 81 mg, Enteral Tube, DAILY, Syl Fuentes M.D., 81 mg at 08/20/19 0453  •  Pharmacy Consult: Enteral tube insertion - review meds/change route/product selection, 1 Each, Other, PHARMACY TO DOSE, Messi Mann M.D.  •  ondansetron (ZOFRAN ODT) dispertab 4 mg, 4 mg, Enteral Tube, Q4HRS PRN, Messi Mann M.D.  •  atorvastatin (LIPITOR) tablet 40 mg, 40 mg, Enteral Tube, Nightly, Messi Mann M.D., 40 mg at 08/19/19 2319  •  levothyroxine (SYNTHROID) tablet 25 mcg, 25 mcg, Enteral Tube, AM ES, Messi Mann M.D., 25 mcg at 08/20/19 0453  •  donepezil (ARICEPT) tablet 10 mg, 10 mg, Enteral Tube, Q EVENING, Messi Mann M.D., 10 mg at 08/20/19 1651  •  labetalol (NORMODYNE,TRANDATE) injection 10 mg, 10 mg, Intravenous, Q4HRS PRN, Carlos Gay M.D.  •  enalaprilat (VASOTEC) injection 1.25 mg, 1.25 mg, Intravenous, Q6HRS PRN, Carlos K Deidra, M.D.  •  ondansetron (ZOFRAN) syringe/vial injection 4 mg, 4 mg, Intravenous, Q4HRS PRN, Glory Mendiola M.D.

## 2019-08-21 NOTE — PROGRESS NOTES
Assumed care at approximately 0700 and received bedside report. Pt. Is A&OX 4. Purewick placed this AM due to incontinence and dermatitis of groin. NPO status maintained. Tube feed as ordered and tolerating well. Will continue to monitor.

## 2019-08-21 NOTE — PALLIATIVE CARE
"Palliative Care follow-up  Stopped by the pts room, no family at the bedside. Called and spoke with pts son, Mikael. Discussed with Mikael current code status of DNR/I ok. Mikael stated that he would want respiratory support, including intubation if needed in order for him to be \"able to make it to the hospital\". Verified plan of DC to  with Hospice. Mikael acknowledged that once his mother was at the  he would want for her to be DNR/DNI CC, but wants to have the intubation ok stand until she leaves for her move back to [facility].   Updated: MD, Unit CM RN    Plan: DC to  with [facility] support.     Thank you for allowing Palliative Care to participate in this patient's care. Please feel free to call [phone] with any questions or concerns.  "

## 2019-08-21 NOTE — PROGRESS NOTES
2 RN skin check with OMID Ricketts.    Anjelica area is red due to incontinence  Scattered bruising on BLE  Skin tear on L hand    Q2 turns, barrier cream, heel float boots in place.

## 2019-08-21 NOTE — PROGRESS NOTES
Hospital Medicine Daily Progress Note    Date of Service  8/21/2019    Chief Complaint  Left-sided weakness and vision changes    Hospital Course   This is an 88-year-old female with PMH significant for dementia, CAD and frequent falls who presented 8/15/2019 with GLF and left hemineglect.  Initial head CT on admission showed no acute findings.  MRI showed moderate-sized acute right MCA infarct with hemorrhagic transformation and she underwent thrombectomy.      Interval Problem Update    8/20. C diff (-).  Patient overnight remained comfortable.  Currently pending hospice to accept patient.  Pending placement.  Family agree with hospice and interested.   to continue outpatient with DC plan.  Patient complains of general body achiness. Patient's pain is general 2-3/10, intermittent and does not radiate to other location, sharp and with some tingling. Can be controlled by pain meds.   8/21.  Patient is relatively comfortable today.  Still awaiting for discharge plan for outpatient hospice.  Patient's family is still actively seeking placement for hospice. Patient otherwise denies fever, chills, nausea, vomiting, adb pain, SOB, CP, headache, constipation, diarrhea, cough, or sputum.      Consultants/Specialty  -Pulmonary/Crticial Care - signed off  -Palliative Care  -Hospice  -Neurology     Code Status  DNAR - Intubation OK    Disposition  Either to a facility or home with Hospice.    Review of Systems  Review of Systems   Unable to perform ROS: Mental acuity (Limited )        Physical Exam  Temp:  [36.1 °C (97 °F)-36.6 °C (97.8 °F)] 36.4 °C (97.6 °F)  Pulse:  [87-99] 99  Resp:  [16-18] 16  BP: (119-137)/() 119/105  SpO2:  [92 %-93 %] 93 %    Physical Exam   Constitutional: Vital signs are normal. She appears lethargic. She is sleeping and cooperative. She is easily aroused. She has a sickly appearance. No distress.   HENT:   Head: Normocephalic and atraumatic.   Right Ear: Hearing normal.   Left  Ear: Hearing normal.   Nose: Nose normal.   Mouth/Throat: Oropharynx is clear and moist. Mucous membranes are dry and not cyanotic. No oropharyngeal exudate.   Cortrack left nare.   Eyes: Conjunctivae are normal. Right eye exhibits no discharge. Left eye exhibits no discharge. Left eye exhibits abnormal extraocular motion.   Neck: Neck supple. No tracheal deviation present. No thyromegaly present.   Cardiovascular: Normal rate, regular rhythm and intact distal pulses. Exam reveals no gallop and no friction rub.   Murmur heard.  No edema   Pulmonary/Chest: Effort normal. No respiratory distress. She has decreased breath sounds. She has no rales. She exhibits no tenderness.   Mild upper airway congestion.  Poor cough.    Abdominal: Soft. Normal appearance and bowel sounds are normal. She exhibits no distension and no mass. There is no rigidity and no rebound.   Incontinent diarrhea stools   Musculoskeletal: Normal range of motion. She exhibits no edema or tenderness.   Left-sided flaccidity   Lymphadenopathy:     She has no cervical adenopathy.   Neurological: She is easily aroused. She appears lethargic. She displays atrophy. A cranial nerve deficit is present. She exhibits abnormal muscle tone. She displays no seizure activity. Coordination abnormal. GCS eye subscore is 3. GCS verbal subscore is 2. GCS motor subscore is 5.   RUE 4/5  RLE 4/5  LUE 0/5  LLE 0/5       Skin: Skin is warm and dry. No rash noted. She is not diaphoretic.   Psychiatric: Her behavior is normal. Her speech is delayed. She exhibits abnormal remote memory.   Nursing note and vitals reviewed.      Fluids    Intake/Output Summary (Last 24 hours) at 8/21/2019 1602  Last data filed at 8/21/2019 1200  Gross per 24 hour   Intake 400 ml   Output --   Net 400 ml       Laboratory                        Imaging  [id number] VIEW   Final Result         1.  Nonspecific bowel gas pattern.   2.  Dobbhoff tube with tip terminating overlying the expected  location of the first or second duodenal segment.      [id number] VIEW   Final Result         1.  Nonspecific bowel gas pattern.   2.  Dobbhoff tube tip terminates overlying the expected location of the gastric antrum.      CT-HEAD W/O   Final Result      1.  Acute right MCA infarcts with hemorrhagic transformation seen in the right temporal lobe. No significant change from prior brain MRI given differences in technique.   2.  Atrophy and chronic microvascular ischemic type changes.      EC-ECHOCARDIOGRAM COMPLETE W/O CONT   Final Result      [id number] VIEW   Final Result         Feeding tube with tip projecting over the expected area of the distal stomach.      DX-ABDOMEN FOR TUBE PLACEMENT   Final Result      Feeding tube tip projects over the distal stomach or duodenal bulb.      DX-ABDOMEN FOR TUBE PLACEMENT   Final Result      1.  Feeding tube tip projects near the GE junction.      MR-BRAIN-W/O   Final Result      1.  Moderate-sized acute right MCA territory infarct with hemorrhagic transformation.   2.  Moderate diffuse cervical substance loss.   3.  Advanced microangiopathic ischemic change versus demyelination or gliosis.   4.  Chronic ischemic pontine gliosis.   5.  Findings of pansinusitis.      These findings were discussed with MORIS CANTU on 8/9/2019 2:50 PM. These findings were discussed with Dr. Mahendra Mann for Dr. MORIS CANTU on 8/9/2019 2:56 PM.      DX-CHEST-PORTABLE (1 VIEW)   Final Result      1.  No acute cardiopulmonary disease.   2.  Contrast present within mildly dilated LEFT renal collecting system.      IR-THROMBO MECHANICAL ARTERY,INIT   Final Result      1.  Occlusion of the right middle cerebral artery, M2 segment.      2.  Successful cerebral thrombectomy performed with post intervention angiogram demonstrating patent right middle cerebral artery vessels.      Findings were discussed with Dr. Miramontes at approximately 1755 hrs.      OUTSIDE IMAGES-CT HEAD   Final Result       OUTSIDE IMAGES-CT HEAD   Final Result      CT-CTA NECK WITH & W/O-POST PROCESSING   Final Result      Moderate calcified plaque right carotid artery bifurcation. No significant stenosis.   Left carotid arteries appear patent.   Vertebral arteries appear patent.   Focal enlargement of right lingual tonsils measuring 12.6 mm in diameter. Patient's condition permits, would consider further evaluation.      CT-CEREBRAL PERFUSION ANALYSIS   Final Result      1.  Cerebral blood flow less than 30% likely representing completed infarct = 38 mL.      2.  T Max more than 6 seconds likely representing combination of completed infarct and ischemia = 98 mL.      3.  Mismatched volume likely representing ischemic brain/penumbra = 60 mL      4.  Please note that the cerebral perfusion was performed on the limited brain tissue around the basal ganglia region. Infarct/ischemia outside the CT perfusion sections can be missed in this study.      CT-CTA HEAD WITH & W/O-POST PROCESS    (Results Pending)        Assessment/Plan  * CVA (cerebral vascular accident) (HCC)- (present on admission)  Assessment & Plan  -Status post thrombectomy  -MRI with hemorrhagic transformation   -dysphagia requiring Cortrack for tube feedings.   -expressive aphasia  -left-sided neglect and extremity flaccidity  -c/o headaches today  -Palliative Care recommended Hospice consult  -ASA and statin  -CM/SS assisting with placement/Hospice/Family  Patient accepted by hospice service however family unable to take care of patient at home with hospice.  Pending group home placement and acceptance by .    Family member continue to work with  to develop safe discharge hospice plan.  Pending placement    Diarrhea of presumed infectious origin  Assessment & Plan  - currently stopped, probably because of recent use of antibiotics.  -C. difficile negative    Dysphagia  Assessment & Plan  -due to stroke  -SLP following  -NPO with Cortrack with  tube feedings  -Aspiration Precautions     Acute cystitis without hematuria- (present on admission)  Assessment & Plan  -E. Coli   -completed ABX 8/11/19  -afebrile  Continue monitor    Tonsillar enlargement- (present on admission)  Assessment & Plan  -Incidental finding on CT  -Will need further work-up depending on clinical recovery    Anemia- (present on admission)  Assessment & Plan  -chronic and stable at her baseline      Acute encephalopathy- (present on admission)  Assessment & Plan  -likely multi-factorial - acute cystitis (s/p treatment), CVA, worsening baseline dementia, hospitalization  -arouses to voice today. Answers orientation questions correctly.   -no behavioral outbursts  -frequent reorientation, sleep hygiene  -Fall Precautions  -avoid benzos/narcotics  Patient accepted by hospice service however family unable to take care of patient at home with hospice.  Pending group home placement and acceptance by .  Interventions to be considered: Keep patient awake during the dayand avoid day time naps. Remove all unnecessary lines (central lines, peripheral IVs, feeding tubes, calvert catheters). Avoid polypharmacy, frequent re-orientation, maximize family time at bedside, use glasses and hearing aids if needed, treat pain, encourage ambulation, minimize benzos/anticholinergic agents.       DNR (do not resuscitate)- (present on admission)  Assessment & Plan  -son would still want patient intubated. Ongoing education & counseling  -Hospice  Pending outpatient hospice to set up      CAD (coronary artery disease)- (present on admission)  Assessment & Plan  -continue ASA and statin      Falls frequently- (present on admission)  Assessment & Plan  -now s/p CVA with left-sided flaccidity  -plan to DC on Hospice  -Fall Precautions      Dementia- (present on admission)  Assessment & Plan  -difficult to ascertain her baseline  -continue Aricept  -frequent reorientation  -Fall precautions  -Plan to DC to  home or facility on hospice    Patient require safe discharge plan with hospice to go to a facility because patient's family cannot take care of patient at home with hospice.  Pending group home acceptance.    VTE prophylaxis: SCD    Glory Mendiola M.D.      Current Facility-Administered Medications:   •  [CANCELED] Special Contact Isolation, , , CONTINUOUS **AND** [COMPLETED] C Diff by PCR rflx Toxin, , , Once **AND** Pharmacy Consult Request, 1 Each, Other, PHARMACY TO DOSE, Kalyn Leroy, A.P.R.NKatelynn  •  acetaminophen (TYLENOL) tablet 500 mg, 500 mg, Enteral Tube, Q6HRS, Kalyn Leroy A.P.R.N., 500 mg at 08/21/19 1144  •  ipratropium-albuterol (DUONEB) nebulizer solution, 3 mL, Nebulization, Q4H PRN (RT), Stacy Roberts M.D.  •  Respiratory Care per Protocol, , Nebulization, Continuous RT, Stacy Roberts M.D.  •  albuterol inhaler 2 Puff, 2 Puff, Inhalation, Q4HRS PRN, Syl Fuentes M.D., 2 Puff at 08/12/19 0430  •  aspirin (ASA) chewable tab 81 mg, 81 mg, Enteral Tube, DAILY, Syl Fuentes M.D., 81 mg at 08/21/19 0508  •  Pharmacy Consult: Enteral tube insertion - review meds/change route/product selection, 1 Each, Other, PHARMACY TO DOSE, Messi Mann M.D.  •  ondansetron (ZOFRAN ODT) dispertab 4 mg, 4 mg, Enteral Tube, Q4HRS PRN, Messi Mann M.D.  •  atorvastatin (LIPITOR) tablet 40 mg, 40 mg, Enteral Tube, Nightly, Messi Mann M.D., 40 mg at 08/20/19 2252  •  levothyroxine (SYNTHROID) tablet 25 mcg, 25 mcg, Enteral Tube, AM ES, Messi Mann M.D., 25 mcg at 08/21/19 0508  •  donepezil (ARICEPT) tablet 10 mg, 10 mg, Enteral Tube, Q EVENING, Messi Mann M.D., 10 mg at 08/20/19 1651  •  labetalol (NORMODYNE,TRANDATE) injection 10 mg, 10 mg, Intravenous, Q4HRS PRN, Carlos Gay M.D.  •  enalaprilat (VASOTEC) injection 1.25 mg, 1.25 mg, Intravenous, Q6HRS PRN, Carlos Gay M.D.  •  ondansetron (ZOFRAN) syringe/vial injection 4 mg, 4 mg, Intravenous, Q4HRS PRN,  Glory Mendiola M.D.

## 2019-08-21 NOTE — CARE PLAN
Problem: Communication  Goal: The ability to communicate needs accurately and effectively will improve  Outcome: PROGRESSING AS EXPECTED     Problem: Safety  Goal: Will remain free from injury  Outcome: PROGRESSING AS EXPECTED      HA x 1 week

## 2019-08-22 PROCEDURE — 700102 HCHG RX REV CODE 250 W/ 637 OVERRIDE(OP): Performed by: HOSPITALIST

## 2019-08-22 PROCEDURE — 770021 HCHG ROOM/CARE - ISO PRIVATE

## 2019-08-22 PROCEDURE — 700102 HCHG RX REV CODE 250 W/ 637 OVERRIDE(OP): Performed by: INTERNAL MEDICINE

## 2019-08-22 PROCEDURE — A9270 NON-COVERED ITEM OR SERVICE: HCPCS | Performed by: NURSE PRACTITIONER

## 2019-08-22 PROCEDURE — A9270 NON-COVERED ITEM OR SERVICE: HCPCS | Performed by: HOSPITALIST

## 2019-08-22 PROCEDURE — 700102 HCHG RX REV CODE 250 W/ 637 OVERRIDE(OP): Performed by: NURSE PRACTITIONER

## 2019-08-22 PROCEDURE — 86480 TB TEST CELL IMMUN MEASURE: CPT

## 2019-08-22 PROCEDURE — 36415 COLL VENOUS BLD VENIPUNCTURE: CPT

## 2019-08-22 PROCEDURE — A9270 NON-COVERED ITEM OR SERVICE: HCPCS | Performed by: INTERNAL MEDICINE

## 2019-08-22 PROCEDURE — 99232 SBSQ HOSP IP/OBS MODERATE 35: CPT | Performed by: INTERNAL MEDICINE

## 2019-08-22 RX ADMIN — ASPIRIN 81 MG 81 MG: 81 TABLET ORAL at 04:27

## 2019-08-22 RX ADMIN — ATORVASTATIN CALCIUM 40 MG: 40 TABLET, FILM COATED ORAL at 22:19

## 2019-08-22 RX ADMIN — ACETAMINOPHEN 500 MG: 500 TABLET ORAL at 16:36

## 2019-08-22 RX ADMIN — DONEPEZIL HYDROCHLORIDE 10 MG: 5 TABLET, FILM COATED ORAL at 16:36

## 2019-08-22 RX ADMIN — ACETAMINOPHEN 500 MG: 500 TABLET ORAL at 22:19

## 2019-08-22 RX ADMIN — LEVOTHYROXINE SODIUM 25 MCG: 25 TABLET ORAL at 04:27

## 2019-08-22 RX ADMIN — ACETAMINOPHEN 500 MG: 500 TABLET ORAL at 04:27

## 2019-08-22 RX ADMIN — ACETAMINOPHEN 500 MG: 500 TABLET ORAL at 11:10

## 2019-08-22 NOTE — PROGRESS NOTES
2 RN skin check    -Anjelica and rectal area red due to incontinence, barrier cream on  -Scattered bruising BLE  -Skin rear on left hand, covered with clear dressing.    Q2 turns in place, waffle cushion on, barrier cream in use.

## 2019-08-22 NOTE — DOCUMENTATION QUERY
On license of [facility]                                                                       Query Response Note      PATIENT:               CLAUDIO ESPINOZA  ACCT #:                  [id number]  MRN:                     [id number]  :                      [id number]  ADMIT DATE:       2019 2:38 PM  DISCH DATE:          RESPONDING  PROVIDER #:        [id number]           QUERY TEXT:    Anemia due to unspecified blood loss is documented in the medical record.  Please specify the acuity of the blood loss.     NOTE:  If an appropriate response is not listed below, please respond with a new note.    The patient's Clinical Indicators include:  Hg 13.8, 13.7, 8.5, 9.7    Per Progress:   Anemia, blood loss    Treatment:   Monitor labs & repeat CT scan      Risk Factors:   CVA with hemorrhage conversion  Options provided:   -- Acute blood loss anemia   -- Chronic blood loss   -- Unable to determine      Query created by: Niurka Silva on 2019 10:15 AM    RESPONSE TEXT:    Acute blood loss anemia          Electronically signed by:  ALTAF CORBETT 2019 10:21 AM

## 2019-08-22 NOTE — PROGRESS NOTES
2 RN skin check with OMID Ricketts.    Anjelica area is red and excoriated due to incontinence. Barrier cream applied, anjelica pads changed frequently. Purwick in place to decrease exposure to moisture.  Scattered bruising on BLE. Small, healing skin tear on top of L foot, foam dressing CDI.  Skin tear on L hand. Transparent dressing in place. No drainage noted.    Q2 turns, barrier cream, and pillows in place.

## 2019-08-22 NOTE — CARE PLAN
Problem: Safety  Goal: Will remain free from injury  8/21/2019 2038 by Tara Kwoalski R.N.  Outcome: PROGRESSING AS EXPECTED  8/21/2019 0752 by Tara Kowalski R.N.  Outcome: PROGRESSING AS EXPECTED  Goal: Will remain free from falls  8/21/2019 2038 by Tara Kowalski R.N.  Outcome: PROGRESSING AS EXPECTED  8/21/2019 0752 by Tara Kowalski R.N.  Outcome: PROGRESSING AS EXPECTED     Problem: Venous Thromboembolism (VTW)/Deep Vein Thrombosis (DVT) Prevention:  Goal: Patient will participate in Venous Thrombosis (VTE)/Deep Vein Thrombosis (DVT)Prevention Measures  Outcome: PROGRESSING AS EXPECTED     Problem: Communication  Goal: The ability to communicate needs accurately and effectively will improve  Outcome: PROGRESSING SLOWER THAN EXPECTED     Problem: Bowel/Gastric:  Goal: Will not experience complications related to bowel motility  Outcome: PROGRESSING SLOWER THAN EXPECTED

## 2019-08-23 LAB
GAMMA INTERFERON BACKGROUND BLD IA-ACNC: 0.03 IU/ML
M TB IFN-G BLD-IMP: NEGATIVE
M TB IFN-G CD4+ BCKGRND COR BLD-ACNC: 0 IU/ML
MITOGEN IGNF BCKGRD COR BLD-ACNC: >10 IU/ML
QFT TB2 - NIL TBQ2: 0 IU/ML

## 2019-08-23 PROCEDURE — 770021 HCHG ROOM/CARE - ISO PRIVATE

## 2019-08-23 PROCEDURE — A9270 NON-COVERED ITEM OR SERVICE: HCPCS | Performed by: NURSE PRACTITIONER

## 2019-08-23 PROCEDURE — A9270 NON-COVERED ITEM OR SERVICE: HCPCS | Performed by: HOSPITALIST

## 2019-08-23 PROCEDURE — 700102 HCHG RX REV CODE 250 W/ 637 OVERRIDE(OP): Performed by: NURSE PRACTITIONER

## 2019-08-23 PROCEDURE — A9270 NON-COVERED ITEM OR SERVICE: HCPCS | Performed by: INTERNAL MEDICINE

## 2019-08-23 PROCEDURE — 700102 HCHG RX REV CODE 250 W/ 637 OVERRIDE(OP): Performed by: HOSPITALIST

## 2019-08-23 PROCEDURE — 99232 SBSQ HOSP IP/OBS MODERATE 35: CPT | Performed by: INTERNAL MEDICINE

## 2019-08-23 PROCEDURE — 700102 HCHG RX REV CODE 250 W/ 637 OVERRIDE(OP): Performed by: INTERNAL MEDICINE

## 2019-08-23 RX ADMIN — ASPIRIN 81 MG 81 MG: 81 TABLET ORAL at 04:22

## 2019-08-23 RX ADMIN — ATORVASTATIN CALCIUM 40 MG: 40 TABLET, FILM COATED ORAL at 17:11

## 2019-08-23 RX ADMIN — LEVOTHYROXINE SODIUM 25 MCG: 25 TABLET ORAL at 04:22

## 2019-08-23 RX ADMIN — ACETAMINOPHEN 500 MG: 500 TABLET ORAL at 04:22

## 2019-08-23 RX ADMIN — ACETAMINOPHEN 500 MG: 500 TABLET ORAL at 17:11

## 2019-08-23 RX ADMIN — ACETAMINOPHEN 500 MG: 500 TABLET ORAL at 12:31

## 2019-08-23 RX ADMIN — DONEPEZIL HYDROCHLORIDE 10 MG: 5 TABLET, FILM COATED ORAL at 17:11

## 2019-08-23 NOTE — PROGRESS NOTES
2 RN skin check complete with OMID Cox: Skin tear noted to Left foot, left, knee, and left hand. Excoriation and breakdown noted to bilateral groins, and butt- small open areas noted to left butt and left upper thigh appears to be from moisture. Purewick in place, applied barrier cream.

## 2019-08-23 NOTE — CARE PLAN
Problem: Safety  Goal: Will remain free from injury  Outcome: PROGRESSING AS EXPECTED  Goal: Will remain free from falls  Outcome: PROGRESSING AS EXPECTED     Problem: Discharge Barriers/Planning  Goal: Patient's continuum of care needs will be met  Outcome: PROGRESSING AS EXPECTED

## 2019-08-23 NOTE — PROGRESS NOTES
2 RN skin check with OMID Bah.    Anjelica area is red and excoriated d/t incontinence. Improving with frequent anjelica pad changes, Purwick use, and frequent barrier cream application.  Scattered bruising on BLE. Small, healing skin tear on top of L foot (dressing CDI).  Skin tear on L hand, transparent dressing CDI, no drainage noted.  Heels/elbows are pink and blanching.    Q2 turns, barrier cream, Purwick, pillows in place to prevent further skin breakdown.

## 2019-08-23 NOTE — DISCHARGE PLANNING
Anticipated Discharge Disposition: Home with Hospice    Action: LSW [place]on son, Mikael for update on GH. Mikael stated that pt has decided to not move forward with [facility]. Mikael will be taking pt to his home on hospice.   RICARDO Omer with [facility] and informed LSW that aiming to have everything ready to accommodate pt by Monday. Mikael needs to clean out a room to make it available for pt.     Barriers to Discharge: None    Plan: LSW to assist as needed.

## 2019-08-23 NOTE — PROGRESS NOTES
[facility] Medicine Daily Progress Note    Date of Service  8/23/2019    Chief Complaint  Left-sided weakness and vision changes    Hospital Course   This is an 88-year-old female with PMH significant for dementia, CAD and frequent falls who presented 8/15/2019 with GLF and left hemineglect.  Initial head CT on admission showed no acute findings.  MRI showed moderate-sized acute right MCA infarct with hemorrhagic transformation and she underwent thrombectomy.      Interval Problem Update    8/20. C diff (-).  Patient overnight remained comfortable.  Currently pending hospice to accept patient.  Pending placement.  Family agree with hospice and interested.   to continue outpatient with DC plan.  Patient complains of general body achiness. Patient's pain is general 2-3/10, intermittent and does not radiate to other location, sharp and with some tingling. Can be controlled by pain meds.   8/21.  Patient is relatively comfortable today.  Still awaiting for discharge plan for outpatient hospice.  Patient's family is still actively seeking placement for hospice. Patient otherwise denies fever, chills, nausea, vomiting, adb pain, SOB, CP, headache, constipation, diarrhea, cough, or sputum.  8/22.  Patient is still very lethargic.  Awaiting for placement for hospice.  Continue work with  and family for safe discharge plan.  Patient complains of general body achiness. Patient's pain is general 1-2/10, intermittent and does not radiate to other location, sharp and with some tingling. Can be controlled by pain meds.   8/23.  Patient remained stable still pending hospice to set up. Patient otherwise denies fever, chills, nausea, vomiting, adb pain, SOB, CP, headache, constipation, diarrhea, cough, or sputum.    Consultants/Specialty  -Pulmonary/Crticial Care - signed off  -Palliative Care  -Hospice  -Neurology     Code Status  DNAR - Intubation OK    Disposition  Either to a facility or home with  Hospice.    Review of Systems  Review of Systems   Unable to perform ROS: Mental acuity (Limited )        Physical Exam  Temp:  [36 °C (96.8 °F)-36.1 °C (97 °F)] 36 °C (96.8 °F)  Pulse:  [] 100  Resp:  [18] 18  BP: (128-153)/() 153/113  SpO2:  [95 %-99 %] 96 %    Physical Exam   Constitutional: Vital signs are normal. She appears lethargic. She is sleeping and cooperative. She is easily aroused. She has a sickly appearance. No distress.   HENT:   Right Ear: Hearing and external ear normal.   Left Ear: Hearing and external ear normal.   Nose: Nose normal.   Mouth/Throat: Oropharynx is clear and moist. Mucous membranes are dry and not cyanotic. No oropharyngeal exudate.   Cortrack left nare.   Eyes: Conjunctivae are normal. Left eye exhibits no discharge. Left eye exhibits abnormal extraocular motion.   Neck: Neck supple. No tracheal deviation present. No thyromegaly present.   Cardiovascular: Normal rate, regular rhythm and intact distal pulses. Exam reveals no gallop.   Murmur heard.  No edema   Pulmonary/Chest: Effort normal. No respiratory distress. She has decreased breath sounds. She exhibits no tenderness.   Mild upper airway congestion.  Poor cough.    Abdominal: Soft. Normal appearance and bowel sounds are normal. She exhibits no distension. There is no rigidity, no rebound and no guarding.   Incontinent diarrhea stools   Musculoskeletal: Normal range of motion. She exhibits no tenderness.   Left-sided flaccidity   Neurological: She is easily aroused. She appears lethargic. She displays atrophy. A cranial nerve deficit is present. She exhibits abnormal muscle tone. She displays no seizure activity. Coordination abnormal. GCS eye subscore is 3. GCS verbal subscore is 2. GCS motor subscore is 5.   RUE 4/5  RLE 4/5  LUE 0/5  LLE 0/5       Skin: Skin is warm and dry. No rash noted. She is not diaphoretic.   Psychiatric: Her behavior is normal. Her speech is delayed. She exhibits abnormal remote  memory.   Nursing note and vitals reviewed.      Fluids    Intake/Output Summary (Last 24 hours) at 8/23/2019 1652  Last data filed at 8/23/2019 0730  Gross per 24 hour   Intake 600 ml   Output 450 ml   Net 150 ml       Laboratory                        Imaging  [id number] VIEW   Final Result         1.  Nonspecific bowel gas pattern.   2.  Dobbhoff tube with tip terminating overlying the expected location of the first or second duodenal segment.      [id number] VIEW   Final Result         1.  Nonspecific bowel gas pattern.   2.  Dobbhoff tube tip terminates overlying the expected location of the gastric antrum.      CT-HEAD W/O   Final Result      1.  Acute right MCA infarcts with hemorrhagic transformation seen in the right temporal lobe. No significant change from prior brain MRI given differences in technique.   2.  Atrophy and chronic microvascular ischemic type changes.      EC-ECHOCARDIOGRAM COMPLETE W/O CONT   Final Result      [id number] VIEW   Final Result         Feeding tube with tip projecting over the expected area of the distal stomach.      DX-ABDOMEN FOR TUBE PLACEMENT   Final Result      Feeding tube tip projects over the distal stomach or duodenal bulb.      DX-ABDOMEN FOR TUBE PLACEMENT   Final Result      1.  Feeding tube tip projects near the GE junction.      MR-BRAIN-W/O   Final Result      1.  Moderate-sized acute right MCA territory infarct with hemorrhagic transformation.   2.  Moderate diffuse cervical substance loss.   3.  Advanced microangiopathic ischemic change versus demyelination or gliosis.   4.  Chronic ischemic pontine gliosis.   5.  Findings of pansinusitis.      These findings were discussed with MORIS CANTU on 8/9/2019 2:50 PM. These findings were discussed with Dr. Mahendra Mann for Dr. MORIS CANTU on 8/9/2019 2:56 PM.      DX-CHEST-PORTABLE (1 VIEW)   Final Result      1.  No acute cardiopulmonary disease.   2.  Contrast present within mildly dilated LEFT renal  collecting system.      IR-THROMBO MECHANICAL ARTERY,INIT   Final Result      1.  Occlusion of the right middle cerebral artery, M2 segment.      2.  Successful cerebral thrombectomy performed with post intervention angiogram demonstrating patent right middle cerebral artery vessels.      Findings were discussed with Dr. Miramontse at approximately 1755 hrs.      OUTSIDE IMAGES-CT HEAD   Final Result      OUTSIDE IMAGES-CT HEAD   Final Result      CT-CTA NECK WITH & W/O-POST PROCESSING   Final Result      Moderate calcified plaque right carotid artery bifurcation. No significant stenosis.   Left carotid arteries appear patent.   Vertebral arteries appear patent.   Focal enlargement of right lingual tonsils measuring 12.6 mm in diameter. Patient's condition permits, would consider further evaluation.      CT-CEREBRAL PERFUSION ANALYSIS   Final Result      1.  Cerebral blood flow less than 30% likely representing completed infarct = 38 mL.      2.  T Max more than 6 seconds likely representing combination of completed infarct and ischemia = 98 mL.      3.  Mismatched volume likely representing ischemic brain/penumbra = 60 mL      4.  Please note that the cerebral perfusion was performed on the limited brain tissue around the basal ganglia region. Infarct/ischemia outside the CT perfusion sections can be missed in this study.      CT-CTA HEAD WITH & W/O-POST PROCESS    (Results Pending)        Assessment/Plan    * CVA (cerebral vascular accident) (HCC)- (present on admission)  Assessment & Plan  -Status post thrombectomy  -MRI with hemorrhagic transformation   -dysphagia requiring Cortrack for tube feedings.   -expressive aphasia  -left-sided neglect and extremity flaccidity  -c/o headaches today  -Palliative Care recommended Hospice consult  -ASA and statin  -CM/SS assisting with placement/Hospice/Family  Patient family agree with hospice          Diarrhea of presumed infectious origin  Assessment & Plan  resolved  -DC'd  bowel protocol  -she has received IV ABX for UTI  -R/O CDIFF (-)    Dysphagia  Assessment & Plan  -due to stroke  -SLP following  -NPO with Cortrack with tube feedings  -Aspiration Precautions     Acute cystitis without hematuria- (present on admission)  Assessment & Plan  -E. Coli   -completed ABX 8/11/19  -afebrile    Tonsillar enlargement- (present on admission)  Assessment & Plan  -Incidental finding on CT  -Will need further work-up depending on clinical recovery    Anemia- (present on admission)  Assessment & Plan  -chronic and stable at her baseline      Acute encephalopathy- (present on admission)  Assessment & Plan  -likely multi-factorial - acute cystitis (s/p treatment), CVA, worsening baseline dementia, hospitalization  -arouses to voice today. Answers orientation questions correctly.   -no behavioral outbursts  -frequent reorientation, sleep hygiene  -Fall Precautions  -avoid benzos/narcotics  Patient family agree with hospice    DNR (do not resuscitate)- (present on admission)  Assessment & Plan  -son would still want patient intubated. Ongoing education & counseling  -Hospice        CAD (coronary artery disease)- (present on admission)  Assessment & Plan  -continue ASA and statin      Falls frequently- (present on admission)  Assessment & Plan  -now s/p CVA with left-sided flaccidity  -plan to DC on Hospice  -Fall Precautions      Dementia- (present on admission)  Assessment & Plan  -difficult to ascertain her baseline  -continue Aricept  -frequent reorientation  -Fall precautions  -Plan to DC to home or facility on hospice          Patient require safe discharge plan with hospice to go to a facility because patient's family cannot take care of patient at home with hospice.  Pending group home acceptance.    VTE prophylaxis: SCD    Glory Mendiola M.D.      Current Facility-Administered Medications:   •  acetaminophen (TYLENOL) tablet 500 mg, 500 mg, Enteral Tube, Q6HRS, KENN Dodson.P.R.N., 500 mg at  08/23/19 1231  •  ipratropium-albuterol (DUONEB) nebulizer solution, 3 mL, Nebulization, Q4H PRN (RT), Stacy Roberts M.D.  •  Respiratory Care per Protocol, , Nebulization, Continuous RT, Stacy Roberts M.D.  •  albuterol inhaler 2 Puff, 2 Puff, Inhalation, Q4HRS PRN, Syl Fuentes M.D., 2 Puff at 08/12/19 0430  •  aspirin (ASA) chewable tab 81 mg, 81 mg, Enteral Tube, DAILY, Syl Fuentes M.D., 81 mg at 08/23/19 0422  •  Pharmacy Consult: Enteral tube insertion - review meds/change route/product selection, 1 Each, Other, PHARMACY TO DOSE, Messi Mann M.D.  •  ondansetron (ZOFRAN ODT) dispertab 4 mg, 4 mg, Enteral Tube, Q4HRS PRN, Messi Mann M.D.  •  atorvastatin (LIPITOR) tablet 40 mg, 40 mg, Enteral Tube, Nightly, Messi Mann M.D., 40 mg at 08/22/19 2219  •  levothyroxine (SYNTHROID) tablet 25 mcg, 25 mcg, Enteral Tube, AM ES, Messi Mann M.D., 25 mcg at 08/23/19 0422  •  donepezil (ARICEPT) tablet 10 mg, 10 mg, Enteral Tube, Q EVENING, Messi Mann M.D., 10 mg at 08/22/19 1636  •  labetalol (NORMODYNE,TRANDATE) injection 10 mg, 10 mg, Intravenous, Q4HRS PRN, Carlos Gay M.D.  •  enalaprilat (VASOTEC) injection 1.25 mg, 1.25 mg, Intravenous, Q6HRS PRN, Carlos Gay M.D.  •  ondansetron (ZOFRAN) syringe/vial injection 4 mg, 4 mg, Intravenous, Q4HRS PRN, Glory Mendiola M.D.

## 2019-08-23 NOTE — DIETARY
Nutrition support weekly update:  Day 15 of admit.  Dominique King is a 88 y.o. female with admitting DX of CVA.      Tube feeding initiated on 8/9. Current TF via gastric Cortrak is Replete Fiber, goal rate 50 ml/hr, providing 1200 kcals, 77 grams protein, 996 mL free water. Per I/Os, TF is running @ goal rate.     Assessment:  Weight 8/17 = 59.9 kg per bed scale.   Down slightly from weight on 8/9, however remains up 2.4 kg from admit weight.    Evaluation:   1. Discussed POC with MD, plan is to continue NG feeds until d/c, which will likely be home with hospice (possible d/c Monday)  2. MAR: synthroid  3. No IVFs running at this time, however pt receiving free water flushes of 200 ml QID - total free water is 1796 ml per day.   4. Labs: pre-alb 18.0 (8/19) and CRP 0.25 (8/19)  5. Current feeding remains appropriate to meet nutritional and hydration needs    Malnutrition risk: No risk identified at this time    Recommendations/Plan:  1. Continue TF formula and rate; Replete Fiber @ 50 ml/hr   2. Monitor weight     RD following

## 2019-08-23 NOTE — PROGRESS NOTES
[facility] Medicine Daily Progress Note    Date of Service  8/22/2019    Chief Complaint  Left-sided weakness and vision changes    Hospital Course   This is an 88-year-old female with PMH significant for dementia, CAD and frequent falls who presented 8/15/2019 with GLF and left hemineglect.  Initial head CT on admission showed no acute findings.  MRI showed moderate-sized acute right MCA infarct with hemorrhagic transformation and she underwent thrombectomy.      Interval Problem Update    8/20. C diff (-).  Patient overnight remained comfortable.  Currently pending hospice to accept patient.  Pending placement.  Family agree with hospice and interested.   to continue outpatient with DC plan.  Patient complains of general body achiness. Patient's pain is general 2-3/10, intermittent and does not radiate to other location, sharp and with some tingling. Can be controlled by pain meds.   8/21.  Patient is relatively comfortable today.  Still awaiting for discharge plan for outpatient hospice.  Patient's family is still actively seeking placement for hospice. Patient otherwise denies fever, chills, nausea, vomiting, adb pain, SOB, CP, headache, constipation, diarrhea, cough, or sputum.  8/22.  Patient is still very lethargic.  Awaiting for placement for hospice.  Continue work with  and family for safe discharge plan.  Patient complains of general body achiness. Patient's pain is general 1-2/10, intermittent and does not radiate to other location, sharp and with some tingling. Can be controlled by pain meds.       Consultants/Specialty  -Pulmonary/Crticial Care - signed off  -Palliative Care  -Hospice  -Neurology     Code Status  DNAR - Intubation OK    Disposition  Either to a facility or home with Hospice.    Review of Systems  Review of Systems   Unable to perform ROS: Mental acuity (Limited )        Physical Exam  Temp:  [36.1 °C (97 °F)-36.7 °C (98 °F)] 36.4 °C (97.5 °F)  Pulse:  [82-96]  96  Resp:  [16] 16  BP: (109-134)/(69-81) 130/81  SpO2:  [94 %-100 %] 100 %    Physical Exam   Constitutional: Vital signs are normal. She appears well-nourished. She appears lethargic. She is sleeping and cooperative. She is easily aroused. She has a sickly appearance. No distress.   HENT:   Right Ear: Hearing and external ear normal.   Left Ear: Hearing and external ear normal.   Nose: Nose normal.   Mouth/Throat: Oropharynx is clear and moist. Mucous membranes are dry and not cyanotic. No oropharyngeal exudate.   Cortrack left nare.   Eyes: Conjunctivae are normal. Right eye exhibits no discharge. Left eye exhibits no discharge. Left eye exhibits abnormal extraocular motion.   Neck: Normal range of motion. No tracheal deviation present.   Cardiovascular: Normal rate, regular rhythm and intact distal pulses. Exam reveals no gallop.   Murmur heard.  No edema   Pulmonary/Chest: Effort normal. No respiratory distress. She has decreased breath sounds. She has no rales. She exhibits no tenderness.   Mild upper airway congestion.  Poor cough.    Abdominal: Soft. Normal appearance and bowel sounds are normal. She exhibits no distension and no mass. There is no rigidity and no rebound.   Incontinent diarrhea stools   Musculoskeletal: Normal range of motion. She exhibits no tenderness.   Left-sided flaccidity   Lymphadenopathy:     She has no cervical adenopathy.   Neurological: She is easily aroused. She appears lethargic. She displays atrophy. A cranial nerve deficit is present. She exhibits abnormal muscle tone. She displays no seizure activity. Coordination abnormal. GCS eye subscore is 3. GCS verbal subscore is 2. GCS motor subscore is 5.   RUE 4/5  RLE 4/5  LUE 0/5  LLE 0/5       Skin: Skin is warm and dry. No rash noted.   Psychiatric: Her behavior is normal. Her speech is delayed. She exhibits abnormal remote memory.   Nursing note and vitals reviewed.      Fluids    Intake/Output Summary (Last 24 hours) at  8/22/2019 1706  Last data filed at 8/22/2019 1600  Gross per 24 hour   Intake 1600 ml   Output 1250 ml   Net 350 ml       Laboratory                        Imaging  [id number] VIEW   Final Result         1.  Nonspecific bowel gas pattern.   2.  Dobbhoff tube with tip terminating overlying the expected location of the first or second duodenal segment.      [id number] VIEW   Final Result         1.  Nonspecific bowel gas pattern.   2.  Dobbhoff tube tip terminates overlying the expected location of the gastric antrum.      CT-HEAD W/O   Final Result      1.  Acute right MCA infarcts with hemorrhagic transformation seen in the right temporal lobe. No significant change from prior brain MRI given differences in technique.   2.  Atrophy and chronic microvascular ischemic type changes.      EC-ECHOCARDIOGRAM COMPLETE W/O CONT   Final Result      TW-[id number]-1 VIEW   Final Result         Feeding tube with tip projecting over the expected area of the distal stomach.      DX-ABDOMEN FOR TUBE PLACEMENT   Final Result      Feeding tube tip projects over the distal stomach or duodenal bulb.      DX-ABDOMEN FOR TUBE PLACEMENT   Final Result      1.  Feeding tube tip projects near the GE junction.      MR-BRAIN-W/O   Final Result      1.  Moderate-sized acute right MCA territory infarct with hemorrhagic transformation.   2.  Moderate diffuse cervical substance loss.   3.  Advanced microangiopathic ischemic change versus demyelination or gliosis.   4.  Chronic ischemic pontine gliosis.   5.  Findings of pansinusitis.      These findings were discussed with MORIS CANTU on 8/9/2019 2:50 PM. These findings were discussed with Dr. Mahendra Mann for Dr. MORIS CANTU on 8/9/2019 2:56 PM.      DX-CHEST-PORTABLE (1 VIEW)   Final Result      1.  No acute cardiopulmonary disease.   2.  Contrast present within mildly dilated LEFT renal collecting system.      IR-THROMBO MECHANICAL ARTERY,INIT   Final Result      1.  Occlusion of the  right middle cerebral artery, M2 segment.      2.  Successful cerebral thrombectomy performed with post intervention angiogram demonstrating patent right middle cerebral artery vessels.      Findings were discussed with Dr. Miramontes at approximately 1755 hrs.      OUTSIDE IMAGES-CT HEAD   Final Result      OUTSIDE IMAGES-CT HEAD   Final Result      CT-CTA NECK WITH & W/O-POST PROCESSING   Final Result      Moderate calcified plaque right carotid artery bifurcation. No significant stenosis.   Left carotid arteries appear patent.   Vertebral arteries appear patent.   Focal enlargement of right lingual tonsils measuring 12.6 mm in diameter. Patient's condition permits, would consider further evaluation.      CT-CEREBRAL PERFUSION ANALYSIS   Final Result      1.  Cerebral blood flow less than 30% likely representing completed infarct = 38 mL.      2.  T Max more than 6 seconds likely representing combination of completed infarct and ischemia = 98 mL.      3.  Mismatched volume likely representing ischemic brain/penumbra = 60 mL      4.  Please note that the cerebral perfusion was performed on the limited brain tissue around the basal ganglia region. Infarct/ischemia outside the CT perfusion sections can be missed in this study.      CT-CTA HEAD WITH & W/O-POST PROCESS    (Results Pending)        Assessment/Plan  * CVA (cerebral vascular accident) (HCC)- (present on admission)  Assessment & Plan  -Status post thrombectomy  -MRI with hemorrhagic transformation   -dysphagia requiring Cortrack for tube feedings.   -expressive aphasia  -left-sided neglect and extremity flaccidity  -c/o headaches today  -Palliative Care recommended Hospice consult  -ASA and statin  -CM/SS assisting with placement/Hospice/Family  Patient accepted by hospice service however family unable to take care of patient at home with hospice.  Pending group home placement and acceptance by .    Pending safe discharge plan with hospice.   Patient family confirmed patient's CODE STATUS as DNR AR/I okay.    Diarrhea of presumed infectious origin  Assessment & Plan  - currently stopped, probably because of recent use of antibiotics.  -C. difficile negative    Dysphagia  Assessment & Plan  -due to stroke  -SLP following  -NPO with Cortrack with tube feedings  -Aspiration Precautions     Acute cystitis without hematuria- (present on admission)  Assessment & Plan  -E. Coli   -completed ABX 8/11/19  -afebrile  Continue monitor    Tonsillar enlargement- (present on admission)  Assessment & Plan  -Incidental finding on CT  -Will need further work-up depending on clinical recovery  Patient currently have no signs of respiratory distress    Anemia- (present on admission)  Assessment & Plan  -chronic and stable at her baseline      Acute encephalopathy- (present on admission)  Assessment & Plan  -likely multi-factorial - acute cystitis (s/p treatment), CVA, worsening baseline dementia, hospitalization  -arouses to voice today. Answers orientation questions correctly.   -no behavioral outbursts  -frequent reorientation, sleep hygiene  -Fall Precautions  -avoid benzos/narcotics  Patient accepted by hospice service however family unable to take care of patient at home with hospice.  Pending group home placement and acceptance by .  Interventions to be considered: Keep patient awake during the dayand avoid day time naps. Remove all unnecessary lines (central lines, peripheral IVs, feeding tubes, calvert catheters). Avoid polypharmacy, frequent re-orientation, maximize family time at bedside, use glasses and hearing aids if needed, treat pain, encourage ambulation, minimize benzos/anticholinergic agents.       DNR (do not resuscitate)- (present on admission)  Assessment & Plan  -son would still want patient intubated. Ongoing education & counseling  -Hospice  Pending outpatient hospice to set up      CAD (coronary artery disease)- (present on  admission)  Assessment & Plan  -continue ASA and statin      Falls frequently- (present on admission)  Assessment & Plan  -now s/p CVA with left-sided flaccidity  -plan to DC on Hospice  -Fall Precautions      Dementia- (present on admission)  Assessment & Plan  -difficult to ascertain her baseline  -continue Aricept  -frequent reorientation  -Fall precautions  -Plan to DC to home or facility on hospice    Patient require safe discharge plan with hospice to go to a facility because patient's family cannot take care of patient at home with hospice.  Pending group home acceptance.    VTE prophylaxis: SCD    Glory Mendiola M.D.      Current Facility-Administered Medications:   •  [CANCELED] Special Contact Isolation, , , CONTINUOUS **AND** [COMPLETED] C Diff by PCR rflx Toxin, , , Once **AND** Pharmacy Consult Request, 1 Each, Other, PHARMACY TO DOSE, Kalyn Leroy, A.P.R.NKatelynn  •  acetaminophen (TYLENOL) tablet 500 mg, 500 mg, Enteral Tube, Q6HRS, Kalyn Leroy, A.P.R.N., 500 mg at 08/22/19 1636  •  ipratropium-albuterol (DUONEB) nebulizer solution, 3 mL, Nebulization, Q4H PRN (RT), Stacy Roberts M.D.  •  Respiratory Care per Protocol, , Nebulization, Continuous RT, Stacy Roberts M.D.  •  albuterol inhaler 2 Puff, 2 Puff, Inhalation, Q4HRS PRN, Syl Fuentes M.D., 2 Puff at 08/12/19 0430  •  aspirin (ASA) chewable tab 81 mg, 81 mg, Enteral Tube, DAILY, Syl Fuentes M.D., 81 mg at 08/22/19 0427  •  Pharmacy Consult: Enteral tube insertion - review meds/change route/product selection, 1 Each, Other, PHARMACY TO DOSE, Messi Mann M.D.  •  ondansetron (ZOFRAN ODT) dispertab 4 mg, 4 mg, Enteral Tube, Q4HRS PRN, Messi Mann M.D.  •  atorvastatin (LIPITOR) tablet 40 mg, 40 mg, Enteral Tube, Nightly, TESSIE BarnardD., 40 mg at 08/21/19 5944  •  levothyroxine (SYNTHROID) tablet 25 mcg, 25 mcg, Enteral Tube, AM ES, Messi Mann M.D., 25 mcg at 08/22/19 4260  •  donepezil (ARICEPT)  tablet 10 mg, 10 mg, Enteral Tube, Q EVENING, Messi Mann M.D., 10 mg at 08/22/19 1636  •  labetalol (NORMODYNE,TRANDATE) injection 10 mg, 10 mg, Intravenous, Q4HRS PRN, Carlos Gay M.D.  •  enalaprilat (VASOTEC) injection 1.25 mg, 1.25 mg, Intravenous, Q6HRS PRN, Carlos Gay M.D.  •  ondansetron (ZOFRAN) syringe/vial injection 4 mg, 4 mg, Intravenous, Q4HRS PRN, Glory Mendiola M.D.

## 2019-08-23 NOTE — PROGRESS NOTES
Pt AOx4, dysarthria noted. Pt son Mikael at bedside. He states he should have things ready for pt at home by Monday. Pt c/o headache, medicated with scheduled tylenol. Fall and aspiration precautions in place.

## 2019-08-23 NOTE — CARE PLAN
Problem: Communication  Goal: The ability to communicate needs accurately and effectively will improve  Outcome: PROGRESSING AS EXPECTED  Intervention: Educate patient and significant other/support system about the plan of care, procedures, treatments, medications and allow for questions  Note:   Fall and aspiration precautions in place.      Problem: Discharge Barriers/Planning  Goal: Patient's continuum of care needs will be met  Outcome: PROGRESSING SLOWER THAN EXPECTED  Note:   Plan now is for family to take pt home with hospice- care management working on planning, possibly d/c Monday.

## 2019-08-24 PROCEDURE — 700102 HCHG RX REV CODE 250 W/ 637 OVERRIDE(OP): Performed by: NURSE PRACTITIONER

## 2019-08-24 PROCEDURE — 700102 HCHG RX REV CODE 250 W/ 637 OVERRIDE(OP): Performed by: INTERNAL MEDICINE

## 2019-08-24 PROCEDURE — A9270 NON-COVERED ITEM OR SERVICE: HCPCS | Performed by: NURSE PRACTITIONER

## 2019-08-24 PROCEDURE — 700111 HCHG RX REV CODE 636 W/ 250 OVERRIDE (IP): Performed by: INTERNAL MEDICINE

## 2019-08-24 PROCEDURE — 99231 SBSQ HOSP IP/OBS SF/LOW 25: CPT | Performed by: INTERNAL MEDICINE

## 2019-08-24 PROCEDURE — 770006 HCHG ROOM/CARE - MED/SURG/GYN SEMI*

## 2019-08-24 PROCEDURE — A9270 NON-COVERED ITEM OR SERVICE: HCPCS | Performed by: INTERNAL MEDICINE

## 2019-08-24 PROCEDURE — A9270 NON-COVERED ITEM OR SERVICE: HCPCS | Performed by: HOSPITALIST

## 2019-08-24 PROCEDURE — 700102 HCHG RX REV CODE 250 W/ 637 OVERRIDE(OP): Performed by: HOSPITALIST

## 2019-08-24 RX ADMIN — ONDANSETRON 4 MG: 2 INJECTION INTRAMUSCULAR; INTRAVENOUS at 12:42

## 2019-08-24 RX ADMIN — ACETAMINOPHEN 500 MG: 500 TABLET ORAL at 00:13

## 2019-08-24 RX ADMIN — ACETAMINOPHEN 500 MG: 500 TABLET ORAL at 17:23

## 2019-08-24 RX ADMIN — ASPIRIN 81 MG 81 MG: 81 TABLET ORAL at 04:51

## 2019-08-24 RX ADMIN — DONEPEZIL HYDROCHLORIDE 10 MG: 5 TABLET, FILM COATED ORAL at 17:23

## 2019-08-24 RX ADMIN — ACETAMINOPHEN 500 MG: 500 TABLET ORAL at 04:50

## 2019-08-24 RX ADMIN — LEVOTHYROXINE SODIUM 25 MCG: 25 TABLET ORAL at 04:54

## 2019-08-24 RX ADMIN — ACETAMINOPHEN 500 MG: 500 TABLET ORAL at 12:41

## 2019-08-24 RX ADMIN — ATORVASTATIN CALCIUM 40 MG: 40 TABLET, FILM COATED ORAL at 17:23

## 2019-08-24 NOTE — CARE PLAN
Problem: Fluid Volume:  Goal: Will maintain balanced intake and output  Outcome: PROGRESSING AS EXPECTED  Intervention: Monitor, educate, and encourage compliance with therapeutic intake of liquids  Note:   Pt NPO, but on tube feedings- followed by dietitian.      Problem: Skin Integrity  Goal: Risk for impaired skin integrity will decrease  Outcome: PROGRESSING SLOWER THAN EXPECTED  Intervention: Implement precautions to protect skin integrity in collaboration with the interdisciplinary team  Note:   Precautions in place.   Intervention: Assess and monitor skin integrity, appearance and/or temperature  Note:   2 RN skin check each shift.

## 2019-08-24 NOTE — PROGRESS NOTES
Pt AAOX4, left sided paralysis and neglect present. Pt NPO w/ cortrak, Q2hr turns in place. Purewick for incontinence.

## 2019-08-24 NOTE — PROGRESS NOTES
2 RN skin check complete. Skin tear on dorsal portion of left hand with dressing in place. Scab on dorsal portion of left foot open to air. Incontinence associated dermatitis in groin and [place] area.

## 2019-08-24 NOTE — PROGRESS NOTES
2 RN skin check complete with Felesha, RN: Skin tear noted to Left foot, left, knee, and left hand. Excoriation and incontinence related breakdown noted to bilateral groins, and butt- small open areas noted to left butt and left upper thigh appears to be from moisture. Purewick in place, applied barrier cream.

## 2019-08-24 NOTE — CARE PLAN
Problem: Skin Integrity  Goal: Risk for impaired skin integrity will decrease  Outcome: PROGRESSING AS EXPECTED   Q2hr turns in place  Problem: Urinary Elimination:  Goal: Ability to reestablish a normal urinary elimination pattern will improve  Outcome: PROGRESSING AS EXPECTED   Purewick in use for incontinence

## 2019-08-25 PROBLEM — R19.7 DIARRHEA OF PRESUMED INFECTIOUS ORIGIN: Status: RESOLVED | Noted: 2019-08-19 | Resolved: 2019-08-25

## 2019-08-25 PROBLEM — N30.00 ACUTE CYSTITIS WITHOUT HEMATURIA: Status: RESOLVED | Noted: 2019-08-09 | Resolved: 2019-08-25

## 2019-08-25 LAB
ALBUMIN SERPL BCP-MCNC: 3.3 G/DL (ref 3.2–4.9)
ALBUMIN/GLOB SERPL: 1 G/DL
ALP SERPL-CCNC: 76 U/L (ref 30–99)
ALT SERPL-CCNC: 16 U/L (ref 2–50)
ANION GAP SERPL CALC-SCNC: 5 MMOL/L (ref 0–11.9)
AST SERPL-CCNC: 25 U/L (ref 12–45)
BASOPHILS # BLD AUTO: 0.3 % (ref 0–1.8)
BASOPHILS # BLD: 0.04 K/UL (ref 0–0.12)
BILIRUB SERPL-MCNC: 0.3 MG/DL (ref 0.1–1.5)
BUN SERPL-MCNC: 31 MG/DL (ref 8–22)
CALCIUM SERPL-MCNC: 8.6 MG/DL (ref 8.5–10.5)
CHLORIDE SERPL-SCNC: 98 MMOL/L (ref 96–112)
CO2 SERPL-SCNC: 29 MMOL/L (ref 20–33)
CREAT SERPL-MCNC: 0.67 MG/DL (ref 0.5–1.4)
EOSINOPHIL # BLD AUTO: 0.36 K/UL (ref 0–0.51)
EOSINOPHIL NFR BLD: 2.9 % (ref 0–6.9)
ERYTHROCYTE [DISTWIDTH] IN BLOOD BY AUTOMATED COUNT: 45.4 FL (ref 35.9–50)
GLOBULIN SER CALC-MCNC: 3.2 G/DL (ref 1.9–3.5)
GLUCOSE SERPL-MCNC: 105 MG/DL (ref 65–99)
HCT VFR BLD AUTO: 36 % (ref 37–47)
HGB BLD-MCNC: 11.3 G/DL (ref 12–16)
IMM GRANULOCYTES # BLD AUTO: 0.07 K/UL (ref 0–0.11)
IMM GRANULOCYTES NFR BLD AUTO: 0.6 % (ref 0–0.9)
LYMPHOCYTES # BLD AUTO: 2.51 K/UL (ref 1–4.8)
LYMPHOCYTES NFR BLD: 20.4 % (ref 22–41)
MAGNESIUM SERPL-MCNC: 2.1 MG/DL (ref 1.5–2.5)
MCH RBC QN AUTO: 29.9 PG (ref 27–33)
MCHC RBC AUTO-ENTMCNC: 31.4 G/DL (ref 33.6–35)
MCV RBC AUTO: 95.2 FL (ref 81.4–97.8)
MONOCYTES # BLD AUTO: 1.19 K/UL (ref 0–0.85)
MONOCYTES NFR BLD AUTO: 9.7 % (ref 0–13.4)
NEUTROPHILS # BLD AUTO: 8.12 K/UL (ref 2–7.15)
NEUTROPHILS NFR BLD: 66.1 % (ref 44–72)
NRBC # BLD AUTO: 0 K/UL
NRBC BLD-RTO: 0 /100 WBC
PHOSPHATE SERPL-MCNC: 3.5 MG/DL (ref 2.5–4.5)
PLATELET # BLD AUTO: 335 K/UL (ref 164–446)
PMV BLD AUTO: 9.2 FL (ref 9–12.9)
POTASSIUM SERPL-SCNC: 4.4 MMOL/L (ref 3.6–5.5)
PROT SERPL-MCNC: 6.5 G/DL (ref 6–8.2)
RBC # BLD AUTO: 3.78 M/UL (ref 4.2–5.4)
SODIUM SERPL-SCNC: 132 MMOL/L (ref 135–145)
WBC # BLD AUTO: 12.3 K/UL (ref 4.8–10.8)

## 2019-08-25 PROCEDURE — 84100 ASSAY OF PHOSPHORUS: CPT

## 2019-08-25 PROCEDURE — A9270 NON-COVERED ITEM OR SERVICE: HCPCS | Performed by: NURSE PRACTITIONER

## 2019-08-25 PROCEDURE — 700102 HCHG RX REV CODE 250 W/ 637 OVERRIDE(OP): Performed by: HOSPITALIST

## 2019-08-25 PROCEDURE — 770006 HCHG ROOM/CARE - MED/SURG/GYN SEMI*

## 2019-08-25 PROCEDURE — A9270 NON-COVERED ITEM OR SERVICE: HCPCS | Performed by: INTERNAL MEDICINE

## 2019-08-25 PROCEDURE — 700102 HCHG RX REV CODE 250 W/ 637 OVERRIDE(OP): Performed by: NURSE PRACTITIONER

## 2019-08-25 PROCEDURE — A9270 NON-COVERED ITEM OR SERVICE: HCPCS | Performed by: HOSPITALIST

## 2019-08-25 PROCEDURE — 80053 COMPREHEN METABOLIC PANEL: CPT

## 2019-08-25 PROCEDURE — 700111 HCHG RX REV CODE 636 W/ 250 OVERRIDE (IP): Performed by: INTERNAL MEDICINE

## 2019-08-25 PROCEDURE — 99231 SBSQ HOSP IP/OBS SF/LOW 25: CPT | Performed by: INTERNAL MEDICINE

## 2019-08-25 PROCEDURE — 83735 ASSAY OF MAGNESIUM: CPT

## 2019-08-25 PROCEDURE — 85025 COMPLETE CBC W/AUTO DIFF WBC: CPT

## 2019-08-25 PROCEDURE — 36415 COLL VENOUS BLD VENIPUNCTURE: CPT

## 2019-08-25 PROCEDURE — 51798 US URINE CAPACITY MEASURE: CPT

## 2019-08-25 PROCEDURE — 700102 HCHG RX REV CODE 250 W/ 637 OVERRIDE(OP): Performed by: INTERNAL MEDICINE

## 2019-08-25 RX ADMIN — ACETAMINOPHEN 500 MG: 500 TABLET ORAL at 11:06

## 2019-08-25 RX ADMIN — ATORVASTATIN CALCIUM 40 MG: 40 TABLET, FILM COATED ORAL at 17:39

## 2019-08-25 RX ADMIN — DONEPEZIL HYDROCHLORIDE 10 MG: 5 TABLET, FILM COATED ORAL at 17:39

## 2019-08-25 RX ADMIN — LEVOTHYROXINE SODIUM 25 MCG: 25 TABLET ORAL at 04:37

## 2019-08-25 RX ADMIN — ASPIRIN 81 MG 81 MG: 81 TABLET ORAL at 04:37

## 2019-08-25 RX ADMIN — ACETAMINOPHEN 500 MG: 500 TABLET ORAL at 00:11

## 2019-08-25 RX ADMIN — ACETAMINOPHEN 500 MG: 500 TABLET ORAL at 17:39

## 2019-08-25 RX ADMIN — ACETAMINOPHEN 500 MG: 500 TABLET ORAL at 04:37

## 2019-08-25 RX ADMIN — ONDANSETRON 4 MG: 2 INJECTION INTRAMUSCULAR; INTRAVENOUS at 04:42

## 2019-08-25 RX ADMIN — ACETAMINOPHEN 500 MG: 500 TABLET ORAL at 23:59

## 2019-08-25 NOTE — PROGRESS NOTES
[facility] Medicine Daily Progress Note    Date of Service  8/24/2019    Chief Complaint  Left-sided weakness and vision changes    Hospital Course   This is an 88-year-old female with PMH significant for dementia, CAD and frequent falls who presented 8/15/2019 with GLF and left hemineglect.  Initial head CT on admission showed no acute findings.  MRI showed moderate-sized acute right MCA infarct with hemorrhagic transformation and she underwent thrombectomy.      Interval Problem Update    8/20. C diff (-).  Patient overnight remained comfortable.  Currently pending hospice to accept patient.  Pending placement.  Family agree with hospice and interested.   to continue outpatient with DC plan.  Patient complains of general body achiness. Patient's pain is general 2-3/10, intermittent and does not radiate to other location, sharp and with some tingling. Can be controlled by pain meds.   8/21.  Patient is relatively comfortable today.  Still awaiting for discharge plan for outpatient hospice.  Patient's family is still actively seeking placement for hospice. Patient otherwise denies fever, chills, nausea, vomiting, adb pain, SOB, CP, headache, constipation, diarrhea, cough, or sputum.  8/22.  Patient is still very lethargic.  Awaiting for placement for hospice.  Continue work with  and family for safe discharge plan.  Patient complains of general body achiness. Patient's pain is general 1-2/10, intermittent and does not radiate to other location, sharp and with some tingling. Can be controlled by pain meds.   8/23.  Patient remained stable still pending hospice to set up. Patient otherwise denies fever, chills, nausea, vomiting, adb pain, SOB, CP, headache, constipation, diarrhea, cough, or sputum.  08/24- lethargic. Pending hospice placement     Consultants/Specialty  -Pulmonary/Crticial Care - signed off  -Palliative Care  -Hospice  -Neurology     Code Status  DNAR - Intubation  OK    Disposition  Either to a facility or home with Hospice.    Review of Systems  Review of Systems   Unable to perform ROS: Mental acuity (Limited )        Physical Exam  Temp:  [36.1 °C (96.9 °F)-36.6 °C (97.8 °F)] 36.3 °C (97.3 °F)  Pulse:  [75-93] 93  Resp:  [16-17] 16  BP: ()/(64-77) 123/77  SpO2:  [91 %-94 %] 93 %    Physical Exam   Constitutional: Vital signs are normal. She appears lethargic. She is sleeping and cooperative. She is easily aroused. She has a sickly appearance. No distress.   HENT:   Right Ear: Hearing and external ear normal.   Left Ear: Hearing and external ear normal.   Nose: Nose normal.   Mouth/Throat: Oropharynx is clear and moist. Mucous membranes are dry and not cyanotic. No oropharyngeal exudate.   Cortrack left nare.   Eyes: Conjunctivae are normal. Left eye exhibits no discharge. Left eye exhibits abnormal extraocular motion.   Neck: Neck supple. No tracheal deviation present. No thyromegaly present.   Cardiovascular: Normal rate, regular rhythm and intact distal pulses. Exam reveals no gallop.   Murmur heard.  No edema   Pulmonary/Chest: Effort normal. No respiratory distress. She has decreased breath sounds. She exhibits no tenderness.   Mild upper airway congestion.  Poor cough.    Abdominal: Soft. Normal appearance and bowel sounds are normal. She exhibits no distension. There is no rigidity, no rebound and no guarding.   Incontinent diarrhea stools   Musculoskeletal: Normal range of motion. She exhibits no tenderness.   Left-sided flaccidity   Neurological: She is easily aroused. She appears lethargic. She displays atrophy. She exhibits normal muscle tone. She displays no seizure activity. GCS eye subscore is 3. GCS verbal subscore is 2. GCS motor subscore is 5.   Not cooperating      Skin: Skin is warm and dry. No rash noted. She is not diaphoretic.   Psychiatric: Her behavior is normal. Her speech is delayed. She exhibits abnormal remote memory.   Nursing note and  vitals reviewed.      Fluids    Intake/Output Summary (Last 24 hours) at 8/24/2019 1923  Last data filed at 8/24/2019 0800  Gross per 24 hour   Intake 800 ml   Output 700 ml   Net 100 ml       Laboratory                        Imaging  [id number] VIEW   Final Result         1.  Nonspecific bowel gas pattern.   2.  Dobbhoff tube with tip terminating overlying the expected location of the first or second duodenal segment.      [id number] VIEW   Final Result         1.  Nonspecific bowel gas pattern.   2.  Dobbhoff tube tip terminates overlying the expected location of the gastric antrum.      CT-HEAD W/O   Final Result      1.  Acute right MCA infarcts with hemorrhagic transformation seen in the right temporal lobe. No significant change from prior brain MRI given differences in technique.   2.  Atrophy and chronic microvascular ischemic type changes.      EC-ECHOCARDIOGRAM COMPLETE W/O CONT   Final Result      [id number] VIEW   Final Result         Feeding tube with tip projecting over the expected area of the distal stomach.      DX-ABDOMEN FOR TUBE PLACEMENT   Final Result      Feeding tube tip projects over the distal stomach or duodenal bulb.      DX-ABDOMEN FOR TUBE PLACEMENT   Final Result      1.  Feeding tube tip projects near the GE junction.      MR-BRAIN-W/O   Final Result      1.  Moderate-sized acute right MCA territory infarct with hemorrhagic transformation.   2.  Moderate diffuse cervical substance loss.   3.  Advanced microangiopathic ischemic change versus demyelination or gliosis.   4.  Chronic ischemic pontine gliosis.   5.  Findings of pansinusitis.      These findings were discussed with MORIS CANTU on 8/9/2019 2:50 PM. These findings were discussed with Dr. Mahendra Mann for Dr. MORIS CANTU on 8/9/2019 2:56 PM.      DX-CHEST-PORTABLE (1 VIEW)   Final Result      1.  No acute cardiopulmonary disease.   2.  Contrast present within mildly dilated LEFT renal collecting system.       IR-THROMBO MECHANICAL ARTERY,INIT   Final Result      1.  Occlusion of the right middle cerebral artery, M2 segment.      2.  Successful cerebral thrombectomy performed with post intervention angiogram demonstrating patent right middle cerebral artery vessels.      Findings were discussed with Dr. Miramontes at approximately 1755 hrs.      OUTSIDE IMAGES-CT HEAD   Final Result      OUTSIDE IMAGES-CT HEAD   Final Result      CT-CTA NECK WITH & W/O-POST PROCESSING   Final Result      Moderate calcified plaque right carotid artery bifurcation. No significant stenosis.   Left carotid arteries appear patent.   Vertebral arteries appear patent.   Focal enlargement of right lingual tonsils measuring 12.6 mm in diameter. Patient's condition permits, would consider further evaluation.      CT-CEREBRAL PERFUSION ANALYSIS   Final Result      1.  Cerebral blood flow less than 30% likely representing completed infarct = 38 mL.      2.  T Max more than 6 seconds likely representing combination of completed infarct and ischemia = 98 mL.      3.  Mismatched volume likely representing ischemic brain/penumbra = 60 mL      4.  Please note that the cerebral perfusion was performed on the limited brain tissue around the basal ganglia region. Infarct/ischemia outside the CT perfusion sections can be missed in this study.      CT-CTA HEAD WITH & W/O-POST PROCESS    (Results Pending)        Assessment/Plan    * CVA (cerebral vascular accident) (HCC)- (present on admission)  Assessment & Plan  -Status post thrombectomy  -MRI with hemorrhagic transformation   -dysphagia requiring Cortrack for tube feedings.   -expressive aphasia  -left-sided neglect and extremity flaccidity  -c/o headaches today  -Palliative Care recommended Hospice consult  -ASA and statin  -CM/SS assisting with placement/Hospice/Family  Patient family agree with hospice          Diarrhea of presumed infectious origin  Assessment & Plan  resolved  -DC'd bowel protocol  -she  has received IV ABX for UTI  -R/O CDIFF (-)    Dysphagia  Assessment & Plan  -due to stroke  -SLP following  -NPO with Cortrack with tube feedings  -Aspiration Precautions     Acute cystitis without hematuria- (present on admission)  Assessment & Plan  -E. Coli   -completed ABX 8/11/19  -afebrile    Tonsillar enlargement- (present on admission)  Assessment & Plan  -Incidental finding on CT  -Will need further work-up depending on clinical recovery    Anemia- (present on admission)  Assessment & Plan  -chronic and stable at her baseline      Acute encephalopathy- (present on admission)  Assessment & Plan  -likely multi-factorial - acute cystitis (s/p treatment), CVA, worsening baseline dementia, hospitalization  -arouses to voice today. Answers orientation questions correctly.   -no behavioral outbursts  -frequent reorientation, sleep hygiene  -Fall Precautions  -avoid benzos/narcotics  Patient family agree with hospice    DNR (do not resuscitate)- (present on admission)  Assessment & Plan  -son would still want patient intubated. Ongoing education & counseling  -Hospice        CAD (coronary artery disease)- (present on admission)  Assessment & Plan  -continue ASA and statin      Falls frequently- (present on admission)  Assessment & Plan  -now s/p CVA with left-sided flaccidity  -plan to DC on Hospice  -Fall Precautions      Dementia- (present on admission)  Assessment & Plan  -difficult to ascertain her baseline  -continue Aricept  -frequent reorientation  -Fall precautions  -Plan to DC to home or facility on hospice          Patient require safe discharge plan with hospice to go to a facility because patient's family cannot take care of patient at home with hospice.     VTE prophylaxis: SCD    Abraham Liu M.D.      Current Facility-Administered Medications:   •  acetaminophen (TYLENOL) tablet 500 mg, 500 mg, Enteral Tube, Q6HRS, Kalyn Leroy, A.P.R.N., 500 mg at 08/24/19 1723  •  ipratropium-albuterol (DUONEB)  nebulizer solution, 3 mL, Nebulization, Q4H PRN (RT), Stacy Roberts M.D.  •  Respiratory Care per Protocol, , Nebulization, Continuous RT, Stacy Roberts M.D.  •  albuterol inhaler 2 Puff, 2 Puff, Inhalation, Q4HRS PRN, Syl Fuentes M.D., 2 Puff at 08/12/19 0430  •  aspirin (ASA) chewable tab 81 mg, 81 mg, Enteral Tube, DAILY, Syl Fuentes M.D., 81 mg at 08/24/19 0451  •  Pharmacy Consult: Enteral tube insertion - review meds/change route/product selection, 1 Each, Other, PHARMACY TO DOSE, Messi Mann M.D.  •  ondansetron (ZOFRAN ODT) dispertab 4 mg, 4 mg, Enteral Tube, Q4HRS PRN, Messi Mann M.D.  •  atorvastatin (LIPITOR) tablet 40 mg, 40 mg, Enteral Tube, Nightly, Messi Mann M.D., 40 mg at 08/24/19 1723  •  levothyroxine (SYNTHROID) tablet 25 mcg, 25 mcg, Enteral Tube, AM ES, Messi Mann M.D., 25 mcg at 08/24/19 0454  •  donepezil (ARICEPT) tablet 10 mg, 10 mg, Enteral Tube, Q EVENING, Messi Mann M.D., 10 mg at 08/24/19 1723  •  labetalol (NORMODYNE,TRANDATE) injection 10 mg, 10 mg, Intravenous, Q4HRS PRN, Carlos Gay M.D.  •  enalaprilat (VASOTEC) injection 1.25 mg, 1.25 mg, Intravenous, Q6HRS PRN, Carlos Gay M.D.  •  ondansetron (ZOFRAN) syringe/vial injection 4 mg, 4 mg, Intravenous, Q4HRS PRN, Glory Mendiola M.D., 4 mg at 08/24/19 1242

## 2019-08-25 NOTE — PROGRESS NOTES
2 RN skin check complete with OMID Clement: Skin tear noted to left foot, left knee, and left hand. Excoriation and incontinence related breakdown noted to bilateral groins, and butt- small open areas noted to left butt and left upper thigh appears to be moisture related.

## 2019-08-26 LAB
CRP SERPL HS-MCNC: 0.67 MG/DL (ref 0–0.75)
PREALB SERPL-MCNC: 18 MG/DL (ref 18–38)

## 2019-08-26 PROCEDURE — A9270 NON-COVERED ITEM OR SERVICE: HCPCS | Performed by: HOSPITALIST

## 2019-08-26 PROCEDURE — 84134 ASSAY OF PREALBUMIN: CPT

## 2019-08-26 PROCEDURE — 700102 HCHG RX REV CODE 250 W/ 637 OVERRIDE(OP): Performed by: NURSE PRACTITIONER

## 2019-08-26 PROCEDURE — 86140 C-REACTIVE PROTEIN: CPT

## 2019-08-26 PROCEDURE — 700102 HCHG RX REV CODE 250 W/ 637 OVERRIDE(OP): Performed by: HOSPITALIST

## 2019-08-26 PROCEDURE — 770006 HCHG ROOM/CARE - MED/SURG/GYN SEMI*

## 2019-08-26 PROCEDURE — A9270 NON-COVERED ITEM OR SERVICE: HCPCS | Performed by: INTERNAL MEDICINE

## 2019-08-26 PROCEDURE — A9270 NON-COVERED ITEM OR SERVICE: HCPCS | Performed by: NURSE PRACTITIONER

## 2019-08-26 PROCEDURE — 36415 COLL VENOUS BLD VENIPUNCTURE: CPT

## 2019-08-26 PROCEDURE — 99231 SBSQ HOSP IP/OBS SF/LOW 25: CPT | Performed by: INTERNAL MEDICINE

## 2019-08-26 PROCEDURE — 700102 HCHG RX REV CODE 250 W/ 637 OVERRIDE(OP): Performed by: INTERNAL MEDICINE

## 2019-08-26 RX ADMIN — ACETAMINOPHEN 500 MG: 500 TABLET ORAL at 12:00

## 2019-08-26 RX ADMIN — ACETAMINOPHEN 500 MG: 500 TABLET ORAL at 17:12

## 2019-08-26 RX ADMIN — ATORVASTATIN CALCIUM 40 MG: 40 TABLET, FILM COATED ORAL at 22:00

## 2019-08-26 RX ADMIN — ACETAMINOPHEN 500 MG: 500 TABLET ORAL at 23:15

## 2019-08-26 RX ADMIN — ACETAMINOPHEN 500 MG: 500 TABLET ORAL at 05:05

## 2019-08-26 RX ADMIN — ASPIRIN 81 MG 81 MG: 81 TABLET ORAL at 05:05

## 2019-08-26 RX ADMIN — LEVOTHYROXINE SODIUM 25 MCG: 25 TABLET ORAL at 05:05

## 2019-08-26 RX ADMIN — DONEPEZIL HYDROCHLORIDE 10 MG: 5 TABLET, FILM COATED ORAL at 17:12

## 2019-08-26 NOTE — PROGRESS NOTES
Pt AOx3, up to cardiac chair- out to patio per son request. C/o headache, scheduled tylenol given. Fall and aspiration precautions in place. Plan to D/C home with hospice tomorrow pending everything at son's house is ready.

## 2019-08-26 NOTE — PROGRESS NOTES
Hospital Medicine Daily Progress Note    Date of Service  8/25/2019    Chief Complaint  Left-sided weakness and vision changes    Hospital Course   This is an 88-year-old female with PMH significant for dementia, CAD and frequent falls who presented 8/15/2019 with GLF and left hemineglect.  Initial head CT on admission showed no acute findings.  MRI showed moderate-sized acute right MCA infarct with hemorrhagic transformation and she underwent thrombectomy.Inbitially she was monitored in ICU.  Core track was placed. some of encephalopathy had improved.  Also during this hospital stay she was treated for cystitis with ceftriaxone.  Also incidental finding of tonsillar enlargement and on the CT scan which she will need outpatient follow-up depends on her recovery.  Palliative care was consulted to discuss goals of care with family specifically her son.  Plan discharge home on hospice.       Interval Problem Update    8/20. C diff (-).  Patient overnight remained comfortable.  Currently pending hospice to accept patient.  Pending placement.  Family agree with hospice and interested.   to continue outpatient with DC plan.  Patient complains of general body achiness. Patient's pain is general 2-3/10, intermittent and does not radiate to other location, sharp and with some tingling. Can be controlled by pain meds.   8/21.  Patient is relatively comfortable today.  Still awaiting for discharge plan for outpatient hospice.  Patient's family is still actively seeking placement for hospice. Patient otherwise denies fever, chills, nausea, vomiting, adb pain, SOB, CP, headache, constipation, diarrhea, cough, or sputum.  8/22.  Patient is still very lethargic.  Awaiting for placement for hospice.  Continue work with  and family for safe discharge plan.  Patient complains of general body achiness. Patient's pain is general 1-2/10, intermittent and does not radiate to other location, sharp and with some  tingling. Can be controlled by pain meds.   8/23.  Patient remained stable still pending hospice to set up. Patient otherwise denies fever, chills, nausea, vomiting, adb pain, SOB, CP, headache, constipation, diarrhea, cough, or sputum.  08/24- lethargic. Pending hospice placement   08/25-continues to be lethargic.  Alert and oriented x2.  Pending hospice placement.    Consultants/Specialty  -Pulmonary/Crticial Care - signed off  -Palliative Care  -Hospice  -Neurology     Code Status  DNAR - Intubation OK    Disposition  Either to a facility or home with Hospice.    Review of Systems  Review of Systems   Unable to perform ROS: Mental acuity (Limited )        Physical Exam  Temp:  [36 °C (96.8 °F)-36.6 °C (97.9 °F)] 36.4 °C (97.5 °F)  Pulse:  [88-98] 88  Resp:  [17-20] 17  BP: (104-117)/(62-73) 117/72  SpO2:  [90 %-92 %] 92 %    Physical Exam   Constitutional: Vital signs are normal. She appears lethargic. She is sleeping and cooperative. She is easily aroused. She has a sickly appearance. No distress.   HENT:   Right Ear: Hearing and external ear normal.   Left Ear: Hearing and external ear normal.   Nose: Nose normal.   Mouth/Throat: Oropharynx is clear and moist. Mucous membranes are dry and not cyanotic. No oropharyngeal exudate.   Cortrack left nare.   Eyes: Conjunctivae are normal. Left eye exhibits no discharge. Left eye exhibits abnormal extraocular motion.   Neck: Neck supple. No tracheal deviation present. No thyromegaly present.   Cardiovascular: Normal rate, regular rhythm and intact distal pulses. Exam reveals no gallop.   Murmur heard.  No edema   Pulmonary/Chest: Effort normal. No respiratory distress. She has decreased breath sounds. She exhibits no tenderness.   Abdominal: Soft. Normal appearance and bowel sounds are normal. She exhibits no distension. There is no rigidity, no rebound and no guarding.   Musculoskeletal: Normal range of motion. She exhibits no tenderness.   Left-sided flaccidity    Neurological: She is easily aroused. She appears lethargic. She displays atrophy. She exhibits normal muscle tone. She displays no seizure activity. GCS eye subscore is 3. GCS verbal subscore is 2. GCS motor subscore is 5.   Not cooperating      Skin: Skin is warm and dry. No rash noted. She is not diaphoretic.   Psychiatric: Her behavior is normal. Her speech is delayed. She exhibits abnormal remote memory.   Nursing note and vitals reviewed.      Fluids    Intake/Output Summary (Last 24 hours) at 8/25/2019 1801  Last data filed at 8/25/2019 0815  Gross per 24 hour   Intake 800 ml   Output 0 ml   Net 800 ml       Laboratory  Recent Labs     08/25/19  0540   WBC 12.3*   RBC 3.78*   HEMOGLOBIN 11.3*   HEMATOCRIT 36.0*   MCV 95.2   MCH 29.9   MCHC 31.4*   RDW 45.4   PLATELETCT 335   MPV 9.2     Recent Labs     08/25/19  0540   SODIUM 132*   POTASSIUM 4.4   CHLORIDE 98   CO2 29   GLUCOSE 105*   BUN 31*   CREATININE 0.67   CALCIUM 8.6                   Imaging  [id number] VIEW   Final Result         1.  Nonspecific bowel gas pattern.   2.  Dobbhoff tube with tip terminating overlying the expected location of the first or second duodenal segment.      [id number] VIEW   Final Result         1.  Nonspecific bowel gas pattern.   2.  Dobbhoff tube tip terminates overlying the expected location of the gastric antrum.      CT-HEAD W/O   Final Result      1.  Acute right MCA infarcts with hemorrhagic transformation seen in the right temporal lobe. No significant change from prior brain MRI given differences in technique.   2.  Atrophy and chronic microvascular ischemic type changes.      EC-ECHOCARDIOGRAM COMPLETE W/O CONT   Final Result      [id number] VIEW   Final Result         Feeding tube with tip projecting over the expected area of the distal stomach.      DX-ABDOMEN FOR TUBE PLACEMENT   Final Result      Feeding tube tip projects over the distal stomach or duodenal bulb.      DX-ABDOMEN FOR TUBE PLACEMENT    Final Result      1.  Feeding tube tip projects near the GE junction.      MR-BRAIN-W/O   Final Result      1.  Moderate-sized acute right MCA territory infarct with hemorrhagic transformation.   2.  Moderate diffuse cervical substance loss.   3.  Advanced microangiopathic ischemic change versus demyelination or gliosis.   4.  Chronic ischemic pontine gliosis.   5.  Findings of pansinusitis.      These findings were discussed with MORIS CANTU on 8/9/2019 2:50 PM. These findings were discussed with Dr. Mahendra Mann for Dr. MORIS CANTU on 8/9/2019 2:56 PM.      DX-CHEST-PORTABLE (1 VIEW)   Final Result      1.  No acute cardiopulmonary disease.   2.  Contrast present within mildly dilated LEFT renal collecting system.      IR-THROMBO MECHANICAL ARTERY,INIT   Final Result      1.  Occlusion of the right middle cerebral artery, M2 segment.      2.  Successful cerebral thrombectomy performed with post intervention angiogram demonstrating patent right middle cerebral artery vessels.      Findings were discussed with Dr. Miramontes at approximately 1755 hrs.      OUTSIDE IMAGES-CT HEAD   Final Result      OUTSIDE IMAGES-CT HEAD   Final Result      CT-CTA NECK WITH & W/O-POST PROCESSING   Final Result      Moderate calcified plaque right carotid artery bifurcation. No significant stenosis.   Left carotid arteries appear patent.   Vertebral arteries appear patent.   Focal enlargement of right lingual tonsils measuring 12.6 mm in diameter. Patient's condition permits, would consider further evaluation.      CT-CEREBRAL PERFUSION ANALYSIS   Final Result      1.  Cerebral blood flow less than 30% likely representing completed infarct = 38 mL.      2.  T Max more than 6 seconds likely representing combination of completed infarct and ischemia = 98 mL.      3.  Mismatched volume likely representing ischemic brain/penumbra = 60 mL      4.  Please note that the cerebral perfusion was performed on the limited brain tissue around  the basal ganglia region. Infarct/ischemia outside the CT perfusion sections can be missed in this study.      CT-CTA HEAD WITH & W/O-POST PROCESS    (Results Pending)        Assessment/Plan    * CVA (cerebral vascular accident) (HCC)- (present on admission)  Assessment & Plan  -Status post thrombectomy  -MRI with hemorrhagic transformation   -dysphagia requiring Cortrack for tube feedings.   -expressive aphasia  -left-sided neglect and extremity flaccidity  -c/o headaches today  -Palliative Care recommended Hospice consult  -ASA and statin  -CM/SS assisting with placement/Hospice/Family  Patient family agree with hospice          Dysphagia  Assessment & Plan  -due to stroke  -SLP following  -NPO with Cortrack with tube feedings  -Aspiration Precautions     Tonsillar enlargement- (present on admission)  Assessment & Plan  -Incidental finding on CT  -Will need further work-up depending on clinical recovery    Anemia- (present on admission)  Assessment & Plan  -chronic and stable at her baseline      Acute encephalopathy- (present on admission)  Assessment & Plan  -likely multi-factorial - acute cystitis (s/p treatment), CVA, worsening baseline dementia, hospitalization  -arouses to voice today. Answers orientation questions correctly.   -no behavioral outbursts  -frequent reorientation, sleep hygiene  -Fall Precautions  -avoid benzos/narcotics  Patient family agree with hospice    DNR (do not resuscitate)- (present on admission)  Assessment & Plan  -son would still want patient intubated. Ongoing education & counseling  -Hospice        CAD (coronary artery disease)- (present on admission)  Assessment & Plan  -continue ASA and statin      Falls frequently- (present on admission)  Assessment & Plan  -now s/p CVA with left-sided flaccidity  -plan to DC on Hospice  -Fall Precautions      Dementia- (present on admission)  Assessment & Plan  -difficult to ascertain her baseline  -continue Aricept  -frequent  reorientation  -Fall precautions  -Plan to DC to home or facility on hospice          Patient require safe discharge plan with hospice to go to a facility because patient's family cannot take care of patient at home with hospice.     VTE prophylaxis: SCD    Abraham Liu M.D.      Current Facility-Administered Medications:   •  acetaminophen (TYLENOL) tablet 500 mg, 500 mg, Enteral Tube, Q6HRS, Kalyn Leroy, A.P.R.N., 500 mg at 08/25/19 1739  •  ipratropium-albuterol (DUONEB) nebulizer solution, 3 mL, Nebulization, Q4H PRN (RT), Stacy Roberts M.D.  •  Respiratory Care per Protocol, , Nebulization, Continuous RT, Stacy Roberts M.D.  •  albuterol inhaler 2 Puff, 2 Puff, Inhalation, Q4HRS PRN, Syl Fuentes M.D., 2 Puff at 08/12/19 0430  •  aspirin (ASA) chewable tab 81 mg, 81 mg, Enteral Tube, DAILY, Syl Fuentes M.D., 81 mg at 08/25/19 0437  •  Pharmacy Consult: Enteral tube insertion - review meds/change route/product selection, 1 Each, Other, PHARMACY TO DOSE, Messi Mann M.D.  •  ondansetron (ZOFRAN ODT) dispertab 4 mg, 4 mg, Enteral Tube, Q4HRS PRN, Messi Mann M.D.  •  atorvastatin (LIPITOR) tablet 40 mg, 40 mg, Enteral Tube, Nightly, Messi Mann M.D., 40 mg at 08/25/19 1739  •  levothyroxine (SYNTHROID) tablet 25 mcg, 25 mcg, Enteral Tube, AM ES, Messi Mann M.D., 25 mcg at 08/25/19 0437  •  donepezil (ARICEPT) tablet 10 mg, 10 mg, Enteral Tube, Q EVENING, Messi Mann M.D., 10 mg at 08/25/19 1739  •  labetalol (NORMODYNE,TRANDATE) injection 10 mg, 10 mg, Intravenous, Q4HRS PRN, Carlos Gay M.D.  •  enalaprilat (VASOTEC) injection 1.25 mg, 1.25 mg, Intravenous, Q6HRS PRN, Carlos Gay M.D.  •  ondansetron (ZOFRAN) syringe/vial injection 4 mg, 4 mg, Intravenous, Q4HRS PRN, Glory Mendiola M.D., 4 mg at 08/25/19 9787

## 2019-08-26 NOTE — PROGRESS NOTES
0930:  Assumed care of patient at 0700.  Report received at bedside.  Patient is A&O x 1, expected to discharge home with hospice in care of family.  Q2 turns and Q6 flushes in place.

## 2019-08-26 NOTE — CARE PLAN
Problem: Safety  Goal: Will remain free from falls  8/25/2019 2102 by Cristi Peterson R.N.  Outcome: PROGRESSING AS EXPECTED  8/25/2019 2052 by Cristi Peterson R.N.  Outcome: PROGRESSING AS EXPECTED  Bed locked in lowest position. Fall precautions in place.   Problem: Infection  Goal: Will remain free from infection  Outcome: PROGRESSING AS EXPECTED   Standard precautions in place. Hand hygiene performed.

## 2019-08-26 NOTE — CARE PLAN
Problem: Pain Management  Goal: Pain level will decrease to patient's comfort goal  Outcome: PROGRESSING AS EXPECTED  Note:   Scheduled tylenol working for headache.      Problem: Discharge Barriers/Planning  Goal: Patient's continuum of care needs will be met  Outcome: PROGRESSING SLOWER THAN EXPECTED  Intervention: Collaborate with Transitional Care Team and Interdisciplinary Team to meet discharge needs  Note:   Working with social work/ case management to get pt home with hospice.

## 2019-08-26 NOTE — CARE PLAN
Problem: Urinary Elimination:  Goal: Ability to reestablish a normal urinary elimination pattern will improve  Outcome: PROGRESSING SLOWER THAN EXPECTED     Problem: Mobility  Goal: Risk for activity intolerance will decrease  Outcome: PROGRESSING SLOWER THAN EXPECTED     Problem: Acute Care of the Stroke Patient  Goal: Optimal Outcome for the Stroke patient  Outcome: PROGRESSING SLOWER THAN EXPECTED

## 2019-08-26 NOTE — DISCHARGE PLANNING
Anticipated Discharge Disposition: Home with Hospice    Action: RICARDO Omer with [facility] ([phone]) to f/u with feeding tube conversation with pt's family.    RICARDO byrd pt's son, Mikael ([phone]). Mikael states that Negra was going to call him today regarding the feeding tube. Mikael stated that the room at home is ready for pt and he is just waiting to hear back from Negra with hospice.     Barriers to Discharge: None    Plan: LSW to assist as needed.

## 2019-08-27 PROCEDURE — A9270 NON-COVERED ITEM OR SERVICE: HCPCS | Performed by: HOSPITALIST

## 2019-08-27 PROCEDURE — A9270 NON-COVERED ITEM OR SERVICE: HCPCS | Performed by: NURSE PRACTITIONER

## 2019-08-27 PROCEDURE — 92526 ORAL FUNCTION THERAPY: CPT

## 2019-08-27 PROCEDURE — 700102 HCHG RX REV CODE 250 W/ 637 OVERRIDE(OP): Performed by: NURSE PRACTITIONER

## 2019-08-27 PROCEDURE — 700102 HCHG RX REV CODE 250 W/ 637 OVERRIDE(OP): Performed by: INTERNAL MEDICINE

## 2019-08-27 PROCEDURE — 700102 HCHG RX REV CODE 250 W/ 637 OVERRIDE(OP): Performed by: HOSPITALIST

## 2019-08-27 PROCEDURE — 770006 HCHG ROOM/CARE - MED/SURG/GYN SEMI*

## 2019-08-27 PROCEDURE — A9270 NON-COVERED ITEM OR SERVICE: HCPCS | Performed by: INTERNAL MEDICINE

## 2019-08-27 PROCEDURE — 99231 SBSQ HOSP IP/OBS SF/LOW 25: CPT | Performed by: INTERNAL MEDICINE

## 2019-08-27 RX ADMIN — LEVOTHYROXINE SODIUM 25 MCG: 25 TABLET ORAL at 06:05

## 2019-08-27 RX ADMIN — ATORVASTATIN CALCIUM 40 MG: 40 TABLET, FILM COATED ORAL at 21:20

## 2019-08-27 RX ADMIN — ACETAMINOPHEN 500 MG: 500 TABLET ORAL at 06:05

## 2019-08-27 RX ADMIN — ASPIRIN 81 MG 81 MG: 81 TABLET ORAL at 06:05

## 2019-08-27 RX ADMIN — ACETAMINOPHEN 500 MG: 500 TABLET ORAL at 17:21

## 2019-08-27 RX ADMIN — ACETAMINOPHEN 500 MG: 500 TABLET ORAL at 12:32

## 2019-08-27 RX ADMIN — DONEPEZIL HYDROCHLORIDE 10 MG: 5 TABLET, FILM COATED ORAL at 17:21

## 2019-08-27 NOTE — THERAPY
"Speech Language Therapy dysphagia treatment completed.   Functional Status:  Pt seen for dysphagia tx with PO trials of nectar thick liquids via 1/2 tsp and puree via 1/2 tsp. Pt was responsive but lethargic, fatiguing after 20 minutes of therapy. Secretions and oral residue pooled in anterior portion of oral cavity with anterior spillage on L noted intermittently. Pt wiped mouth after every bolus presentation, but only on R side, continuing to demo' L inattention/neglect. Mod verbal/tactile cues effective for pt to attend to L side to wipe mouth and perform lingual sweeps to clear oral residue after each bolus. Throat clearing noted with nectar thick liquids, along with increasing wet/gurgly vocal quality with both NTL and puree. Pt is still not at the level for a PO diet at this time. Recommend continuing NPO/TF. If pt does go home on hospice and pt wishes to eat by mouth despite risk, would recommend pleasure feeds of purees and nectar thick liquids by 1/2 tsp.     Recommendations: Continue NPO/TF for alternative means of nutrition/hydration. If pt discharges on hospice and wishes to eat despite risk, consider removal of Cortrak and pleasure feeds of purees and nectar thick liquids by 1/2 tsp.   Plan of Care: Will benefit from Speech Therapy 3 times per week  Post-Acute Therapy: If pt discharges home on hospice, recommend home health transitional care services for continued speech therapy services/caregiver training.      See \"Rehab Therapy-Acute\" Patient Summary Report for complete documentation.     "

## 2019-08-27 NOTE — THERAPY
Per chart review and RN, plan is to DChome with Son and hospice. RN reports pt is not following commands to actively participate with care. PT will not formally follow; however,  will remain available to address DC needs and caregiver training as requested.    Crissy Daniels, PT, DPT Pager: [phone]

## 2019-08-27 NOTE — PROGRESS NOTES
Hospital Medicine Daily Progress Note    Date of Service  8/27/2019    Chief Complaint  Left-sided weakness and vision changes    Hospital Course   This is an 88-year-old female with PMH significant for dementia, CAD and frequent falls who presented 8/15/2019 with GLF and left hemineglect.  Initial head CT on admission showed no acute findings.  MRI showed moderate-sized acute right MCA infarct with hemorrhagic transformation and she underwent thrombectomy.Inbitially she was monitored in ICU.  Core track was placed. some of encephalopathy had improved.  Also during this hospital stay she was treated for cystitis with ceftriaxone.  Also incidental finding of tonsillar enlargement and on the CT scan which she will need outpatient follow-up depends on her recovery.  Palliative care was consulted to discuss goals of care with family specifically her son.  Plan discharge home on hospice.       Interval Problem Update    8/20. C diff (-).  Patient overnight remained comfortable.  Currently pending hospice to accept patient.  Pending placement.  Family agree with hospice and interested.   to continue outpatient with DC plan.  Patient complains of general body achiness. Patient's pain is general 2-3/10, intermittent and does not radiate to other location, sharp and with some tingling. Can be controlled by pain meds.   8/21.  Patient is relatively comfortable today.  Still awaiting for discharge plan for outpatient hospice.  Patient's family is still actively seeking placement for hospice. Patient otherwise denies fever, chills, nausea, vomiting, adb pain, SOB, CP, headache, constipation, diarrhea, cough, or sputum.  8/22.  Patient is still very lethargic.  Awaiting for placement for hospice.  Continue work with  and family for safe discharge plan.  Patient complains of general body achiness. Patient's pain is general 1-2/10, intermittent and does not radiate to other location, sharp and with some  tingling. Can be controlled by pain meds.   8/23.  Patient remained stable still pending hospice to set up. Patient otherwise denies fever, chills, nausea, vomiting, adb pain, SOB, CP, headache, constipation, diarrhea, cough, or sputum.  08/24- lethargic. Pending hospice placement   08/25-continues to be lethargic.  Alert and oriented x2.  Pending hospice placement.  08/26-continues to be lethargic.  Continue to be on tube feeds. Does not follow commands. Hospice   08/27- no changed. No event. Pt continues to be lethargic. Plan hospice at home. instruction to son provided in regards to feeding, hospice care     Consultants/Specialty  -Pulmonary/Crticial Care - signed off  -Palliative Care  -Hospice  -Neurology     Code Status  DNAR - Intubation OK    Disposition  Either to a facility or home with Hospice.    Review of Systems  Review of Systems   Unable to perform ROS: Mental acuity (Limited )        Physical Exam  Temp:  [36.3 °C (97.4 °F)-36.8 °C (98.3 °F)] 36.8 °C (98.3 °F)  Pulse:  [86-96] 95  Resp:  [16-18] 16  BP: (112-130)/(57-72) 112/57  SpO2:  [92 %-97 %] 93 %    Physical Exam   Constitutional: Vital signs are normal. She appears lethargic. She is sleeping and cooperative. She is easily aroused. She has a sickly appearance. No distress.   HENT:   Right Ear: Hearing and external ear normal.   Left Ear: Hearing and external ear normal.   Nose: Nose normal.   Mouth/Throat: Oropharynx is clear and moist. Mucous membranes are dry and not cyanotic. No oropharyngeal exudate.   Cortrack left nare.   Eyes: Conjunctivae are normal. Left eye exhibits no discharge. Left eye exhibits abnormal extraocular motion.   Neck: Neck supple. No tracheal deviation present. No thyromegaly present.   Cardiovascular: Normal rate, regular rhythm and intact distal pulses. Exam reveals no gallop.   Murmur heard.  No edema   Pulmonary/Chest: Effort normal. No respiratory distress. She has decreased breath sounds. She exhibits no  tenderness.   Abdominal: Soft. Normal appearance and bowel sounds are normal. She exhibits no distension. There is no rigidity, no rebound and no guarding.   Musculoskeletal: Normal range of motion. She exhibits no tenderness.   Left-sided flaccidity   Neurological: She is easily aroused. She appears lethargic. She displays atrophy. She exhibits normal muscle tone. She displays no seizure activity. GCS eye subscore is 3. GCS verbal subscore is 2. GCS motor subscore is 5.   Not cooperating      Skin: Skin is warm and dry. No rash noted. She is not diaphoretic.   Psychiatric: Her behavior is normal. Her speech is delayed. She exhibits abnormal remote memory.   Nursing note and vitals reviewed.      Fluids    Intake/Output Summary (Last 24 hours) at 8/27/2019 1513  Last data filed at 8/26/2019 1900  Gross per 24 hour   Intake 120 ml   Output 550 ml   Net -430 ml       Laboratory  Recent Labs     08/25/19  0540   WBC 12.3*   RBC 3.78*   HEMOGLOBIN 11.3*   HEMATOCRIT 36.0*   MCV 95.2   MCH 29.9   MCHC 31.4*   RDW 45.4   PLATELETCT 335   MPV 9.2     Recent Labs     08/25/19  0540   SODIUM 132*   POTASSIUM 4.4   CHLORIDE 98   CO2 29   GLUCOSE 105*   BUN 31*   CREATININE 0.67   CALCIUM 8.6                   Imaging  [id number] VIEW   Final Result         1.  Nonspecific bowel gas pattern.   2.  Dobbhoff tube with tip terminating overlying the expected location of the first or second duodenal segment.      [id number] VIEW   Final Result         1.  Nonspecific bowel gas pattern.   2.  Dobbhoff tube tip terminates overlying the expected location of the gastric antrum.      CT-HEAD W/O   Final Result      1.  Acute right MCA infarcts with hemorrhagic transformation seen in the right temporal lobe. No significant change from prior brain MRI given differences in technique.   2.  Atrophy and chronic microvascular ischemic type changes.      EC-ECHOCARDIOGRAM COMPLETE W/O CONT   Final Result      [id number] VIEW   Final  Result         Feeding tube with tip projecting over the expected area of the distal stomach.      DX-ABDOMEN FOR TUBE PLACEMENT   Final Result      Feeding tube tip projects over the distal stomach or duodenal bulb.      DX-ABDOMEN FOR TUBE PLACEMENT   Final Result      1.  Feeding tube tip projects near the GE junction.      MR-BRAIN-W/O   Final Result      1.  Moderate-sized acute right MCA territory infarct with hemorrhagic transformation.   2.  Moderate diffuse cervical substance loss.   3.  Advanced microangiopathic ischemic change versus demyelination or gliosis.   4.  Chronic ischemic pontine gliosis.   5.  Findings of pansinusitis.      These findings were discussed with MORIS CANTU on 8/9/2019 2:50 PM. These findings were discussed with Dr. Mahendra Mann for Dr. MORIS CANTU on 8/9/2019 2:56 PM.      DX-CHEST-PORTABLE (1 VIEW)   Final Result      1.  No acute cardiopulmonary disease.   2.  Contrast present within mildly dilated LEFT renal collecting system.      IR-THROMBO MECHANICAL ARTERY,INIT   Final Result      1.  Occlusion of the right middle cerebral artery, M2 segment.      2.  Successful cerebral thrombectomy performed with post intervention angiogram demonstrating patent right middle cerebral artery vessels.      Findings were discussed with Dr. Miramontes at approximately 1755 hrs.      OUTSIDE IMAGES-CT HEAD   Final Result      OUTSIDE IMAGES-CT HEAD   Final Result      CT-CTA NECK WITH & W/O-POST PROCESSING   Final Result      Moderate calcified plaque right carotid artery bifurcation. No significant stenosis.   Left carotid arteries appear patent.   Vertebral arteries appear patent.   Focal enlargement of right lingual tonsils measuring 12.6 mm in diameter. Patient's condition permits, would consider further evaluation.      CT-CEREBRAL PERFUSION ANALYSIS   Final Result      1.  Cerebral blood flow less than 30% likely representing completed infarct = 38 mL.      2.  T Max more than 6 seconds  likely representing combination of completed infarct and ischemia = 98 mL.      3.  Mismatched volume likely representing ischemic brain/penumbra = 60 mL      4.  Please note that the cerebral perfusion was performed on the limited brain tissue around the basal ganglia region. Infarct/ischemia outside the CT perfusion sections can be missed in this study.      CT-CTA HEAD WITH & W/O-POST PROCESS    (Results Pending)        Assessment/Plan    * CVA (cerebral vascular accident) (HCC)- (present on admission)  Assessment & Plan  -Status post thrombectomy  -MRI with hemorrhagic transformation   -dysphagia requiring Cortrack for tube feedings.   -expressive aphasia  -left-sided neglect and extremity flaccidity  -c/o headaches today  -Palliative Care recommended Hospice consult  -ASA and statin  -CM/SS assisting with placement/Hospice/Family  Patient family agree with hospice          Dysphagia  Assessment & Plan  -due to stroke  -SLP following  -NPO with Cortrack with tube feedings  -Aspiration Precautions     Tonsillar enlargement- (present on admission)  Assessment & Plan  -Incidental finding on CT  -Will need further work-up depending on clinical recovery    Anemia- (present on admission)  Assessment & Plan  -chronic and stable at her baseline      Acute encephalopathy- (present on admission)  Assessment & Plan  -likely multi-factorial - acute cystitis (s/p treatment), CVA, worsening baseline dementia, hospitalization  -arouses to voice today. Answers orientation questions correctly.   -no behavioral outbursts  -frequent reorientation, sleep hygiene  -Fall Precautions  -avoid benzos/narcotics  Patient family agree with hospice    DNR (do not resuscitate)- (present on admission)  Assessment & Plan  -son would still want patient intubated. Ongoing education & counseling  -Hospice        CAD (coronary artery disease)- (present on admission)  Assessment & Plan  -continue ASA and statin      Falls frequently- (present on  admission)  Assessment & Plan  -now s/p CVA with left-sided flaccidity  -plan to DC on Hospice  -Fall Precautions      Dementia- (present on admission)  Assessment & Plan  -difficult to ascertain her baseline  -continue Aricept  -frequent reorientation  -Fall precautions  -Plan to DC to home or facility on hospice          Patient require safe discharge plan with hospice to go to a facility because patient's family cannot take care of patient at home with hospice.     VTE prophylaxis: SCD    Abraham Liu M.D.      Current Facility-Administered Medications:   •  acetaminophen (TYLENOL) tablet 500 mg, 500 mg, Enteral Tube, Q6HRS, Kalyn Leroy, A.P.R.N., 500 mg at 08/27/19 1232  •  ipratropium-albuterol (DUONEB) nebulizer solution, 3 mL, Nebulization, Q4H PRN (RT), Stacy Roberts M.D.  •  Respiratory Care per Protocol, , Nebulization, Continuous RT, Stacy Roberts M.D.  •  albuterol inhaler 2 Puff, 2 Puff, Inhalation, Q4HRS PRN, Syl Fuentes M.D., 2 Puff at 08/12/19 0430  •  aspirin (ASA) chewable tab 81 mg, 81 mg, Enteral Tube, DAILY, Syl Fuentes M.D., 81 mg at 08/27/19 0605  •  Pharmacy Consult: Enteral tube insertion - review meds/change route/product selection, 1 Each, Other, PHARMACY TO DOSE, Messi Mann M.D.  •  ondansetron (ZOFRAN ODT) dispertab 4 mg, 4 mg, Enteral Tube, Q4HRS PRN, Messi Mann M.D.  •  atorvastatin (LIPITOR) tablet 40 mg, 40 mg, Enteral Tube, Nightly, Messi Mann M.D., 40 mg at 08/26/19 2200  •  levothyroxine (SYNTHROID) tablet 25 mcg, 25 mcg, Enteral Tube, AM ES, Messi Mann M.D., 25 mcg at 08/27/19 0605  •  donepezil (ARICEPT) tablet 10 mg, 10 mg, Enteral Tube, Q EVENING, Messi Mann M.D., 10 mg at 08/26/19 1712  •  labetalol (NORMODYNE,TRANDATE) injection 10 mg, 10 mg, Intravenous, Q4HRS PRN, Carlos Gay M.D.  •  enalaprilat (VASOTEC) injection 1.25 mg, 1.25 mg, Intravenous, Q6HRS PRN, Carlos Gay M.D.  •  ondansetron  (ZOFRAN) syringe/vial injection 4 mg, 4 mg, Intravenous, Q4HRS PRN, Glory Mendiola M.D., 4 mg at 08/25/19 1329

## 2019-08-27 NOTE — DISCHARGE PLANNING
Anticipated Discharge Disposition: Home with Hospice    Action: LSW tc Negra with [facility] ([phone]) and LSW informed that hospice can accommodate pt tomorrow.   LSW tc pt's son, Mikael to confirm address where pt will be placed ([address] [place] [id number])   LSW faxed [facility]icia [facility] transport and [facility] form requesting for 8/28/2019 @1000.     Barriers to Discharge: None    Plan: LSW f/u with Mikael and Negra with transport time.       Addendum 1218  LSW informed by Felicia EST[facility]ES that pt transport setup for 1000 on 8/28/2019   Negra with [facility] aware. Pt's son, Mikael aware. LSW to inform  during rounds.

## 2019-08-27 NOTE — PROGRESS NOTES
2 RN Skin Check complete with OM[facility] Liriano.     Skin assessed under devices; intact.  Confirmed pressure ulcers found on; n/a  Sacrum pink and blanching, skin tear L hand, scab top of L foot, excoriation in perineal area from wiping with rags.  The following interventions put in place; pillow, draw sheet, .paste, q2hr turns, ROM, heel off loading boots

## 2019-08-27 NOTE — PROGRESS NOTES
1100:  Assumed care of patient at 0700.  Report received at bedside.  Patient is A&O x 3 this AM, disoriented to event.  Speech is currently working with the patient.  Hopeful to discharge home today.  Awaiting response from [facility].

## 2019-08-27 NOTE — CARE PLAN
Problem: Pain Management  Goal: Pain level will decrease to patient's comfort goal  Outcome: PROGRESSING AS EXPECTED     Problem: Skin Integrity  Goal: Risk for impaired skin integrity will decrease  Outcome: PROGRESSING SLOWER THAN EXPECTED

## 2019-08-27 NOTE — PROGRESS NOTES
Hospital Medicine Daily Progress Note    Date of Service  8/26/2019    Chief Complaint  Left-sided weakness and vision changes    Hospital Course   This is an 88-year-old female with PMH significant for dementia, CAD and frequent falls who presented 8/15/2019 with GLF and left hemineglect.  Initial head CT on admission showed no acute findings.  MRI showed moderate-sized acute right MCA infarct with hemorrhagic transformation and she underwent thrombectomy.Inbitially she was monitored in ICU.  Core track was placed. some of encephalopathy had improved.  Also during this hospital stay she was treated for cystitis with ceftriaxone.  Also incidental finding of tonsillar enlargement and on the CT scan which she will need outpatient follow-up depends on her recovery.  Palliative care was consulted to discuss goals of care with family specifically her son.  Plan discharge home on hospice.       Interval Problem Update    8/20. C diff (-).  Patient overnight remained comfortable.  Currently pending hospice to accept patient.  Pending placement.  Family agree with hospice and interested.   to continue outpatient with DC plan.  Patient complains of general body achiness. Patient's pain is general 2-3/10, intermittent and does not radiate to other location, sharp and with some tingling. Can be controlled by pain meds.   8/21.  Patient is relatively comfortable today.  Still awaiting for discharge plan for outpatient hospice.  Patient's family is still actively seeking placement for hospice. Patient otherwise denies fever, chills, nausea, vomiting, adb pain, SOB, CP, headache, constipation, diarrhea, cough, or sputum.  8/22.  Patient is still very lethargic.  Awaiting for placement for hospice.  Continue work with  and family for safe discharge plan.  Patient complains of general body achiness. Patient's pain is general 1-2/10, intermittent and does not radiate to other location, sharp and with some  tingling. Can be controlled by pain meds.   8/23.  Patient remained stable still pending hospice to set up. Patient otherwise denies fever, chills, nausea, vomiting, adb pain, SOB, CP, headache, constipation, diarrhea, cough, or sputum.  08/24- lethargic. Pending hospice placement   08/25-continues to be lethargic.  Alert and oriented x2.  Pending hospice placement.  08/26-continues to be lethargic.  Continue to be on tube feeds. Does not follow commands. Hospice     Consultants/Specialty  -Pulmonary/Crticial Care - signed off  -Palliative Care  -Hospice  -Neurology     Code Status  DNAR - Intubation OK    Disposition  Either to a facility or home with Hospice.    Review of Systems  Review of Systems   Unable to perform ROS: Mental acuity (Limited )        Physical Exam  Temp:  [36.6 °C (97.9 °F)-36.7 °C (98 °F)] 36.7 °C (98 °F)  Pulse:  [86-92] 86  Resp:  [16-20] 16  BP: (111-128)/(48-99) 125/63  SpO2:  [90 %-93 %] 93 %    Physical Exam   Constitutional: Vital signs are normal. She appears lethargic. She is sleeping and cooperative. She is easily aroused. She has a sickly appearance. No distress.   HENT:   Right Ear: Hearing and external ear normal.   Left Ear: Hearing and external ear normal.   Nose: Nose normal.   Mouth/Throat: Oropharynx is clear and moist. Mucous membranes are dry and not cyanotic. No oropharyngeal exudate.   Cortrack left nare.   Eyes: Conjunctivae are normal. Left eye exhibits no discharge. Left eye exhibits abnormal extraocular motion.   Neck: Neck supple. No tracheal deviation present. No thyromegaly present.   Cardiovascular: Normal rate, regular rhythm and intact distal pulses. Exam reveals no gallop.   Murmur heard.  No edema   Pulmonary/Chest: Effort normal. No respiratory distress. She has decreased breath sounds. She exhibits no tenderness.   Abdominal: Soft. Normal appearance and bowel sounds are normal. She exhibits no distension. There is no rigidity, no rebound and no guarding.    Musculoskeletal: Normal range of motion. She exhibits no tenderness.   Left-sided flaccidity   Neurological: She is easily aroused. She appears lethargic. She displays atrophy. She exhibits normal muscle tone. She displays no seizure activity. GCS eye subscore is 3. GCS verbal subscore is 2. GCS motor subscore is 5.   Not cooperating      Skin: Skin is warm and dry. No rash noted. She is not diaphoretic.   Psychiatric: Her behavior is normal. Her speech is delayed. She exhibits abnormal remote memory.   Nursing note and vitals reviewed.      Fluids    Intake/Output Summary (Last 24 hours) at 8/26/2019 1828  Last data filed at 8/26/2019 1800  Gross per 24 hour   Intake 1170 ml   Output 1050 ml   Net 120 ml       Laboratory  Recent Labs     08/25/19  0540   WBC 12.3*   RBC 3.78*   HEMOGLOBIN 11.3*   HEMATOCRIT 36.0*   MCV 95.2   MCH 29.9   MCHC 31.4*   RDW 45.4   PLATELETCT 335   MPV 9.2     Recent Labs     08/25/19  0540   SODIUM 132*   POTASSIUM 4.4   CHLORIDE 98   CO2 29   GLUCOSE 105*   BUN 31*   CREATININE 0.67   CALCIUM 8.6                   Imaging  [id number] VIEW   Final Result         1.  Nonspecific bowel gas pattern.   2.  Dobbhoff tube with tip terminating overlying the expected location of the first or second duodenal segment.      [id number] VIEW   Final Result         1.  Nonspecific bowel gas pattern.   2.  Dobbhoff tube tip terminates overlying the expected location of the gastric antrum.      CT-HEAD W/O   Final Result      1.  Acute right MCA infarcts with hemorrhagic transformation seen in the right temporal lobe. No significant change from prior brain MRI given differences in technique.   2.  Atrophy and chronic microvascular ischemic type changes.      EC-ECHOCARDIOGRAM COMPLETE W/O CONT   Final Result      [id number] VIEW   Final Result         Feeding tube with tip projecting over the expected area of the distal stomach.      DX-ABDOMEN FOR TUBE PLACEMENT   Final Result      Feeding  tube tip projects over the distal stomach or duodenal bulb.      DX-ABDOMEN FOR TUBE PLACEMENT   Final Result      1.  Feeding tube tip projects near the GE junction.      MR-BRAIN-W/O   Final Result      1.  Moderate-sized acute right MCA territory infarct with hemorrhagic transformation.   2.  Moderate diffuse cervical substance loss.   3.  Advanced microangiopathic ischemic change versus demyelination or gliosis.   4.  Chronic ischemic pontine gliosis.   5.  Findings of pansinusitis.      These findings were discussed with MORIS CANTU on 8/9/2019 2:50 PM. These findings were discussed with Dr. Mahendra Mann for Dr. MORIS CANTU on 8/9/2019 2:56 PM.      DX-CHEST-PORTABLE (1 VIEW)   Final Result      1.  No acute cardiopulmonary disease.   2.  Contrast present within mildly dilated LEFT renal collecting system.      IR-THROMBO MECHANICAL ARTERY,INIT   Final Result      1.  Occlusion of the right middle cerebral artery, M2 segment.      2.  Successful cerebral thrombectomy performed with post intervention angiogram demonstrating patent right middle cerebral artery vessels.      Findings were discussed with Dr. Miramontes at approximately 1755 hrs.      OUTSIDE IMAGES-CT HEAD   Final Result      OUTSIDE IMAGES-CT HEAD   Final Result      CT-CTA NECK WITH & W/O-POST PROCESSING   Final Result      Moderate calcified plaque right carotid artery bifurcation. No significant stenosis.   Left carotid arteries appear patent.   Vertebral arteries appear patent.   Focal enlargement of right lingual tonsils measuring 12.6 mm in diameter. Patient's condition permits, would consider further evaluation.      CT-CEREBRAL PERFUSION ANALYSIS   Final Result      1.  Cerebral blood flow less than 30% likely representing completed infarct = 38 mL.      2.  T Max more than 6 seconds likely representing combination of completed infarct and ischemia = 98 mL.      3.  Mismatched volume likely representing ischemic brain/penumbra = 60 mL       4.  Please note that the cerebral perfusion was performed on the limited brain tissue around the basal ganglia region. Infarct/ischemia outside the CT perfusion sections can be missed in this study.      CT-CTA HEAD WITH & W/O-POST PROCESS    (Results Pending)        Assessment/Plan    * CVA (cerebral vascular accident) (HCC)- (present on admission)  Assessment & Plan  -Status post thrombectomy  -MRI with hemorrhagic transformation   -dysphagia requiring Cortrack for tube feedings.   -expressive aphasia  -left-sided neglect and extremity flaccidity  -c/o headaches today  -Palliative Care recommended Hospice consult  -ASA and statin  -CM/SS assisting with placement/Hospice/Family  Patient family agree with hospice          Dysphagia  Assessment & Plan  -due to stroke  -SLP following  -NPO with Cortrack with tube feedings  -Aspiration Precautions     Tonsillar enlargement- (present on admission)  Assessment & Plan  -Incidental finding on CT  -Will need further work-up depending on clinical recovery    Anemia- (present on admission)  Assessment & Plan  -chronic and stable at her baseline      Acute encephalopathy- (present on admission)  Assessment & Plan  -likely multi-factorial - acute cystitis (s/p treatment), CVA, worsening baseline dementia, hospitalization  -arouses to voice today. Answers orientation questions correctly.   -no behavioral outbursts  -frequent reorientation, sleep hygiene  -Fall Precautions  -avoid benzos/narcotics  Patient family agree with hospice    DNR (do not resuscitate)- (present on admission)  Assessment & Plan  -son would still want patient intubated. Ongoing education & counseling  -Hospice        CAD (coronary artery disease)- (present on admission)  Assessment & Plan  -continue ASA and statin      Falls frequently- (present on admission)  Assessment & Plan  -now s/p CVA with left-sided flaccidity  -plan to DC on Hospice  -Fall Precautions      Dementia- (present on  admission)  Assessment & Plan  -difficult to ascertain her baseline  -continue Aricept  -frequent reorientation  -Fall precautions  -Plan to DC to home or facility on hospice          Patient require safe discharge plan with hospice to go to a facility because patient's family cannot take care of patient at home with hospice.     VTE prophylaxis: SCD    Abraham Liu M.D.      Current Facility-Administered Medications:   •  acetaminophen (TYLENOL) tablet 500 mg, 500 mg, Enteral Tube, Q6HRS, Kalyn Leroy, A.P.R.N., 500 mg at 08/26/19 1712  •  ipratropium-albuterol (DUONEB) nebulizer solution, 3 mL, Nebulization, Q4H PRN (RT), Stacy Roberts M.D.  •  Respiratory Care per Protocol, , Nebulization, Continuous RT, Stacy Roberts M.D.  •  albuterol inhaler 2 Puff, 2 Puff, Inhalation, Q4HRS PRN, Syl Fuentes M.D., 2 Puff at 08/12/19 0430  •  aspirin (ASA) chewable tab 81 mg, 81 mg, Enteral Tube, DAILY, Syl Fuentes M.D., 81 mg at 08/26/19 0505  •  Pharmacy Consult: Enteral tube insertion - review meds/change route/product selection, 1 Each, Other, PHARMACY TO DOSE, Messi Mann M.D.  •  ondansetron (ZOFRAN ODT) dispertab 4 mg, 4 mg, Enteral Tube, Q4HRS PRN, Messi Mann M.D.  •  atorvastatin (LIPITOR) tablet 40 mg, 40 mg, Enteral Tube, Nightly, Messi Mann M.D., 40 mg at 08/25/19 1739  •  levothyroxine (SYNTHROID) tablet 25 mcg, 25 mcg, Enteral Tube, AM ES, Messi Mann M.D., 25 mcg at 08/26/19 0505  •  donepezil (ARICEPT) tablet 10 mg, 10 mg, Enteral Tube, Q EVENING, Messi Mann M.D., 10 mg at 08/26/19 1712  •  labetalol (NORMODYNE,TRANDATE) injection 10 mg, 10 mg, Intravenous, Q4HRS PRN, Carlos Gay M.D.  •  enalaprilat (VASOTEC) injection 1.25 mg, 1.25 mg, Intravenous, Q6HRS PRN, Carlos Gay M.D.  •  ondansetron (ZOFRAN) syringe/vial injection 4 mg, 4 mg, Intravenous, Q4HRS PRN, Glory Mendiola M.D., 4 mg at 08/25/19 8557

## 2019-08-27 NOTE — DISCHARGE PLANNING
Received Transport Form @ 3585  Spoke to Vinnie LAYNE    Transport is scheduled for 8/28 @ 1000 going home with [facility]. Brenda([facility]) and Negra([facility]) notified of transport time.

## 2019-08-28 VITALS
SYSTOLIC BLOOD PRESSURE: 120 MMHG | OXYGEN SATURATION: 96 % | RESPIRATION RATE: 16 BRPM | TEMPERATURE: 97.1 F | HEART RATE: 88 BPM | WEIGHT: 132.06 LBS | BODY MASS INDEX: 25.93 KG/M2 | HEIGHT: 60 IN | DIASTOLIC BLOOD PRESSURE: 71 MMHG

## 2019-08-28 PROBLEM — G45.9 TIA (TRANSIENT ISCHEMIC ATTACK): Chronic | Status: RESOLVED | Noted: 2017-01-30 | Resolved: 2019-08-28

## 2019-08-28 PROBLEM — Z57.1 OCCUPATIONAL EXPOSURE TO RADIATION: Chronic | Status: RESOLVED | Noted: 2018-04-02 | Resolved: 2019-08-28

## 2019-08-28 PROBLEM — R40.0 DAYTIME SOMNOLENCE: Status: RESOLVED | Noted: 2018-11-09 | Resolved: 2019-08-28

## 2019-08-28 PROBLEM — Z23 NEED FOR STREPTOCOCCUS PNEUMONIAE VACCINATION: Chronic | Status: RESOLVED | Noted: 2017-01-30 | Resolved: 2019-08-28

## 2019-08-28 PROCEDURE — 700102 HCHG RX REV CODE 250 W/ 637 OVERRIDE(OP): Performed by: HOSPITALIST

## 2019-08-28 PROCEDURE — A9270 NON-COVERED ITEM OR SERVICE: HCPCS | Performed by: INTERNAL MEDICINE

## 2019-08-28 PROCEDURE — 99239 HOSP IP/OBS DSCHRG MGMT >30: CPT | Performed by: INTERNAL MEDICINE

## 2019-08-28 PROCEDURE — A9270 NON-COVERED ITEM OR SERVICE: HCPCS | Performed by: HOSPITALIST

## 2019-08-28 PROCEDURE — A9270 NON-COVERED ITEM OR SERVICE: HCPCS | Performed by: NURSE PRACTITIONER

## 2019-08-28 PROCEDURE — 700102 HCHG RX REV CODE 250 W/ 637 OVERRIDE(OP): Performed by: INTERNAL MEDICINE

## 2019-08-28 PROCEDURE — 700102 HCHG RX REV CODE 250 W/ 637 OVERRIDE(OP): Performed by: NURSE PRACTITIONER

## 2019-08-28 RX ADMIN — ACETAMINOPHEN 500 MG: 500 TABLET ORAL at 00:53

## 2019-08-28 RX ADMIN — ACETAMINOPHEN 500 MG: 500 TABLET ORAL at 05:31

## 2019-08-28 RX ADMIN — LEVOTHYROXINE SODIUM 25 MCG: 25 TABLET ORAL at 05:31

## 2019-08-28 RX ADMIN — ASPIRIN 81 MG 81 MG: 81 TABLET ORAL at 05:31

## 2019-08-28 NOTE — PROGRESS NOTES
1015:  Patient discharged home with [facility].  NG tube and IV removed.  Appropriate paperwork placed on chart.

## 2019-08-28 NOTE — DISCHARGE INSTRUCTIONS
Discharge Instructions per Abraham Liu M.D.    Follow up with hospice care at home   Supportive comfort care     DIET: supportive comfort dysphagia diet     ACTIVITY: as tolerated     DIAGNOSIS: cerebral vascular accident             Discharge Instructions    Discharged to home by ambulance with escort. Discharged via ambulance, hospital escort: Yes.  Special equipment needed: Not Applicable    Be sure to schedule a follow-up appointment with your primary care doctor or any specialists as instructed.     Discharge Plan:   Diet Plan: Discussed  Activity Level: Discussed  Confirmed Follow up Appointment: Appointment Scheduled  Confirmed Symptoms Management: Discussed  Medication Reconciliation Updated: Yes  Influenza Vaccine Indication: Patient Refuses    I understand that a diet low in cholesterol, fat, and sodium is recommended for good health. Unless I have been given specific instructions below for another diet, I accept this instruction as my diet prescription.   Other diet: NPO/ NG tube    Special Instructions:     Stroke/CVA/TIA/Hemorrhagic Ischemia Discharge Instructions  You have had a stroke. Your risk factors have been identified as follows:  Age - Over 55  High blood pressure  It is important that you reduce your risk factors to avoid another stroke in the future. Here are some general guidelines to follow:  · Eat healthy - avoid food high in fat.  · Get regular exercise.  · Maintain a healthy weight.  · Avoid smoking.  · Avoid alcohol and illegal drug use.  · Take your medications as directed.  For more information regarding risk factors, refer to pages 17-19 in your Stroke Patient Education Guide. Stroke Education Guide was given to patient and family member.    Warning signs of a stroke include (which can also be found on page 3 of your Stroke Patient Education Guide):  · Sudden numbness of weakness of the face, arm or leg (especially on one side of the body).  · Sudden confusion, trouble speaking or  understanding.  · Sudden trouble seeing in one or both eyes.  · Sudden trouble walking, dizziness, loss of balance or coordination.  · Sudden severe headache with no known cause.  It is very important to get treatment quickly when a stroke occurs. If you experience any of the above warning signs, call [phone] immediately.     Some patients who have had a stroke will be going home on a blood thinner medication called Warfarin (Coumadin).  This medication requires very close monitoring and follow up.  This follow up can be provided by either your Primary Care Physician or by [facility] Outpatient Anticoagulation Service.  The Outpatient Anticoagulation Service is located at the [facility] at [facility] ([facility]).  If you do not know when your follow up appointment is scheduled, call [phone] to verify your appointment time.      · Is patient discharged on Warfarin / Coumadin?   No     Depression / Suicide Risk    As you are discharged from this [facility], it is important to learn how to keep safe from harming yourself.    Recognize the warning signs:  · Abrupt changes in personality, positive or negative- including increase in energy   · Giving away possessions  · Change in eating patterns- significant weight changes-  positive or negative  · Change in sleeping patterns- unable to sleep or sleeping all the time   · Unwillingness or inability to communicate  · Depression  · Unusual sadness, discouragement and loneliness  · Talk of wanting to die  · Neglect of personal appearance   · Rebelliousness- reckless behavior  · Withdrawal from people/activities they love  · Confusion- inability to concentrate     If you or a loved one observes any of these behaviors or has concerns about self-harm, here's what you can do:  · Talk about it- your feelings and reasons for harming yourself  · Remove any means that you might use to hurt yourself (examples: pills,  rope, extension cords, firearm)  · Get professional help from the community (Mental Health, Substance Abuse, psychological counseling)  · Do not be alone:Call your Safe Contact- someone whom you trust who will be there for you.  · Call your local CRISIS HOTLINE 096-7609 or 646-541-2140  · Call your local Children's Mobile Crisis Response Team [place] (880) 112-2965 or www.ASP64  · Call the toll free National Suicide Prevention Hotlines   · National Suicide Prevention Lifeline 471-486-OFSI (3102)  · National Hope Line Network 800-SUICIDE (319-5423)

## 2019-08-28 NOTE — PROGRESS NOTES
2RN skin: incontinence associated dermatitis noted on groin and buttocks, moisture cream applied. Skin tear on left hand, dressing in place CDI. Scab on dorsal part of left foot.

## 2019-08-28 NOTE — DISCHARGE SUMMARY
Discharge Summary    CHIEF COMPLAINT ON ADMISSION  Chief Complaint   Patient presents with   • Possible Stroke     Last known well 1030PM on 8/7/19       Reason for Admission  EMS B=15     Admission Date  8/8/2019    CODE STATUS  DNAR, MARILYN OK    HPI & HOSPITAL COURSE    This is an 88-year-old female with PMH significant for dementia, CAD and frequent falls who presented 8/15/2019 with GLF and left hemineglect.  Initial head CT on admission showed no acute findings.  MRI showed moderate-sized acute right MCA infarct with hemorrhagic transformation and she underwent thrombectomy.Inbitially she was monitored in ICU.  Core track was placed. some of encephalopathy had improved.  Also during this hospital stay she was treated for cystitis with ceftriaxone.  Also incidental finding of tonsillar enlargement and on the CT scan which she will need outpatient follow-up depends on her recovery.  Palliative care was consulted to discuss goals of care with family specifically her son. His son and family decided home with hospice for supportive comfort care. Home hospice arranged. NG tube discontinued.      Therefore, she is discharged in fair and stable condition to hospice.    The patient met 2-midnight criteria for an inpatient stay at the time of discharge.    Discharge Date  08/28/2019    FOLLOW UP ITEMS POST DISCHARGE  None     DISCHARGE DIAGNOSES  Principal Problem:    CVA (cerebral vascular accident) (HCC) POA: Yes  Active Problems:    Dementia (Chronic) POA: Yes      Overview: MMSE 17/30 (5/15/2012)    Falls frequently (Chronic) POA: Yes      Overview: ambulates with cane, power chair 5/2012    CAD (coronary artery disease) (Chronic) POA: Yes    DNR (do not resuscitate) POA: Yes    Acute encephalopathy POA: Yes    Anemia POA: Yes    Tonsillar enlargement POA: Yes    Dysphagia POA: No  Resolved Problems:    Leukocytosis POA: Unknown    Hyponatremia POA: Yes    Acute cystitis without hematuria POA: Yes    Electrolyte  abnormality POA: Unknown    Diarrhea of presumed infectious origin POA: No      FOLLOW UP  No future appointments.  No follow-up provider specified.    MEDICATIONS ON DISCHARGE     Medication List      CONTINUE taking these medications      Instructions   Albuterol Sulfate 108 (90 Base) MCG/ACT Aepb   Doctor's comments:  This is a rescue inhaler--please remind her son since he was having her do daily when she was not SOB  Inhale 2 Puffs by mouth every 6 hours as needed.  Dose:  2 Puff     levothyroxine 25 MCG Tabs  Commonly known as:  SYNTHROID   Take 25 mcg by mouth Every morning on an empty stomach.  Dose:  25 mcg     LIDODERM 5 % Ptch  Generic drug:  lidocaine   Apply 1 Patch to skin as directed every 24 hours.  Dose:  1 Patch     pantoprazole 40 MG Tbec  Commonly known as:  PROTONIX   Take 40 mg by mouth 1 time daily as needed.  Dose:  40 mg     PATANOL 0.1 % ophthalmic solution  Generic drug:  olopatadine   Place 1 Drop in both eyes as needed for Allergies (for allergies, itchy eyes).  Dose:  1 Drop        STOP taking these medications    acetaminophen-codeine #3 300-30 MG Tabs  Commonly known as:  TYLENOL #3     ARICEPT 10 MG tablet  Generic drug:  donepezil     atorvastatin 10 MG Tabs  Commonly known as:  LIPITOR     citalopram 20 MG Tabs  Commonly known as:  CELEXA     coenzyme Q-10 30 MG capsule     estradiol 0.1 MG/GM vaginal cream  Commonly known as:  ESTRACE     magnesium oxide 400 MG Tabs  Commonly known as:  MAG-OX     meclizine 25 MG Tabs  Commonly known as:  ANTIVERT     modafinil 100 MG Tabs  Commonly known as:  PROVIGIL     PROLIA 60 MG/ML Soln  Generic drug:  denosumab     therapeutic multivitamin-minerals Tabs     VESICARE 5 MG tablet  Generic drug:  solifenacin     vitamin D 1000 UNIT Tabs  Commonly known as:  cholecalciferol            Allergies  Allergies   Allergen Reactions   • Donepezil    • Pcn [Penicillins]      Hives  Per patient has taken Keflex without any reaction in the past.        DIET  Orders Placed This Encounter   Procedures   • Diet NPO     Standing Status:   Standing     Number of Occurrences:   1     Order Specific Question:   Restrict to:     Answer:   Strict [1]       ACTIVITY  As tolerated.  Weight bearing as tolerated    CONSULTATIONS  Neurology   Palliative team     PROCEDURES  None     LABORATORY  Lab Results   Component Value Date    SODIUM 132 (L) 08/25/2019    POTASSIUM 4.4 08/25/2019    CHLORIDE 98 08/25/2019    CO2 29 08/25/2019    GLUCOSE 105 (H) 08/25/2019    BUN 31 (H) 08/25/2019    CREATININE 0.67 08/25/2019        Lab Results   Component Value Date    WBC 12.3 (H) 08/25/2019    HEMOGLOBIN 11.3 (L) 08/25/2019    HEMATOCRIT 36.0 (L) 08/25/2019    PLATELETCT 335 08/25/2019        Total time of the discharge process exceeds 30 minutes.

## 2019-08-28 NOTE — PROGRESS NOTES
Disoriented to time, tylenol given for headache w/ good results reported by patient. Cortrak in place, Replete Fiber at 50, 150cc FWF q4hrs, HOB >30. Incontinent x2, Purewick in place.

## 2019-11-12 ENCOUNTER — TELEPHONE (OUTPATIENT)
Dept: PHYSICAL THERAPY | Facility: MEDICAL CENTER | Age: 84
End: 2019-11-12

## 2019-11-13 NOTE — THERAPY
Called patient between 75 and 120 days after discharge. Assessed Modified [place] Scale to be mRs6.  Per family patient passed away on 9/7/19.